# Patient Record
Sex: FEMALE | Race: WHITE | NOT HISPANIC OR LATINO | Employment: FULL TIME | URBAN - METROPOLITAN AREA
[De-identification: names, ages, dates, MRNs, and addresses within clinical notes are randomized per-mention and may not be internally consistent; named-entity substitution may affect disease eponyms.]

---

## 2017-01-19 ENCOUNTER — OFFICE VISIT (OUTPATIENT)
Dept: FAMILY MEDICINE | Facility: CLINIC | Age: 47
End: 2017-01-19
Payer: COMMERCIAL

## 2017-01-19 VITALS
SYSTOLIC BLOOD PRESSURE: 117 MMHG | WEIGHT: 147.7 LBS | HEART RATE: 74 BPM | TEMPERATURE: 96.5 F | BODY MASS INDEX: 22.39 KG/M2 | HEIGHT: 68 IN | OXYGEN SATURATION: 99 % | DIASTOLIC BLOOD PRESSURE: 76 MMHG

## 2017-01-19 DIAGNOSIS — J10.1 INFLUENZA B: Primary | ICD-10-CM

## 2017-01-19 DIAGNOSIS — Z86.79 H/O PAROXYSMAL SUPRAVENTRICULAR TACHYCARDIA: ICD-10-CM

## 2017-01-19 DIAGNOSIS — R05.9 COUGH: ICD-10-CM

## 2017-01-19 DIAGNOSIS — R93.89 ABNORMAL CXR: ICD-10-CM

## 2017-01-19 LAB
DEPRECATED S PYO AG THROAT QL EIA: NORMAL
FLUAV+FLUBV AG SPEC QL: ABNORMAL
FLUAV+FLUBV AG SPEC QL: ABNORMAL
MICRO REPORT STATUS: NORMAL
SPECIMEN SOURCE: ABNORMAL
SPECIMEN SOURCE: NORMAL

## 2017-01-19 PROCEDURE — 87880 STREP A ASSAY W/OPTIC: CPT | Performed by: NURSE PRACTITIONER

## 2017-01-19 PROCEDURE — 87081 CULTURE SCREEN ONLY: CPT | Performed by: NURSE PRACTITIONER

## 2017-01-19 PROCEDURE — 99214 OFFICE O/P EST MOD 30 MIN: CPT | Performed by: NURSE PRACTITIONER

## 2017-01-19 PROCEDURE — 87804 INFLUENZA ASSAY W/OPTIC: CPT | Performed by: NURSE PRACTITIONER

## 2017-01-19 RX ORDER — ALBUTEROL SULFATE 90 UG/1
2 AEROSOL, METERED RESPIRATORY (INHALATION) EVERY 6 HOURS PRN
Qty: 1 INHALER | Refills: 0 | Status: SHIPPED | OUTPATIENT
Start: 2017-01-19 | End: 2018-05-18

## 2017-01-19 NOTE — MR AVS SNAPSHOT
After Visit Summary   1/19/2017    Wanda Orellana    MRN: [id number]           Patient Information     Date Of Birth          1970        Visit Information        Provider Department      1/19/2017 11:45 AM Carol Bahena APRN [facility]        Today's Diagnoses     Influenza B    -  1     Cough         Abnormal CXR         H/O paroxysmal supraventricular tachycardia           Care Instructions      Return in a couple months for a physical  Come fasting 10-12 hours water and pills only  Recheck thyroid at physical  CXR in a [facility] ([facility])    Influenza (Adult)    Influenza is also called the flu. It is a viral illness that affects the air passages of your lungs. It is different from the common cold. The flu can easily be passed from one to person to another. It may be spread through the air by coughing and sneezing. Or it can be spread by touching the sick person and then touching your own eyes, nose, or mouth.  The flu starts 1 to 3 days after you are exposed to the flu virus. It may last for 1 to 2 weeks. You usually don t need to take antibiotics unless you have a complication. This might be an ear or sinus infection or pneumonia.  Symptoms of the flu may be mild or severe. They can include extreme tiredness (wanting to stay in bed all day), chills, fevers, muscle aches, soreness with eye movement, headache, and a dry, hacking cough.  Home care  Follow these guidelines when caring for yourself at home:    Avoid being around cigarette smoke, whether yours or other people s.    Acetaminophen or ibuprofen will help ease your fever, muscle aches, and headache. Don t give aspirin to anyone younger than 18 who has the flu. Aspirin can harm the liver.    Nausea and loss of appetite are common with the flu. Eat light meals. Drink 6 to 8 glasses of liquids every day. Good choices are water, sport drinks, soft drinks without caffeine, juices, tea, and  soup. Extra fluids will also help loosen secretions in your nose and lungs.    Over-the-counter cold medicines will not make the flu go away faster. But the medicines may help with coughing, sore throat, and congestion in your nose and sinuses. Don t use a decongestant if you have high blood pressure.    Stay home until your fever has been gone for at least 24 hours without using medicine to reduce fever.  Follow-up care  Follow up with your health care provider, or as advised, if you are not getting better over the next week.  If you are 65 or older, talk with your provider about getting a pneumococcal vaccine every 5 years. You should also get this vaccine if you have chronic asthma or COPD. All adults should get a flu vaccine every fall. Ask your provider about this.  When to seek medical advice  Call your health care provider right away if any of these occur:    Cough with lots of colored sputum (mucus) or blood in your sputum    Chest pain, shortness of breath, wheezing, or difficulty breathing    Severe headache, or face, neck, or ear pain    New rash with fever    Fever of 101 F (38 C) oral or higher that doesn t get better with fever medicine    Confusion, behavior change, or seizure    Severe weakness or dizziness    You get a fever or cough after getting better for a few days       7409-1057 [employer]. [address] [place] [address], [place] [address]. All rights reserved. This information is not intended as a substitute for professional medical care. Always follow your healthcare professional's instructions.        Treatment for Supraventricular Tachycardia  Supraventricular tachycardia (SVT) is a type of abnormally fast heartbeat. The heart normally beats 60 to 100 beats per minute. With SVT, the heart beats more than 100 times a minute. It may beat as fast as 250 times a minute. This is caused by a problem in the electrical system of the heart. It can lessen the amount of blood pumped through  the heart.  Types of treatment   You may not need treatment for SVT if you have only had one episode. If you do need treatment, there are several kinds. They include:    Valsalva maneuver. This is a way to increase pressure in the abdomen and chest. It can correct your heart rhythm right away. To do it, you bear down with your stomach muscles, as though you are trying to have a bowel movement.    Carotid massage. Your healthcare provider may gently rub the carotid artery in your neck. This can also help correct the heart rhythm right away.    Medicine. There are various kinds you can take. Calcium channel blockers or adenosine can help correct heart rhythm right away. If you have SVT only 1 or 2 times a year, you may take beta-blockers or calcium channel medicine as needed. If your SVT is more frequent, you may need to take medicine every day. Some people may need to take several medicines to prevent episodes of SVT.    Electrocardioversion. This is a shock to the heart to restart a normal rhythm right away. This may be done if you have a severe episode of SVT.    Catheter ablation. This can help permanently stop SVT. Your healthcare provider puts a thin, flexible tube (catheter) into a blood vessel in the groin. He or she then gently pushes it up into your heart. The area of your heart that causes your SVT is then burned. This prevents that area from starting a signal that causes SVT.   Lifestyle changes to help prevent SVT episodes  Your healthcare provider might suggest other ways to help prevent SVT, such as:    Having less alcohol and caffeine    Not smoking    Lowering your stress    Eating foods that are healthy for your heart  When to call your healthcare provider  Call your healthcare provider if you have any of the following:    Sudden shortness of breath (call 911)    Severe palpitations    Severe dizziness    Severe chest pain    Symptoms that are happening more often     5923-1339 [employer]  [employer] [address] [place] [address]. All rights reserved. This information is not intended as a substitute for professional medical care. Always follow your healthcare professional's instructions.              Follow-ups after your visit        Who to contact     If you have questions or need follow up information about today's clinic visit or your schedule please contact [facility] directly at [phone].  Normal or non-critical lab and imaging results will be communicated to you by [email or web address], letter or phone within 4 business days after the clinic has received the results. If you do not hear from us within 7 days, please contact the clinic through [employer] or phone. If you have a critical or abnormal lab result, we will notify you by phone as soon as possible.  Submit refill requests through [employer] or call your pharmacy and they will forward the refill request to us. Please allow 3 business days for your refill to be completed.          Additional Information About Your Visit        [email or web address] Information     [employer] gives you secure access to your electronic health record. If you see a primary care provider, you can also send messages to your care team and make appointments. If you have questions, please call your primary care clinic.  If you do not have a primary care provider, please call [phone] and they will assist you.        Care EveryWhere ID     This is your Care EveryWhere ID. This could be used by other organizations to access your [facility] medical records  [id number]        Your Vitals Were     Pulse Temperature Height BMI (Body Mass Index) Pulse Oximetry       74 96.5  F (35.8  C) (Oral) 5' 7.91\" (1.725 m) 22.51 kg/m2 99%        Blood Pressure from Last 3 Encounters:   01/19/17 117/76   05/23/16 118/80   02/10/16 123/87    Weight from Last 3 Encounters:   01/19/17 147 lb 11.2 oz (66.996 kg)   05/23/16 145 lb 1.6 oz (65.817 kg)   02/10/16 144 lb 4.8 oz (65.454 kg)         "      We Performed the Following     Beta strep group A culture     Influenza A/B antigen     Strep, Rapid Screen     XR Chest 2 Views          Today's Medication Changes          These changes are accurate as of: 1/19/17 12:46 PM.  If you have any questions, ask your nurse or doctor.               Start taking these medicines.        Dose/Directions    albuterol 108 (90 BASE) MCG/ACT Inhaler   Commonly known as:  PROAIR HFA/PROVENTIL HFA/VENTOLIN HFA   Used for:  Influenza B   Started by:  Carol Bahena APRN CNP        Dose:  2 puff   Inhale 2 puffs into the lungs every 6 hours as needed for shortness of breath / dyspnea or wheezing   Quantity:  1 Inhaler   Refills:  0            Where to get your medicines      These medications were sent to [id number] - [place], [place] - [address]  [address] [address]     Phone:  [phone]    - albuterol 108 (90 BASE) MCG/ACT Inhaler             Primary Care Provider Office Phone # Fax #    Desire Garrett [phone]       XXX RESIGNED  [address] [facility] [address]  [facility] [address]        Thank you!     Thank you for choosing [facility]  for your care. Our goal is always to provide you with excellent care. Hearing back from our patients is one way we can continue to improve our services. Please take a few minutes to complete the written survey that you may receive in the mail after your visit with us. Thank you!             Your Updated Medication List - Protect others around you: Learn how to safely use, store and throw away your medicines at www.disposemymeds.org.          This list is accurate as of: 1/19/17 12:46 PM.  Always use your most recent med list.                   Brand Name Dispense Instructions for use    albuterol 108 (90 BASE) MCG/ACT Inhaler    PROAIR HFA/PROVENTIL HFA/VENTOLIN HFA    1 Inhaler    Inhale 2 puffs into the lungs every 6 hours as needed for shortness of breath /  dyspnea or wheezing       multivitamin, therapeutic with minerals Tabs tablet      Take 1 tablet by mouth daily       norethindrone-ethinyl estradiol 1-35 MG-MCG per tablet    ORTHO-NOVUM 1-35 TAB,NORTREL 1-35 TAB    84 tablet    Take 1 tablet by mouth daily       vitamin D 2000 UNITS tablet      Take 1 tablet by mouth daily.       vitamin E 400 UNITS Tabs      Take 400 Units by mouth daily.       ZYRTEC ALLERGY PO      Take 1 tablet by mouth daily.

## 2017-01-19 NOTE — NURSING NOTE
"Chief Complaint   Patient presents with     Sinus Problem       Initial /76 mmHg  Pulse 74  Temp(Src) 96.5  F (35.8  C) (Oral)  Ht 5' 7.91\" (1.725 m)  Wt 147 lb 11.2 oz (66.996 kg)  BMI 22.51 kg/m2  SpO2 99% Estimated body mass index is 22.51 kg/(m^2) as calculated from the following:    Height as of this encounter: 5' 7.91\" (1.725 m).    Weight as of this encounter: 147 lb 11.2 oz (66.996 kg).  BP completed using cuff size: mg Smith MA      "

## 2017-01-19 NOTE — PATIENT INSTRUCTIONS
Return in a couple months for a physical  Come fasting 10-12 hours water and pills only  Recheck thyroid at physical  CXR in a [facility] ([facility])    Influenza (Adult)    Influenza is also called the flu. It is a viral illness that affects the air passages of your lungs. It is different from the common cold. The flu can easily be passed from one to person to another. It may be spread through the air by coughing and sneezing. Or it can be spread by touching the sick person and then touching your own eyes, nose, or mouth.  The flu starts 1 to 3 days after you are exposed to the flu virus. It may last for 1 to 2 weeks. You usually don t need to take antibiotics unless you have a complication. This might be an ear or sinus infection or pneumonia.  Symptoms of the flu may be mild or severe. They can include extreme tiredness (wanting to stay in bed all day), chills, fevers, muscle aches, soreness with eye movement, headache, and a dry, hacking cough.  Home care  Follow these guidelines when caring for yourself at home:    Avoid being around cigarette smoke, whether yours or other people s.    Acetaminophen or ibuprofen will help ease your fever, muscle aches, and headache. Don t give aspirin to anyone younger than 18 who has the flu. Aspirin can harm the liver.    Nausea and loss of appetite are common with the flu. Eat light meals. Drink 6 to 8 glasses of liquids every day. Good choices are water, sport drinks, soft drinks without caffeine, juices, tea, and soup. Extra fluids will also help loosen secretions in your nose and lungs.    Over-the-counter cold medicines will not make the flu go away faster. But the medicines may help with coughing, sore throat, and congestion in your nose and sinuses. Don t use a decongestant if you have high blood pressure.    Stay home until your fever has been gone for at least 24 hours without using medicine to reduce fever.  Follow-up care  Follow up with  your health care provider, or as advised, if you are not getting better over the next week.  If you are 65 or older, talk with your provider about getting a pneumococcal vaccine every 5 years. You should also get this vaccine if you have chronic asthma or COPD. All adults should get a flu vaccine every fall. Ask your provider about this.  When to seek medical advice  Call your health care provider right away if any of these occur:    Cough with lots of colored sputum (mucus) or blood in your sputum    Chest pain, shortness of breath, wheezing, or difficulty breathing    Severe headache, or face, neck, or ear pain    New rash with fever    Fever of 101 F (38 C) oral or higher that doesn t get better with fever medicine    Confusion, behavior change, or seizure    Severe weakness or dizziness    You get a fever or cough after getting better for a few days       3198-2482 [employer]. [address] [place] [address]. All rights reserved. This information is not intended as a substitute for professional medical care. Always follow your healthcare professional's instructions.        Treatment for Supraventricular Tachycardia  Supraventricular tachycardia (SVT) is a type of abnormally fast heartbeat. The heart normally beats 60 to 100 beats per minute. With SVT, the heart beats more than 100 times a minute. It may beat as fast as 250 times a minute. This is caused by a problem in the electrical system of the heart. It can lessen the amount of blood pumped through the heart.  Types of treatment   You may not need treatment for SVT if you have only had one episode. If you do need treatment, there are several kinds. They include:    Valsalva maneuver. This is a way to increase pressure in the abdomen and chest. It can correct your heart rhythm right away. To do it, you bear down with your stomach muscles, as though you are trying to have a bowel movement.    Carotid massage. Your healthcare provider  may gently rub the carotid artery in your neck. This can also help correct the heart rhythm right away.    Medicine. There are various kinds you can take. Calcium channel blockers or adenosine can help correct heart rhythm right away. If you have SVT only 1 or 2 times a year, you may take beta-blockers or calcium channel medicine as needed. If your SVT is more frequent, you may need to take medicine every day. Some people may need to take several medicines to prevent episodes of SVT.    Electrocardioversion. This is a shock to the heart to restart a normal rhythm right away. This may be done if you have a severe episode of SVT.    Catheter ablation. This can help permanently stop SVT. Your healthcare provider puts a thin, flexible tube (catheter) into a blood vessel in the groin. He or she then gently pushes it up into your heart. The area of your heart that causes your SVT is then burned. This prevents that area from starting a signal that causes SVT.   Lifestyle changes to help prevent SVT episodes  Your healthcare provider might suggest other ways to help prevent SVT, such as:    Having less alcohol and caffeine    Not smoking    Lowering your stress    Eating foods that are healthy for your heart  When to call your healthcare provider  Call your healthcare provider if you have any of the following:    Sudden shortness of breath (call 911)    Severe palpitations    Severe dizziness    Severe chest pain    Symptoms that are happening more often     5880-1432 [employer]. [address] [place] [address]. All rights reserved. This information is not intended as a substitute for professional medical care. Always follow your healthcare professional's instructions.

## 2017-01-19 NOTE — PROGRESS NOTES
"  SUBJECTIVE:                                                    Wanda Orellana is a 47 year old female who presents to clinic today for the following health issues:    Acute Illness  Please do ACT and AAP   Acute illness concerns: Sinus infection and cough  Onset: 1 week     Fever: YES     Chills/Sweats: YES    Headache (location?): YES    Sinus Pressure:YES    Conjunctivitis:  no    Ear Pain: YES: both    Rhinorrhea: YES    Congestion: YES    Sore Throat: no      Cough: YES    Wheeze: YES    Decreased Appetite: YES    Nausea: YES    Vomiting: no     Diarrhea:  no     Dysuria/Freq.: no     Fatigue/Achiness: YES    Sick/Strep Exposure: no      Therapies Tried and outcome: Tylenol.   Notes that she has history of PSVT and has been told not to take cold medicines - was diagnosed at the ED in 2014.  Reviewed record and patient did not have follow-up with cardiology.  Did follow-up on elevated TSH noted in ED and was normal on recheck.  Also had \"focal density\" on CXR at ED and has not had recommended repeat CXR.    Unsure about history with asthma or diagnosis.  Has had trouble breathing with exercise in the past, but not currently.  When she gets a cold it lasts much longer than for others.  Records show mild intermittent diagnosis.  She has an old albuterol that she never uses.    Questions/Concerns: 3 year old has the running nose and cold. What should she do to lessen it.    Problem list and histories reviewed & adjusted, as indicated.  Additional history: as documented    Problem list, Medication list, Allergies, and Medical/Social/Surgical histories reviewed in [facility] and updated as appropriate.    ROS:  Constitutional, HEENT, cardiovascular, pulmonary, gi and gu systems are negative, except as otherwise noted.    OBJECTIVE:                                                    /76 mmHg  Pulse 74  Temp(Src) 96.5  F (35.8  C) (Oral)  Ht 5' 7.91\" (1.725 m)  Wt 147 lb 11.2 oz (66.996 kg)  BMI 22.51 kg/m2  " SpO2 99%  Body mass index is 22.51 kg/(m^2).  GENERAL: alert and fatigued  EYES: PERRL, EOMI and conjunctiva/corneas- conjunctival injection OU  HENT: normal cephalic/atraumatic, both ears: clear effusion, nose and mouth without ulcers or lesions, nasal mucosa edematous , rhinorrhea clear, oropharynx clear and oral mucous membranes moist  NECK: bilateral anterior cervical adenopathy, no asymmetry, masses, or scars and thyroid normal to palpation  RESP: expiratory wheezes throughout  CV: regular rate and rhythm, normal S1 S2, no S3 or S4, no murmur, click or rub, no peripheral edema and peripheral pulses strong    Diagnostic Test Results:  Results for orders placed or performed in visit on 01/19/17 (from the past 24 hour(s))   Influenza A/B antigen   Result Value Ref Range    Influenza A/B Agn Specimen Nasal     Influenza A  NEG     Negative   Test results must be correlated with clinical data. If necessary, results   should be confirmed by a molecular assay or viral culture.      Influenza B (A) NEG     Positive   Test results must be correlated with clinical data. If necessary, results   should be confirmed by a molecular assay or viral culture.     Strep, Rapid Screen   Result Value Ref Range    Specimen Description Throat     Rapid Strep A Screen       NEGATIVE: No Group A streptococcal antigen detected by immunoassay, await   culture report.      Micro Report Status FINAL 01/19/2017         ASSESSMENT/PLAN:                                                    Asthma: Intermittent with mild exacerbation   Plan:  Asthma Action Plan given  Medications:  Short acting bronchodilator inhaler: Albuterol          (J10.1) Influenza B  (primary encounter diagnosis)  Comment:   Plan: albuterol (PROAIR HFA/PROVENTIL HFA/VENTOLIN         HFA) 108 (90 BASE) MCG/ACT Inhaler    Concern for potential for respiratory distress due to asthma diagnosis.  Wheezing is mild and VS are stable.  Albuterol for wheezing.  Discussed tamiflu  and opted against due to length since onset of symptoms and relative respiratory stability.  Fluids, humidified air and ibuprofen for self-care.  Follow-up in 1-2 month during physical unless worsening symptoms.  Call if SVT provoked by albuterol.      (R05) Cough  Comment:   Plan: Beta strep group A culture, Strep, Rapid         Screen, Influenza A/B antigen            (R93.8) Abnormal CXR  Comment:   Plan: XR Chest 2 Views        Follow-up CXR to 2014 - defer until one month after flu    (Z86.79) H/O paroxysmal supraventricular tachycardia  Comment:   Plan: noted in PL - discussed that she can see cardiology if desired.  It sounds as if episodes are rare and largely self-limiting.  Follow-up further at physical.  Recheck thyroid.    Patient Instructions       Return in a couple months for a physical  Come fasting 10-12 hours water and pills only  Recheck thyroid at physical  CXR in a [facility] ([facility])    Influenza (Adult)    Influenza is also called the flu. It is a viral illness that affects the air passages of your lungs. It is different from the common cold. The flu can easily be passed from one to person to another. It may be spread through the air by coughing and sneezing. Or it can be spread by touching the sick person and then touching your own eyes, nose, or mouth.  The flu starts 1 to 3 days after you are exposed to the flu virus. It may last for 1 to 2 weeks. You usually don t need to take antibiotics unless you have a complication. This might be an ear or sinus infection or pneumonia.  Symptoms of the flu may be mild or severe. They can include extreme tiredness (wanting to stay in bed all day), chills, fevers, muscle aches, soreness with eye movement, headache, and a dry, hacking cough.  Home care  Follow these guidelines when caring for yourself at home:    Avoid being around cigarette smoke, whether yours or other people s.    Acetaminophen or ibuprofen will help ease  your fever, muscle aches, and headache. Don t give aspirin to anyone younger than 18 who has the flu. Aspirin can harm the liver.    Nausea and loss of appetite are common with the flu. Eat light meals. Drink 6 to 8 glasses of liquids every day. Good choices are water, sport drinks, soft drinks without caffeine, juices, tea, and soup. Extra fluids will also help loosen secretions in your nose and lungs.    Over-the-counter cold medicines will not make the flu go away faster. But the medicines may help with coughing, sore throat, and congestion in your nose and sinuses. Don t use a decongestant if you have high blood pressure.    Stay home until your fever has been gone for at least 24 hours without using medicine to reduce fever.  Follow-up care  Follow up with your health care provider, or as advised, if you are not getting better over the next week.  If you are 65 or older, talk with your provider about getting a pneumococcal vaccine every 5 years. You should also get this vaccine if you have chronic asthma or COPD. All adults should get a flu vaccine every fall. Ask your provider about this.  When to seek medical advice  Call your health care provider right away if any of these occur:    Cough with lots of colored sputum (mucus) or blood in your sputum    Chest pain, shortness of breath, wheezing, or difficulty breathing    Severe headache, or face, neck, or ear pain    New rash with fever    Fever of 101 F (38 C) oral or higher that doesn t get better with fever medicine    Confusion, behavior change, or seizure    Severe weakness or dizziness    You get a fever or cough after getting better for a few days       5903-0466 [employer]. [address] [place] [address], [place] [address]. All rights reserved. This information is not intended as a substitute for professional medical care. Always follow your healthcare professional's instructions.        Treatment for Supraventricular  Tachycardia  Supraventricular tachycardia (SVT) is a type of abnormally fast heartbeat. The heart normally beats 60 to 100 beats per minute. With SVT, the heart beats more than 100 times a minute. It may beat as fast as 250 times a minute. This is caused by a problem in the electrical system of the heart. It can lessen the amount of blood pumped through the heart.  Types of treatment   You may not need treatment for SVT if you have only had one episode. If you do need treatment, there are several kinds. They include:    Valsalva maneuver. This is a way to increase pressure in the abdomen and chest. It can correct your heart rhythm right away. To do it, you bear down with your stomach muscles, as though you are trying to have a bowel movement.    Carotid massage. Your healthcare provider may gently rub the carotid artery in your neck. This can also help correct the heart rhythm right away.    Medicine. There are various kinds you can take. Calcium channel blockers or adenosine can help correct heart rhythm right away. If you have SVT only 1 or 2 times a year, you may take beta-blockers or calcium channel medicine as needed. If your SVT is more frequent, you may need to take medicine every day. Some people may need to take several medicines to prevent episodes of SVT.    Electrocardioversion. This is a shock to the heart to restart a normal rhythm right away. This may be done if you have a severe episode of SVT.    Catheter ablation. This can help permanently stop SVT. Your healthcare provider puts a thin, flexible tube (catheter) into a blood vessel in the groin. He or she then gently pushes it up into your heart. The area of your heart that causes your SVT is then burned. This prevents that area from starting a signal that causes SVT.   Lifestyle changes to help prevent SVT episodes  Your healthcare provider might suggest other ways to help prevent SVT, such as:    Having less alcohol and caffeine    Not  smoking    Lowering your stress    Eating foods that are healthy for your heart  When to call your healthcare provider  Call your healthcare provider if you have any of the following:    Sudden shortness of breath (call 911)    Severe palpitations    Severe dizziness    Severe chest pain    Symptoms that are happening more often     8423-6003 [employer]. [address] [place] [address]. All rights reserved. This information is not intended as a substitute for professional medical care. Always follow your healthcare professional's instructions.              MARY JO Willams [facility]

## 2017-01-20 ASSESSMENT — ASTHMA QUESTIONNAIRES: ACT_TOTALSCORE: 20

## 2017-01-21 LAB
BACTERIA SPEC CULT: NORMAL
MICRO REPORT STATUS: NORMAL
SPECIMEN SOURCE: NORMAL

## 2017-03-13 NOTE — TELEPHONE ENCOUNTER
Pending Prescriptions:                       Disp   Refills    norethindrone-ethinyl estradiol (ORTHO-NO*84 tab*0            Sig: Take 1 tablet by mouth daily      Last OV was 3/2/16. Due for AE. TC to patient. LMTC.   Adeola Corral, RN-BSN

## 2017-03-22 NOTE — PROGRESS NOTES
SUBJECTIVE:     CC: Wanda Orellana is an 47 year old woman who presents for preventive health visit.     Healthy Habits:    Do you get at least three servings of calcium containing foods daily (dairy, green leafy vegetables, etc.)? yes    Amount of exercise or daily activities, outside of work: 3 day(s) per week    Problems taking medications regularly No    Medication side effects: No    Have you had an eye exam in the past two years? yes    Do you see a dentist twice per year? yes    Do you have sleep apnea, excessive snoring or daytime drowsiness?yes    Questions/Concerns: Possible yeast infection. Had the flu over the past weekend and feeling very tired. Something going on with both eyes with different issues.     Influenza in January and flu-like illness over this past weekend    Vaginal Symptoms      Duration: 1 month    Description  vaginal discharge - white clear, itching and odor    Intensity:  mild    Accompanying signs and symptoms (fever/dysuria/abdominal or back pain): Fever this weekend but might not be related to it.    History  Sexually active: yes, single partner, contraception - none  Possibility of pregnancy: No  Recent antibiotic use: no     Precipitating or alleviating factors: None    Therapies tried and outcome: none   Outcome: None     Right Eye Problem      Duration: 2-3 days    Description:  Location: right  Pain: YES- sore and stingy  Redness: YES  Discharge: YES- liquid and clear    Accompanying signs and symptoms: Really tired and is strain    History (Trauma, foreign body exposure,): None but has been very watery and red; had laser eye surgery    Precipitating or alleviating factors (contact use): None    Therapies tried and outcome: Went to the eye doctor in January - new prescription, but these do not fix the problem and can even be worse vision.  Did have pressure check.     Left Eye Problem stigmatism       Duration: last couple months vision changing and both eyes very watery.   Wateriness has improved.    Description:  Location: left  Pain: YES - sore and stingy  Redness: YES  Discharge: YES- liquid and clear    Accompanying signs and symptoms: really tired    History (Trauma, foreign body exposure,): none, but has stigmatism in left eye    Precipitating or alleviating factors (contact use): None    Therapies tried and outcome: Went to the eye doctor.       Today's PHQ-2 Score:   PHQ-2 ( 1999 [employer]) 3/24/2017 1/19/2017   Q1: Little interest or pleasure in doing things 0 0   Q2: Feeling down, depressed or hopeless 0 0   PHQ-2 Score 0 0       Abuse: Current or Past(Physical, Sexual or Emotional)- No  Do you feel safe in your environment - Yes    Social History   Substance Use Topics     Smoking status: Never Smoker     Smokeless tobacco: Never Used     Alcohol use Yes      Comment: OCCASSIONALLY- 2 PER MONTH     The patient does not drink >3 drinks per day nor >7 drinks per week.    Recent Labs   Lab Test  02/10/16   0934   CHOL  211*   HDL  54   LDL  124*   TRIG  164*   NHDL  157*       Reviewed orders with patient.  Reviewed health maintenance and updated orders accordingly - Yes    Mammo Decision Support:  Patient under age 50, mutual decision reflected in health maintenance.      Pertinent mammograms are reviewed under the imaging tab.  History of abnormal Pap smear: NO - age 30- 65 PAP every 3 years recommended    Reviewed and updated as needed this visit by clinical staff  Tobacco  Allergies  Meds  Med Hx  Surg Hx  Fam Hx  Soc Hx        Reviewed and updated as needed this visit by Provider        Past Medical History:   Diagnosis Date     Fracture of metatarsal bone(s), closed 11/09    foreign body in foot ?needle?     Headaches      Mild intermittent asthma 2011    exercise induced     Seasonal allergies       Past Surgical History:   Procedure Laterality Date     DILATION AND CURETTAGE SUCTION  2/24/2012    Procedure:DILATION AND CURETTAGE SUCTION; Suction Dilation & Curettage    (11 weeks); Surgeon:CHESTER ALVAREZ; Location:[facility] OR     EYE SURGERY  2002    laser surg w/ complications of infections, etc     WISDOM TEETH[         ROS:  C: NEGATIVE for fever, chills, change in weight; fatigue  I: NEGATIVE for worrisome rashes, moles or lesions  E: NEGATIVE for vision changes or irritation  ENT: NEGATIVE for ear, mouth and throat problems other than noted above  R: NEGATIVE for significant cough or SOB  B: NEGATIVE for masses, tenderness or discharge  CV: NEGATIVE for chest pain, palpitations or peripheral edema  GI: NEGATIVE for nausea, abdominal pain, heartburn, or change in bowel habits  : NEGATIVE for unusual urinary or vaginal symptoms other than noted above. Periods are regular.  M: NEGATIVE for significant arthralgias or myalgia  N: NEGATIVE for weakness, dizziness or paresthesias  E: NEGATIVE for temperature intolerance, skin/hair changes  H: NEGATIVE for bleeding problems  P: NEGATIVE for changes in mood or affect    Problem list, Medication list, Allergies, and Medical/Social/Surgical histories reviewed in EPIC and updated as appropriate.  OBJECTIVE:     /73 (BP Location: Left arm, Patient Position: Chair, Cuff Size: Adult Regular)  Pulse 74  Temp 98.1  F (36.7  C) (Oral)  Wt 147 lb 4.8 oz (66.8 kg)  LMP 03/17/2017 (Approximate)  SpO2 99%  BMI 22.45 kg/m2  EXAM:  GENERAL: healthy, alert and no distress  EYES: Eyes grossly normal to inspection, PERRL and conjunctivae injected  HENT: ear canals and TM's normal, nose and mouth without ulcers or lesions  NECK: no adenopathy, no asymmetry, masses, or scars and thyroid normal to palpation  RESP: lungs clear to auscultation - no rales, rhonchi or wheezes  BREAST: normal without masses, tenderness or nipple discharge and no palpable axillary masses or adenopathy  CV: regular rate and rhythm, normal S1 S2, no S3 or S4, no murmur, click or rub, no peripheral edema and peripheral pulses strong  ABDOMEN: soft, nontender, no  "hepatosplenomegaly, no masses and bowel sounds normal  MS: no gross musculoskeletal defects noted, no edema  SKIN: no suspicious lesions or rashes  NEURO: Normal strength and tone, mentation intact and speech normal  PSYCH: mentation appears normal, affect normal/bright  LYMPH: no cervical, supraclavicular, axillary, or adenopathy    ASSESSMENT/PLAN:     (Z00.01) Encounter for routine adult medical exam with abnormal findings  (primary encounter diagnosis)  Comment:   Plan:     (J45.20) Mild intermittent asthma without complication  Comment:   Plan: Asthma Action Plan (AAP)            (N89.8) Vaginal discharge  Comment:   Plan: Wet prep            (H57.8) Eye discharge  Comment:   Plan: olopatadine (PATANOL) 0.1 % ophthalmic solution            (Z13.220) Lipid screening  Comment:   Plan: Lipid Profile (Chol, Trig, HDL, LDL calc)            (Z13.1) Screening for diabetes mellitus  Comment:   Plan: Glucose            (R53.83) Fatigue, unspecified type  Comment:   Plan: CBC with platelets differential, TSH with free         T4 reflex     Likely simply parenting young child.  Has no other symptoms of concern.  Will rule out anemia and thyroid    (Z30.41) Encounter for surveillance of contraceptive pills  Comment:   Plan: norethindrone-ethinyl estradiol (ORTHO-NOVUM         1-35 TAB,NORTREL 1-35 TAB) 1-35 MG-MCG per         tablet            (B37.3) Yeast infection of the vagina  Comment:   Plan: fluconazole (DIFLUCAN) 150 MG tablet        Discussed dual infection and treatment options as well as vaginal health and hygeine    (N76.0,  B96.89) BV (bacterial vaginosis)  Comment:   Plan: metroNIDAZOLE (METROGEL) 0.75 % vaginal gel              COUNSELING:   Reviewed preventive health counseling, as reflected in patient instructions       reports that she has never smoked. She has never used smokeless tobacco.    Estimated body mass index is 22.45 kg/(m^2) as calculated from the following:    Height as of 1/19/17: 5' 7.91\" " (1.725 m).    Weight as of this encounter: 147 lb 4.8 oz (66.8 kg).     Wt Readings from Last 5 Encounters:   03/24/17 147 lb 4.8 oz (66.8 kg)   01/19/17 147 lb 11.2 oz (67 kg)   05/23/16 145 lb 1.6 oz (65.8 kg)   02/10/16 144 lb 4.8 oz (65.5 kg)   11/14/14 138 lb 4.8 oz (62.7 kg)       Counseling Resources:  ATP IV Guidelines  Pooled Cohorts Equation Calculator  Breast Cancer Risk Calculator  FRAX Risk Assessment  ICSI Preventive Guidelines  Dietary Guidelines for Americans, 2010  USDA's MyPlate  ASA Prophylaxis  Lung CA Screening    MARY JO Willams CNP  [facility] with kwame

## 2017-03-24 ENCOUNTER — HOSPITAL ENCOUNTER (OUTPATIENT)
Dept: GENERAL RADIOLOGY | Facility: CLINIC | Age: 47
Discharge: HOME | End: 2017-03-24
Attending: NURSE PRACTITIONER | Admitting: NURSE PRACTITIONER
Payer: COMMERCIAL

## 2017-03-24 ENCOUNTER — OFFICE VISIT (OUTPATIENT)
Dept: FAMILY MEDICINE | Facility: CLINIC | Age: 47
End: 2017-03-24
Payer: COMMERCIAL

## 2017-03-24 ENCOUNTER — MYC MEDICAL ADVICE (OUTPATIENT)
Dept: FAMILY MEDICINE | Facility: CLINIC | Age: 47
End: 2017-03-24

## 2017-03-24 VITALS
BODY MASS INDEX: 22.45 KG/M2 | OXYGEN SATURATION: 99 % | WEIGHT: 147.3 LBS | TEMPERATURE: 98.1 F | SYSTOLIC BLOOD PRESSURE: 112 MMHG | DIASTOLIC BLOOD PRESSURE: 73 MMHG | HEART RATE: 74 BPM

## 2017-03-24 DIAGNOSIS — H57.89 EYE DISCHARGE: ICD-10-CM

## 2017-03-24 DIAGNOSIS — B37.31 YEAST INFECTION OF THE VAGINA: ICD-10-CM

## 2017-03-24 DIAGNOSIS — B96.89 BV (BACTERIAL VAGINOSIS): ICD-10-CM

## 2017-03-24 DIAGNOSIS — R53.83 FATIGUE, UNSPECIFIED TYPE: ICD-10-CM

## 2017-03-24 DIAGNOSIS — N76.0 BV (BACTERIAL VAGINOSIS): ICD-10-CM

## 2017-03-24 DIAGNOSIS — Z00.01 ENCOUNTER FOR ROUTINE ADULT MEDICAL EXAM WITH ABNORMAL FINDINGS: Primary | ICD-10-CM

## 2017-03-24 DIAGNOSIS — Z13.220 LIPID SCREENING: ICD-10-CM

## 2017-03-24 DIAGNOSIS — Z30.41 ENCOUNTER FOR SURVEILLANCE OF CONTRACEPTIVE PILLS: ICD-10-CM

## 2017-03-24 DIAGNOSIS — J45.20 MILD INTERMITTENT ASTHMA WITHOUT COMPLICATION: ICD-10-CM

## 2017-03-24 DIAGNOSIS — N89.8 VAGINAL DISCHARGE: ICD-10-CM

## 2017-03-24 DIAGNOSIS — Z13.1 SCREENING FOR DIABETES MELLITUS: ICD-10-CM

## 2017-03-24 LAB
BASOPHILS # BLD AUTO: 0 10E9/L (ref 0–0.2)
BASOPHILS NFR BLD AUTO: 0.3 %
CHOLEST SERPL-MCNC: 140 MG/DL
DIFFERENTIAL METHOD BLD: NORMAL
EOSINOPHIL # BLD AUTO: 0.1 10E9/L (ref 0–0.7)
EOSINOPHIL NFR BLD AUTO: 1.5 %
ERYTHROCYTE [DISTWIDTH] IN BLOOD BY AUTOMATED COUNT: 13.2 % (ref 10–15)
GLUCOSE SERPL-MCNC: 79 MG/DL (ref 70–99)
HCT VFR BLD AUTO: 38.5 % (ref 35–47)
HDLC SERPL-MCNC: 50 MG/DL
HGB BLD-MCNC: 12.6 G/DL (ref 11.7–15.7)
LDLC SERPL CALC-MCNC: 68 MG/DL
LYMPHOCYTES # BLD AUTO: 2.9 10E9/L (ref 0.8–5.3)
LYMPHOCYTES NFR BLD AUTO: 40.4 %
MCH RBC QN AUTO: 29.6 PG (ref 26.5–33)
MCHC RBC AUTO-ENTMCNC: 32.7 G/DL (ref 31.5–36.5)
MCV RBC AUTO: 90 FL (ref 78–100)
MICRO REPORT STATUS: ABNORMAL
MONOCYTES # BLD AUTO: 0.5 10E9/L (ref 0–1.3)
MONOCYTES NFR BLD AUTO: 7.3 %
NEUTROPHILS # BLD AUTO: 3.6 10E9/L (ref 1.6–8.3)
NEUTROPHILS NFR BLD AUTO: 50.5 %
NONHDLC SERPL-MCNC: 90 MG/DL
PLATELET # BLD AUTO: 310 10E9/L (ref 150–450)
RBC # BLD AUTO: 4.26 10E12/L (ref 3.8–5.2)
SPECIMEN SOURCE: ABNORMAL
TRIGL SERPL-MCNC: 112 MG/DL
TSH SERPL DL<=0.005 MIU/L-ACNC: 1.46 MU/L (ref 0.4–4)
WBC # BLD AUTO: 7.1 10E9/L (ref 4–11)
WET PREP SPEC: ABNORMAL

## 2017-03-24 PROCEDURE — 36415 COLL VENOUS BLD VENIPUNCTURE: CPT | Performed by: NURSE PRACTITIONER

## 2017-03-24 PROCEDURE — 99213 OFFICE O/P EST LOW 20 MIN: CPT | Mod: 25 | Performed by: NURSE PRACTITIONER

## 2017-03-24 PROCEDURE — 71020 XR CHEST 2 VW: CPT

## 2017-03-24 PROCEDURE — 84443 ASSAY THYROID STIM HORMONE: CPT | Performed by: NURSE PRACTITIONER

## 2017-03-24 PROCEDURE — 85025 COMPLETE CBC W/AUTO DIFF WBC: CPT | Performed by: NURSE PRACTITIONER

## 2017-03-24 PROCEDURE — 99396 PREV VISIT EST AGE 40-64: CPT | Performed by: NURSE PRACTITIONER

## 2017-03-24 PROCEDURE — 87210 SMEAR WET MOUNT SALINE/INK: CPT | Performed by: NURSE PRACTITIONER

## 2017-03-24 PROCEDURE — 82947 ASSAY GLUCOSE BLOOD QUANT: CPT | Performed by: NURSE PRACTITIONER

## 2017-03-24 PROCEDURE — 80061 LIPID PANEL: CPT | Performed by: NURSE PRACTITIONER

## 2017-03-24 RX ORDER — OLOPATADINE HYDROCHLORIDE 1 MG/ML
1 SOLUTION/ DROPS OPHTHALMIC 2 TIMES DAILY
Qty: 5 ML | Refills: 3 | Status: SHIPPED | OUTPATIENT
Start: 2017-03-24 | End: 2018-05-18

## 2017-03-24 RX ORDER — FLUCONAZOLE 150 MG/1
150 TABLET ORAL ONCE
Qty: 1 TABLET | Refills: 0 | Status: SHIPPED | OUTPATIENT
Start: 2017-03-24 | End: 2017-03-24

## 2017-03-24 RX ORDER — METRONIDAZOLE 7.5 MG/G
1 GEL VAGINAL AT BEDTIME
Qty: 70 G | Refills: 0 | Status: SHIPPED | OUTPATIENT
Start: 2017-03-24 | End: 2017-03-29

## 2017-03-24 NOTE — MR AVS SNAPSHOT
After Visit Summary   3/24/2017    Wanda Orellana    MRN: [id number]           Patient Information     Date Of Birth          1970        Visit Information        Provider Department      3/24/2017 9:00 AM Carol Bahena APRN [facility]        Today's Diagnoses     Routine general medical examination at a health care facility    -  1    Encounter for routine adult medical exam with abnormal findings        Mild intermittent asthma without complication        Vaginal discharge        Eye discharge        Lipid screening        Screening for diabetes mellitus        Fatigue, unspecified type        Encounter for surveillance of contraceptive pills          Care Instructions    [facility] - Early Childhood Screening - [email or web address]    Jorgito Zayas  [facility] [employer]  [employer]    Try allergy eye drops - patanol  Return to eye doctor, but make sure to see ophthalmology     Preventive Health Recommendations  Female Ages 40 to 49    Yearly exam:     See your health care provider every year in order to  1. Review health changes.   2. Discuss preventive care.    3. Review your medicines if your doctor prescribed any.      Get a Pap test every three years (unless you have an abnormal result and your provider advises testing more often).      If you get Pap tests with HPV test, you only need to test every 5 years, unless you have an abnormal result. You do not need a Pap test if your uterus was removed (hysterectomy) and you have not had cancer.      You should be tested each year for STDs (sexually transmitted diseases), if you're at risk.       Ask your doctor if you should have a mammogram.      Have a colonoscopy (test for colon cancer) if someone in your family has had colon cancer or polyps before age 50.       Have a cholesterol test every 5 years.       Have a diabetes test (fasting glucose) after age  45. If you are at risk for diabetes, you should have this test every 3 years.    Shots: Get a flu shot each year. Get a tetanus shot every 10 years.     Nutrition:     Eat at least 5 servings of fruits and vegetables each day.    Eat whole-grain bread, whole-wheat pasta and brown rice instead of white grains and rice.    Talk to your provider about Calcium and Vitamin D.     Lifestyle    Exercise at least 150 minutes a week (an average of 30 minutes a day, 5 days a week). This will help you control your weight and prevent disease.    Limit alcohol to one drink per day.    No smoking.     Wear sunscreen to prevent skin cancer.    See your dentist every six months for an exam and cleaning.        Follow-ups after your visit        Who to contact     If you have questions or need follow up information about today's clinic visit or your schedule please contact [facility] [place] at [phone].  Normal or non-critical lab and imaging results will be communicated to you by [employer], letter or phone within 4 business days after the clinic has received the results. If you do not hear from us within 7 days, please contact the clinic through [employer] or phone. If you have a critical or abnormal lab result, we will notify you by phone as soon as possible.  Submit refill requests through [employer] or call your pharmacy and they will forward the refill request to us. Please allow 3 business days for your refill to be completed.          Additional Information About Your Visit        [facility] Information     [employer] gives you secure access to your electronic health record. If you see a primary care provider, you can also send messages to your care team and make appointments. If you have questions, please call your primary care clinic.  If you do not have a primary care provider, please call [phone] and they will assist you.        Care EveryWhere ID     This is your Care EveryWhere ID. This could be used by  other organizations to access your [place] medical records  [id number]        Your Vitals Were     Pulse Temperature Last Period Pulse Oximetry BMI (Body Mass Index)       74 98.1  F (36.7  C) (Oral) 03/17/2017 (Approximate) 99% 22.45 kg/m2        Blood Pressure from Last 3 Encounters:   03/24/17 112/73   01/19/17 117/76   05/23/16 118/80    Weight from Last 3 Encounters:   03/24/17 147 lb 4.8 oz (66.8 kg)   01/19/17 147 lb 11.2 oz (67 kg)   05/23/16 145 lb 1.6 oz (65.8 kg)              We Performed the Following     Asthma Action Plan (AAP)     CBC with platelets differential     Glucose     JUST IN CASE     Lipid Profile (Chol, Trig, HDL, LDL calc)     TSH with free T4 reflex     Wet prep          Today's Medication Changes          These changes are accurate as of: 3/24/17  9:54 AM.  If you have any questions, ask your nurse or doctor.               Start taking these medicines.        Dose/Directions    olopatadine 0.1 % ophthalmic solution   Commonly known as:  PATANOL   Used for:  Eye discharge   Started by:  Carol Bahena APRN CNP        Dose:  1 drop   Place 1 drop into both eyes 2 times daily   Quantity:  5 mL   Refills:  3            Where to get your medicines      These medications were sent to [facility]/pharmacy #[id number] [address] [address]     Phone:  [phone]     norethindrone-ethinyl estradiol 1-35 MG-MCG per tablet    olopatadine 0.1 % ophthalmic solution                Primary Care Provider Office Phone # Fax #    Desire Garrett [phone]       XXX RESIGNED  [address]  [facility] [address]        Thank you!     Thank you for choosing [facility]  for your care. Our goal is always to provide you with excellent care. Hearing back from our patients is one way we can continue to improve our services. Please take a few minutes to complete the written survey that you may receive in the  mail after your visit with us. Thank you!             Your Updated Medication List - Protect others around you: Learn how to safely use, store and throw away your medicines at www.disposemymeds.org.          This list is accurate as of: 3/24/17  9:54 AM.  Always use your most recent med list.                   Brand Name Dispense Instructions for use    albuterol 108 (90 BASE) MCG/ACT Inhaler    PROAIR HFA/PROVENTIL HFA/VENTOLIN HFA    1 Inhaler    Inhale 2 puffs into the lungs every 6 hours as needed for shortness of breath / dyspnea or wheezing       multivitamin, therapeutic with minerals Tabs tablet      Take 1 tablet by mouth daily       norethindrone-ethinyl estradiol 1-35 MG-MCG per tablet    ORTHO-NOVUM 1-35 TAB,NORTREL 1-35 TAB    84 tablet    Take 1 tablet by mouth daily       olopatadine 0.1 % ophthalmic solution    PATANOL    5 mL    Place 1 drop into both eyes 2 times daily       vitamin D 2000 UNITS tablet      Take 1 tablet by mouth daily.       vitamin E 400 UNITS Tabs      Take 400 Units by mouth daily.       ZYRTEC ALLERGY PO      Take 1 tablet by mouth daily.

## 2017-03-24 NOTE — NURSING NOTE
"Chief Complaint   Patient presents with     Physical       Initial /73 (BP Location: Left arm, Patient Position: Chair, Cuff Size: Adult Regular)  Pulse 74  Temp 98.1  F (36.7  C) (Oral)  Wt 147 lb 4.8 oz (66.8 kg)  LMP 03/17/2017 (Approximate)  SpO2 99%  BMI 22.45 kg/m2 Estimated body mass index is 22.45 kg/(m^2) as calculated from the following:    Height as of 1/19/17: 5' 7.91\" (1.725 m).    Weight as of this encounter: 147 lb 4.8 oz (66.8 kg).  Medication Reconciliation: complete     Suman Smith MA      "

## 2017-04-17 NOTE — TELEPHONE ENCOUNTER
Lizet-    A biometric screening form has been placed on your desk. Please review and sign, if okay.     (Patient requests completed form be sent back to her directly).       Rain Kauffman RN  [facility]

## 2017-04-20 NOTE — TELEPHONE ENCOUNTER
I do not see the form in my files at the desk.  Could they have been completed already or by the MA?  KATHERIN Santos

## 2017-10-26 ENCOUNTER — ALLIED HEALTH/NURSE VISIT (OUTPATIENT)
Dept: NURSING | Facility: CLINIC | Age: 47
End: 2017-10-26
Payer: COMMERCIAL

## 2017-10-26 DIAGNOSIS — Z23 NEED FOR PROPHYLACTIC VACCINATION AND INOCULATION AGAINST INFLUENZA: Primary | ICD-10-CM

## 2017-10-26 PROCEDURE — 90471 IMMUNIZATION ADMIN: CPT

## 2017-10-26 PROCEDURE — 90686 IIV4 VACC NO PRSV 0.5 ML IM: CPT

## 2017-10-26 PROCEDURE — 99207 ZZC NO CHARGE NURSE ONLY: CPT

## 2017-10-26 NOTE — PROGRESS NOTES

## 2018-05-01 DIAGNOSIS — Z30.41 ENCOUNTER FOR SURVEILLANCE OF CONTRACEPTIVE PILLS: ICD-10-CM

## 2018-05-01 NOTE — TELEPHONE ENCOUNTER
"Requested Prescriptions   Pending Prescriptions Disp Refills     norethindrone-ethinyl estradiol (ORTHO-NOVUM 1-35 TAB,NORTREL 1-35 TAB) 1-35 MG-MCG per tablet 84 tablet 4    Last Written Prescription Date:  3/24/17  Last Fill Quantity: 84,  # refills: 4   Last office visit: 3/24/2017 with prescribing provider:  3/24/17   Future Office Visit:     Sig: Take 1 tablet by mouth daily    Contraceptives Protocol Failed    5/1/2018 12:20 PM       Failed - Recent (12 mo) or future (30 days) visit within the authorizing provider's specialty    Patient had office visit in the last 12 months or has a visit in the next 30 days with authorizing provider or within the authorizing provider's specialty.  See \"Patient Info\" tab in inbasket, or \"Choose Columns\" in Meds & Orders section of the refill encounter.           Passed - Patient is not a current smoker if age is 35 or older       Passed - No active pregnancy on record       Passed - No positive pregnancy test in past 12 months          "

## 2018-05-01 NOTE — TELEPHONE ENCOUNTER
Medication is being filled for 1 time refill only due to:  Patient needs to be seen because due for annual.     Patient notified, stated that she will call to schedule.    Ese Matthews RN  [facility]

## 2018-05-17 NOTE — PATIENT INSTRUCTIONS
For allergies - antihistamine (like zyrtec), add fluticasone (generic flonase), ketotifen or patanol antihistamine eye drops  We can add singulair if needed    Preventive Health Recommendations  Female Ages 40 to 49    Yearly exam:     See your health care provider every year in order to  1. Review health changes.   2. Discuss preventive care.    3. Review your medicines if your doctor prescribed any.      Get a Pap test every three years (unless you have an abnormal result and your provider advises testing more often).      If you get Pap tests with HPV test, you only need to test every 5 years, unless you have an abnormal result. You do not need a Pap test if your uterus was removed (hysterectomy) and you have not had cancer.      You should be tested each year for STDs (sexually transmitted diseases), if you're at risk.       Ask your doctor if you should have a mammogram.      Have a colonoscopy (test for colon cancer) if someone in your family has had colon cancer or polyps before age 50.       Have a cholesterol test every 5 years.       Have a diabetes test (fasting glucose) after age 45. If you are at risk for diabetes, you should have this test every 3 years.    Shots: Get a flu shot each year. Get a tetanus shot every 10 years.     Nutrition:     Eat at least 5 servings of fruits and vegetables each day.    Eat whole-grain bread, whole-wheat pasta and brown rice instead of white grains and rice.    Talk to your provider about Calcium and Vitamin D.     Lifestyle    Exercise at least 150 minutes a week (an average of 30 minutes a day, 5 days a week). This will help you control your weight and prevent disease.    Limit alcohol to one drink per day.    No smoking.     Wear sunscreen to prevent skin cancer.    See your dentist every six months for an exam and cleaning.

## 2018-05-17 NOTE — PROGRESS NOTES
SUBJECTIVE:   CC: Wanda Orellana is an 48 year old woman who presents for preventive health visit.     Healthy Habits:    Do you get at least three servings of calcium containing foods daily (dairy, green leafy vegetables, etc.)? yes    Amount of exercise or daily activities, outside of work: 4-5 day(s) per week    Problems taking medications regularly No    Medication side effects: No    Have you had an eye exam in the past two years? yes    Do you see a dentist twice per year? yes    Do you have sleep apnea, excessive snoring or daytime drowsiness?no    Mother has ESRD as of fall 2017 and considering transplant.  Mother is getting onto active list stage.  Patient is mother's only child and may be considered as donor and she is not sure how she is feeling about that option.  Would be open to therapy or guidance about making this decision.    Delbert will stay in  for the next year    [facility] last week  Face was numb - felt it was allergies, which have been bad, but cousin worried about stroke    Work insurance wants cholesterol, glucose, TSH and CBC      Today's PHQ-2 Score:   PHQ-2 ( 1999 Pfizer) 5/18/2018 3/24/2017   Q1: Little interest or pleasure in doing things 0 0   Q2: Feeling down, depressed or hopeless 0 0   PHQ-2 Score 0 0       Abuse: Current or Past(Physical, Sexual or Emotional)- No  Do you feel safe in your environment - Yes    Social History   Substance Use Topics     Smoking status: Never Smoker     Smokeless tobacco: Never Used     Alcohol use Yes      Comment: OCCASSIONALLY- 2 PER MONTH     If you drink alcohol do you typically have >3 drinks per day or >7 drinks per week? No                     Reviewed orders with patient.  Reviewed health maintenance and updated orders accordingly - Yes    Patient under age 50, mutual decision reflected in health maintenance.      Pertinent mammograms are reviewed under the imaging tab.  History of abnormal Pap smear: YES - age 30- 65 PAP every  "3 years recommended  Abnormal paps when younger - paps 2011, 2012, 2016 all NIL and 2016 neg HPV    Reviewed and updated as needed this visit by clinical staff  Tobacco  Allergies  Meds  Med Hx  Surg Hx  Fam Hx  Soc Hx        Reviewed and updated as needed this visit by Provider          ROS:  CONSTITUTIONAL: NEGATIVE for fever, chills, change in weight  INTEGUMENTARU/SKIN: NEGATIVE for worrisome rashes, moles or lesions  EYES: NEGATIVE for vision changes or irritation  ENT: NEGATIVE for ear, mouth and throat problems  RESP: NEGATIVE for significant cough or SOB  BREAST: NEGATIVE for masses, tenderness or discharge  CV: NEGATIVE for chest pain, palpitations or peripheral edema  GI: NEGATIVE for nausea, abdominal pain, heartburn, or change in bowel habits  : NEGATIVE for unusual urinary or vaginal symptoms. Periods are regular.  MUSCULOSKELETAL: NEGATIVE for significant arthralgias or myalgia  NEURO: NEGATIVE for weakness, dizziness or paresthesias  ENDOCRINE: NEGATIVE for temperature intolerance, skin/hair changes  HEME/ALLERGY/IMMUNE: NEGATIVE for bleeding problems  PSYCHIATRIC: NEGATIVE for changes in mood or affect    OBJECTIVE:   /78  Pulse 69  Temp 97.4  F (36.3  C) (Oral)  Ht 5' 6.73\" (1.695 m)  Wt 147 lb 11.2 oz (67 kg)  SpO2 97%  BMI 23.32 kg/m2  EXAM:  GENERAL: healthy, alert and no distress  EYES: Eyes grossly normal to inspection, PERRL and conjunctivae and sclerae normal  HENT: ear canals and TM's normal, nose and mouth without ulcers or lesions  NECK: no adenopathy, no asymmetry, masses, or scars and thyroid normal to palpation  RESP: lungs clear to auscultation - no rales, rhonchi or wheezes  BREAST: normal without masses, tenderness or nipple discharge and no palpable axillary masses or adenopathy  CV: regular rate and rhythm, normal S1 S2, no S3 or S4, no murmur, click or rub, no peripheral edema and peripheral pulses strong  ABDOMEN: soft, nontender, no hepatosplenomegaly, no " "masses and bowel sounds normal  MS: no gross musculoskeletal defects noted, no edema  SKIN: no suspicious lesions or rashes  NEURO: Normal strength and tone, mentation intact and speech normal  PSYCH: mentation appears normal, affect normal/bright  LYMPH: no cervical, supraclavicular, axillary, or inguinal adenopathy    ASSESSMENT/PLAN:   (Z00.01) Encounter for routine adult medical exam with abnormal findings  (primary encounter diagnosis)  Comment:   Plan: Lipid panel reflex to direct LDL Fasting,         Glucose, CBC with platelets, TSH with free T4         reflex            (Z30.41) Encounter for surveillance of contraceptive pills  Comment:   Plan: norethindrone-ethinyl estradiol (ORTHO-NOVUM         1-35 TAB,NORTREL 1-35 TAB) 1-35 MG-MCG per         tablet            (J45.20) Mild intermittent asthma without complication  Comment:   Plan:     (J30.2) Chronic seasonal allergic rhinitis due to other allergen  Comment:   Plan: cetirizine (ZYRTEC) 10 MG tablet, fluticasone         (FLONASE) 50 MCG/ACT spray              COUNSELING:   Reviewed preventive health counseling, as reflected in patient instructions         reports that she has never smoked. She has never used smokeless tobacco.    Estimated body mass index is 23.32 kg/(m^2) as calculated from the following:    Height as of this encounter: 5' 6.73\" (1.695 m).    Weight as of this encounter: 147 lb 11.2 oz (67 kg).       Counseling Resources:  ATP IV Guidelines  Pooled Cohorts Equation Calculator  Breast Cancer Risk Calculator  FRAX Risk Assessment  ICSI Preventive Guidelines  Dietary Guidelines for Americans, 2010  USDA's MyPlate  ASA Prophylaxis  Lung CA Screening    MARY JO Willams [facility]  "

## 2018-05-18 ENCOUNTER — OFFICE VISIT (OUTPATIENT)
Dept: FAMILY MEDICINE | Facility: CLINIC | Age: 48
End: 2018-05-18
Payer: COMMERCIAL

## 2018-05-18 VITALS
HEART RATE: 69 BPM | OXYGEN SATURATION: 97 % | BODY MASS INDEX: 23.18 KG/M2 | WEIGHT: 147.7 LBS | SYSTOLIC BLOOD PRESSURE: 116 MMHG | DIASTOLIC BLOOD PRESSURE: 78 MMHG | TEMPERATURE: 97.4 F | HEIGHT: 67 IN

## 2018-05-18 DIAGNOSIS — Z00.01 ENCOUNTER FOR ROUTINE ADULT MEDICAL EXAM WITH ABNORMAL FINDINGS: Primary | ICD-10-CM

## 2018-05-18 DIAGNOSIS — J30.2 CHRONIC SEASONAL ALLERGIC RHINITIS DUE TO OTHER ALLERGEN: ICD-10-CM

## 2018-05-18 DIAGNOSIS — J45.20 MILD INTERMITTENT ASTHMA WITHOUT COMPLICATION: ICD-10-CM

## 2018-05-18 DIAGNOSIS — Z30.41 ENCOUNTER FOR SURVEILLANCE OF CONTRACEPTIVE PILLS: ICD-10-CM

## 2018-05-18 LAB
ERYTHROCYTE [DISTWIDTH] IN BLOOD BY AUTOMATED COUNT: 12.1 % (ref 10–15)
HCT VFR BLD AUTO: 39.6 % (ref 35–47)
HGB BLD-MCNC: 13.2 G/DL (ref 11.7–15.7)
MCH RBC QN AUTO: 29.7 PG (ref 26.5–33)
MCHC RBC AUTO-ENTMCNC: 33.3 G/DL (ref 31.5–36.5)
MCV RBC AUTO: 89 FL (ref 78–100)
PLATELET # BLD AUTO: 316 10E9/L (ref 150–450)
RBC # BLD AUTO: 4.44 10E12/L (ref 3.8–5.2)
WBC # BLD AUTO: 8.7 10E9/L (ref 4–11)

## 2018-05-18 PROCEDURE — 85027 COMPLETE CBC AUTOMATED: CPT | Performed by: NURSE PRACTITIONER

## 2018-05-18 PROCEDURE — 80061 LIPID PANEL: CPT | Performed by: NURSE PRACTITIONER

## 2018-05-18 PROCEDURE — 99396 PREV VISIT EST AGE 40-64: CPT | Performed by: NURSE PRACTITIONER

## 2018-05-18 PROCEDURE — 84443 ASSAY THYROID STIM HORMONE: CPT | Performed by: NURSE PRACTITIONER

## 2018-05-18 PROCEDURE — 36415 COLL VENOUS BLD VENIPUNCTURE: CPT | Performed by: NURSE PRACTITIONER

## 2018-05-18 PROCEDURE — 82947 ASSAY GLUCOSE BLOOD QUANT: CPT | Performed by: NURSE PRACTITIONER

## 2018-05-18 RX ORDER — ALBUTEROL SULFATE 90 UG/1
2 AEROSOL, METERED RESPIRATORY (INHALATION) EVERY 6 HOURS PRN
Qty: 1 INHALER | Refills: 0 | Status: SHIPPED | OUTPATIENT
Start: 2018-05-18 | End: 2018-11-09

## 2018-05-18 RX ORDER — FLUTICASONE PROPIONATE 50 MCG
1-2 SPRAY, SUSPENSION (ML) NASAL DAILY
Qty: 3 BOTTLE | Refills: 11 | Status: SHIPPED | OUTPATIENT
Start: 2018-05-18 | End: 2020-05-01

## 2018-05-18 RX ORDER — CETIRIZINE HYDROCHLORIDE 10 MG/1
10 TABLET ORAL EVERY EVENING
Qty: 90 TABLET | Refills: 3 | Status: SHIPPED | OUTPATIENT
Start: 2018-05-18 | End: 2022-07-05

## 2018-05-18 NOTE — MR AVS SNAPSHOT
After Visit Summary   5/18/2018    Wanda Orellana    MRN: [id number]           Patient Information     Date Of Birth          1970        Visit Information        Provider Department      5/18/2018 10:45 AM Carol Bahena APRN [facility]        Today's Diagnoses     Encounter for routine adult medical exam with abnormal findings    -  1    Encounter for surveillance of contraceptive pills        Mild intermittent asthma without complication        Chronic seasonal allergic rhinitis due to other allergen          Care Instructions    For allergies - antihistamine (like zyrtec), add fluticasone (generic flonase), ketotifen or patanol antihistamine eye drops  We can add singulair if needed    Preventive Health Recommendations  Female Ages 40 to 49    Yearly exam:     See your health care provider every year in order to  1. Review health changes.   2. Discuss preventive care.    3. Review your medicines if your doctor prescribed any.      Get a Pap test every three years (unless you have an abnormal result and your provider advises testing more often).      If you get Pap tests with HPV test, you only need to test every 5 years, unless you have an abnormal result. You do not need a Pap test if your uterus was removed (hysterectomy) and you have not had cancer.      You should be tested each year for STDs (sexually transmitted diseases), if you're at risk.       Ask your doctor if you should have a mammogram.      Have a colonoscopy (test for colon cancer) if someone in your family has had colon cancer or polyps before age 50.       Have a cholesterol test every 5 years.       Have a diabetes test (fasting glucose) after age 45. If you are at risk for diabetes, you should have this test every 3 years.    Shots: Get a flu shot each year. Get a tetanus shot every 10 years.     Nutrition:     Eat at least 5 servings of fruits and vegetables each day.    Eat whole-grain bread,  "whole-wheat pasta and brown rice instead of white grains and rice.    Talk to your provider about Calcium and Vitamin D.     Lifestyle    Exercise at least 150 minutes a week (an average of 30 minutes a day, 5 days a week). This will help you control your weight and prevent disease.    Limit alcohol to one drink per day.    No smoking.     Wear sunscreen to prevent skin cancer.    See your dentist every six months for an exam and cleaning.          Follow-ups after your visit        Who to contact     If you have questions or need follow up information about today's clinic visit or your schedule please contact [facility] [place] at [phone].  Normal or non-critical lab and imaging results will be communicated to you by Z[employer], letter or phone within 4 business days after the clinic has received the results. If you do not hear from us within 7 days, please contact the clinic through [employer] or phone. If you have a critical or abnormal lab result, we will notify you by phone as soon as possible.  Submit refill requests through [employer] or call your pharmacy and they will forward the refill request to us. Please allow 3 business days for your refill to be completed.          Additional Information About Your Visit        [employer] Information     [employer] gives you secure access to your electronic health record. If you see a primary care provider, you can also send messages to your care team and make appointments. If you have questions, please call your primary care clinic.  If you do not have a primary care provider, please call [phone] and they will assist you.        Care EveryWhere ID     This is your Care EveryWhere ID. This could be used by other organizations to access your [place] medical records  [id number]        Your Vitals Were     Pulse Temperature Height Pulse Oximetry BMI (Body Mass Index)       69 97.4  F (36.3  C) (Oral) 5' 6.73\" (1.695 m) 97% 23.32 kg/m2        Blood Pressure " from Last 3 Encounters:   05/18/18 116/78   03/24/17 112/73   01/19/17 117/76    Weight from Last 3 Encounters:   05/18/18 147 lb 11.2 oz (67 kg)   03/24/17 147 lb 4.8 oz (66.8 kg)   01/19/17 147 lb 11.2 oz (67 kg)              We Performed the Following     Asthma Action Plan (AAP)     CBC with platelets     Glucose     Lipid panel reflex to direct LDL Fasting     TSH with free T4 reflex          Today's Medication Changes          These changes are accurate as of 5/18/18 11:45 AM.  If you have any questions, ask your nurse or doctor.               Start taking these medicines.        Dose/Directions    fluticasone 50 MCG/ACT spray   Commonly known as:  FLONASE   Used for:  Chronic seasonal allergic rhinitis due to other allergen   Started by:  Carol Bahena APRN CNP        Dose:  1-2 spray   Spray 1-2 sprays into both nostrils daily   Quantity:  3 Bottle   Refills:  11         These medicines have changed or have updated prescriptions.        Dose/Directions    * ZYRTEC ALLERGY PO   This may have changed:  Another medication with the same name was added. Make sure you understand how and when to take each.   Changed by:  Carol Bahena APRN CNP        Dose:  1 tablet   Take 1 tablet by mouth daily.   Refills:  0       * cetirizine 10 MG tablet   Commonly known as:  zyrTEC   This may have changed:  You were already taking a medication with the same name, and this prescription was added. Make sure you understand how and when to take each.   Used for:  Chronic seasonal allergic rhinitis due to other allergen   Changed by:  Carol Bahena APRN CNP        Dose:  10 mg   Take 1 tablet (10 mg) by mouth every evening   Quantity:  90 tablet   Refills:  3       * Notice:  This list has 2 medication(s) that are the same as other medications prescribed for you. Read the directions carefully, and ask your doctor or other care provider to review them with you.         Where to get your medicines      These medications  were sent to [facility]/pharmacy #[id number] - [place], [place] - [address]  [address] [address]     Phone:  [phone]     albuterol 108 (90 Base) MCG/ACT Inhaler    cetirizine 10 MG tablet    fluticasone 50 MCG/ACT spray    norethindrone-ethinyl estradiol 1-35 MG-MCG per tablet                Primary Care Provider Fax #    Physician No Ref-Primary [phone]       No address on file        Equal Access to Services     MAITE URBINA : Hadii aad ku hadasho Soomaali, waaxda luqadaha, qaybta kaalmada adeegyada, waxay idiin hayanupaman gayle rey. So [facility] [phone].    ATENCIÓN: Si habla español, tiene a danielle disposición servicios gratuitos de asistencia lingüística. [facility] [phone].    We comply with applicable federal civil rights laws and [place] laws. We do not discriminate on the basis of race, color, national origin, age, disability, sex, sexual orientation, or gender identity.            Thank you!     Thank you for choosing [facility]  for your care. Our goal is always to provide you with excellent care. Hearing back from our patients is one way we can continue to improve our services. Please take a few minutes to complete the written survey that you may receive in the mail after your visit with us. Thank you!             Your Updated Medication List - Protect others around you: Learn how to safely use, store and throw away your medicines at www.disposemymeds.org.          This list is accurate as of 5/18/18 11:45 AM.  Always use your most recent med list.                   Brand Name Dispense Instructions for use Diagnosis    albuterol 108 (90 Base) MCG/ACT Inhaler    PROAIR HFA/PROVENTIL HFA/VENTOLIN HFA    1 Inhaler    Inhale 2 puffs into the lungs every 6 hours as needed for shortness of breath / dyspnea or wheezing        fluticasone 50 MCG/ACT spray    FLONASE    3 Bottle    Spray 1-2 sprays into both nostrils daily    Chronic seasonal allergic  rhinitis due to other allergen       multivitamin, therapeutic with minerals Tabs tablet      Take 1 tablet by mouth daily        norethindrone-ethinyl estradiol 1-35 MG-MCG per tablet    ORTHO-NOVUM 1-35 TAB,NORTREL 1-35 TAB    84 tablet    Take 1 tablet by mouth daily    Encounter for surveillance of contraceptive pills       vitamin D 2000 units tablet      Take 1 tablet by mouth daily.        vitamin E 400 units Tabs      Take 400 Units by mouth daily.        * ZYRTEC ALLERGY PO      Take 1 tablet by mouth daily.        * cetirizine 10 MG tablet    zyrTEC    90 tablet    Take 1 tablet (10 mg) by mouth every evening    Chronic seasonal allergic rhinitis due to other allergen       * Notice:  This list has 2 medication(s) that are the same as other medications prescribed for you. Read the directions carefully, and ask your doctor or other care provider to review them with you.

## 2018-05-18 NOTE — LETTER
My Asthma Action Plan  Name: Wanda Orellana   YOB: 1970  Date: 5/18/2018   My doctor: MARY JO Willams CNP   [place] [facility]        My Rescue Medicine: Albuterol nebulizer solution always   My Asthma Severity: mild persistent  Avoid your asthma triggers: always                GREEN ZONE   Good Control    I feel good    No cough or wheeze    Can work, sleep and play without asthma symptoms       Take your asthma control medicine every day.     1. If exercise triggers your asthma, take your rescue medication    15 minutes before exercise or sports, and    During exercise if you have asthma symptoms  2. Spacer to use with inhaler: If you have a spacer, make sure to use it with your inhaler             YELLOW ZONE Getting Worse  I have ANY of these:    I do not feel good    Cough or wheeze    Chest feels tight    Wake up at night   1. Keep taking your Green Zone medications  2. Start taking your rescue medicine:    every 20 minutes for up to 1 hour. Then every 4 hours for 24-48 hours.  3. If you stay in the Yellow Zone for more than 12-24 hours, contact your doctor.  4. If you do not return to the Green Zone in 12-24 hours or you get worse, start taking your oral steroid medicine if prescribed by your provider.           RED ZONE Medical Alert - Get Help  I have ANY of these:    I feel awful    Medicine is not helping    Breathing getting harder    Trouble walking or talking    Nose opens wide to breathe       1. Take your rescue medicine NOW  2. If your provider has prescribed an oral steroid medicine, start taking it NOW  3. Call your doctor NOW  4. If you are still in the Red Zone after 20 minutes and you have not reached your doctor:    Take your rescue medicine again and    Call 911 or go to the emergency room right away    See your regular doctor within 2 weeks of an Emergency Room or Urgent Care visit for follow-up treatment.          Annual Reminders:  Meet with Asthma  Educator,  Flu Shot in the Fall, consider Pneumonia Vaccination for patients with asthma (aged 19 and older).    Pharmacy: [facility]PHARMACY #[id number] - [address]                      Asthma Triggers  How To Control Things That Make Your Asthma Worse    Triggers are things that make your asthma worse.  Look at the list below to help you find your triggers and what you can do about them.  You can help prevent asthma flare-ups by staying away from your triggers.      Trigger                                                          What you can do   Cigarette Smoke  Tobacco smoke can make asthma worse. Do not allow smoking in your home, car or around you.  Be sure no one smokes at a child s day care or school.  If you smoke, ask your health care provider for ways to help you quit.  Ask family members to quit too.  Ask your health care provider for a referral to Quit Plan to help you quit smoking, or call 7-447-542-PLAN.     Colds, Flu, Bronchitis  These are common triggers of asthma. Wash your hands often.  Don t touch your eyes, nose or mouth.  Get a flu shot every year.     Dust Mites  These are tiny bugs that live in cloth or carpet. They are too small to see. Wash sheets and blankets in hot water every week.   Encase pillows and mattress in dust mite proof covers.  Avoid having carpet if you can. If you have carpet, vacuum weekly.   Use a dust mask and HEPA vacuum.   Pollen and Outdoor Mold  Some people are allergic to trees, grass, or weed pollen, or molds. Try to keep your windows closed.  Limit time out doors when pollen count is high.   Ask you health care provider about taking medicine during allergy season.     Animal Dander  Some people are allergic to skin flakes, urine or saliva from pets with fur or feathers. Keep pets with fur or feathers out of your home.    If you can t keep the pet outdoors, then keep the pet out of your bedroom.  Keep the bedroom door closed.  Keep pets off cloth  furniture and away from stuffed toys.     Mice, Rats, and Cockroaches  Some people are allergic to the waste from these pests.   Cover food and garbage.  Clean up spills and food crumbs.  Store grease in the refrigerator.   Keep food out of the bedroom.   Indoor Mold  This can be a trigger if your home has high moisture. Fix leaking faucets, pipes, or other sources of water.   Clean moldy surfaces.  Dehumidify basement if it is damp and smelly.   Smoke, Strong Odors, and Sprays  These can reduce air quality. Stay away from strong odors and sprays, such as perfume, powder, hair spray, paints, smoke incense, paint, cleaning products, candles and new carpet.   Exercise or Sports  Some people with asthma have this trigger. Be active!  Ask your doctor about taking medicine before sports or exercise to prevent symptoms.    Warm up for 5-10 minutes before and after sports or exercise.     Other Triggers of Asthma  Cold air:  Cover your nose and mouth with a scarf.  Sometimes laughing or crying can be a trigger.  Some medicines and food can trigger asthma.

## 2018-05-19 LAB
CHOLEST SERPL-MCNC: 177 MG/DL
GLUCOSE SERPL-MCNC: 76 MG/DL (ref 70–99)
HDLC SERPL-MCNC: 57 MG/DL
LDLC SERPL CALC-MCNC: 92 MG/DL
NONHDLC SERPL-MCNC: 120 MG/DL
TRIGL SERPL-MCNC: 138 MG/DL
TSH SERPL DL<=0.005 MIU/L-ACNC: 2.31 MU/L (ref 0.4–4)

## 2018-05-19 ASSESSMENT — ASTHMA QUESTIONNAIRES: ACT_TOTALSCORE: 23

## 2018-05-23 NOTE — PROGRESS NOTES
Wanda,    Your labs were all normal.  I need to call over to the transplant clinic to get their suggestions.  No one here had any specific ideas other than calling the transplant clinic.  I will let you know what I find out.  If you have any questions, please feel free to contact the clinic.    KATHERIN Santos

## 2018-06-20 ENCOUNTER — TELEPHONE (OUTPATIENT)
Dept: FAMILY MEDICINE | Facility: CLINIC | Age: 48
End: 2018-06-20

## 2018-06-21 NOTE — TELEPHONE ENCOUNTER
Yes, please call the patient with the info.  I think she is looking more for emotional counseling than information and has had some interaction with the clinic, so possibly talking to Mercedez isn't what she is looking for.  If that is the case, then let me know and I would recommend a regular therapist.  KATHERIN Santos

## 2018-06-21 NOTE — TELEPHONE ENCOUNTER
MARY JO Friedman    Writer spoke with Mercedez, the living donor coordinator at the [facility]--she stated she would be the right person for the patient to speak to and she would be able to answer all of the patient's questions.     Her direct number is [phone]    Do you want me to call patient with the information?    Thanks! Cristina Alexis RN

## 2018-06-21 NOTE — TELEPHONE ENCOUNTER
Could you please contact the solid organ transplant clinic.  I am trying to find out what they recommend for counseling for patients considering being an organ donor.  I am aware that once they are an organ donor that the clinic provides counseling, but this patient has not decided that yet and her mother is in need of a kidney donation.  Wondering if there are any transplant specific counselors they recommend.  Thanks.  KATHERIN Santos

## 2018-06-21 NOTE — TELEPHONE ENCOUNTER
Attempted to call patient. Unable to leave VM, mailbox full. Will retry.    Ese Matthews RN  [facility]

## 2018-06-29 NOTE — TELEPHONE ENCOUNTER
Called patient. Given information/number for living donor coordinator [phone].    Ese Matthews RN  [facility]

## 2018-09-07 ENCOUNTER — ALLIED HEALTH/NURSE VISIT (OUTPATIENT)
Dept: NURSING | Facility: CLINIC | Age: 48
End: 2018-09-07
Payer: COMMERCIAL

## 2018-09-07 DIAGNOSIS — Z23 NEED FOR PROPHYLACTIC VACCINATION AND INOCULATION AGAINST INFLUENZA: Primary | ICD-10-CM

## 2018-09-07 PROCEDURE — 90471 IMMUNIZATION ADMIN: CPT

## 2018-09-07 PROCEDURE — 99207 ZZC NO CHARGE NURSE ONLY: CPT

## 2018-09-07 PROCEDURE — 90686 IIV4 VACC NO PRSV 0.5 ML IM: CPT

## 2018-09-07 NOTE — MR AVS SNAPSHOT
After Visit Summary   9/7/2018    Wanda Orellana    MRN: [id number]           Patient Information     Date Of Birth          1970        Visit Information        Provider Department      9/7/2018 9:00 AM AUSTIN DUMAS MA/LPN [facility]        Today's Diagnoses     Need for prophylactic vaccination and inoculation against influenza    -  1       Follow-ups after your visit        Who to contact     If you have questions or need follow up information about today's clinic visit or your schedule please contact [facility] directly at [phone].  Normal or non-critical lab and imaging results will be communicated to you by [employer], letter or phone within 4 business days after the clinic has received the results. If you do not hear from us within 7 days, please contact the clinic through [employer] or phone. If you have a critical or abnormal lab result, we will notify you by phone as soon as possible.  Submit refill requests through [employer] or call your pharmacy and they will forward the refill request to us. Please allow 3 business days for your refill to be completed.          Additional Information About Your Visit        MyChart Information     [employer] gives you secure access to your electronic health record. If you see a primary care provider, you can also send messages to your care team and make appointments. If you have questions, please call your primary care clinic.  If you do not have a primary care provider, please call [phone] and they will assist you.        Care EveryWhere ID     This is your Care EveryWhere ID. This could be used by other organizations to access your [place] medical records  [id number]         Blood Pressure from Last 3 Encounters:   05/18/18 116/78   03/24/17 112/73   01/19/17 117/76    Weight from Last 3 Encounters:   05/18/18 147 lb 11.2 oz (67 kg)   03/24/17 147 lb 4.8 oz (66.8 kg)   01/19/17 147 lb 11.2 oz (67 kg)              We  Performed the Following     FLU VACCINE, SPLIT VIRUS, IM (QUADRIVALENT) [70799]- >3 YRS     Vaccine Administration, Initial [15709]        Primary Care Provider Fax #    Physician No Ref-Primary [phone]       No address on file        Equal Access to Services     MAITE URBINA : Hadii aad ku hadyousif Wilkinson, waprem luqadaha, camta kaaljaki curtis, seferino ettain hayaabrittny castillonik salazar jorden rey. So [facility] [phone].    ATENCIÓN: Si habla español, tiene a danielle disposición servicios gratuitos de asistencia lingüística. Llame al [phone].    We comply with applicable federal civil rights laws and [place] laws. We do not discriminate on the basis of race, color, national origin, age, disability, sex, sexual orientation, or gender identity.            Thank you!     Thank you for choosing [facility]  for your care. Our goal is always to provide you with excellent care. Hearing back from our patients is one way we can continue to improve our services. Please take a few minutes to complete the written survey that you may receive in the mail after your visit with us. Thank you!             Your Updated Medication List - Protect others around you: Learn how to safely use, store and throw away your medicines at www.disposemymeds.org.          This list is accurate as of 9/7/18  9:10 AM.  Always use your most recent med list.                   Brand Name Dispense Instructions for use Diagnosis    albuterol 108 (90 Base) MCG/ACT inhaler    PROAIR HFA/PROVENTIL HFA/VENTOLIN HFA    1 Inhaler    Inhale 2 puffs into the lungs every 6 hours as needed for shortness of breath / dyspnea or wheezing        fluticasone 50 MCG/ACT spray    FLONASE    3 Bottle    Spray 1-2 sprays into both nostrils daily    Chronic seasonal allergic rhinitis due to other allergen       multivitamin, therapeutic with minerals Tabs tablet      Take 1 tablet by mouth daily        norethindrone-ethinyl estradiol 1-35 MG-MCG per tablet     ORTHO-NOVUM 1-35 TAB,NORTREL 1-35 TAB    84 tablet    Take 1 tablet by mouth daily    Encounter for surveillance of contraceptive pills       vitamin D 2000 units tablet      Take 1 tablet by mouth daily.        vitamin E 400 units Tabs      Take 400 Units by mouth daily.        * ZYRTEC ALLERGY PO      Take 1 tablet by mouth daily.        * cetirizine 10 MG tablet    zyrTEC    90 tablet    Take 1 tablet (10 mg) by mouth every evening    Chronic seasonal allergic rhinitis due to other allergen       * Notice:  This list has 2 medication(s) that are the same as other medications prescribed for you. Read the directions carefully, and ask your doctor or other care provider to review them with you.

## 2018-09-07 NOTE — PROGRESS NOTES

## 2018-11-09 DIAGNOSIS — J45.20 MILD INTERMITTENT ASTHMA: Primary | ICD-10-CM

## 2018-11-09 RX ORDER — ALBUTEROL SULFATE 90 UG/1
2 AEROSOL, METERED RESPIRATORY (INHALATION) EVERY 6 HOURS PRN
Qty: 1 INHALER | Refills: 0 | Status: SHIPPED | OUTPATIENT
Start: 2018-11-09 | End: 2020-02-24

## 2018-11-09 NOTE — TELEPHONE ENCOUNTER
"Requested Prescriptions   Pending Prescriptions Disp Refills     albuterol (PROAIR HFA/PROVENTIL HFA/VENTOLIN HFA) 108 (90 Base) MCG/ACT inhaler 1 Inhaler 0    Last Written Prescription Date:  5/18/18  Last Fill Quantity: 1,  # refills: 0   Last office visit: 5/18/2018 with prescribing provider:  5/18/18   Future Office Visit:     Sig: Inhale 2 puffs into the lungs every 6 hours as needed for shortness of breath / dyspnea or wheezing    Asthma Maintenance Inhalers - Anticholinergics Passed    11/9/2018 10:49 AM       Passed - Patient is age 12 years or older       Passed - Asthma control assessment score within normal limits in last 6 months    Please review ACT score.          Passed - Recent (6 mo) or future (30 days) visit within the authorizing provider's specialty    Patient had office visit in the last 6 months or has a visit in the next 30 days with authorizing provider or within the authorizing provider's specialty.  See \"Patient Info\" tab in inbasket, or \"Choose Columns\" in Meds & Orders section of the refill encounter.              "

## 2018-11-09 NOTE — TELEPHONE ENCOUNTER
Prescription approved per [facility] Refill Protocol.    Ese Matthews RN  [facility]

## 2018-11-09 NOTE — TELEPHONE ENCOUNTER
The patient stated she has been having trouble breathing the last couple of days and thinks she may be using an old inhaler

## 2018-11-20 ENCOUNTER — TELEPHONE (OUTPATIENT)
Dept: FAMILY MEDICINE | Facility: CLINIC | Age: 48
End: 2018-11-20

## 2018-11-20 NOTE — TELEPHONE ENCOUNTER
Started about 1 week ago with SOB, not every day  Tingling in feet started on Sunday  Feels a bit nauseous today but is due for her period soon and had a large mocha today    hasn't used the inhaler today, used a few days ago, helped some  feels slight pressure on chest that comes and goes     Denies cough, no sinus pressure  She has had increased stress in her life    Reviewed inhaler technique, she had not been using good technique  Instructed on relaxation methods, breathing, she felt better after doing    She will seek ED/UC if symptoms get worse before appointment tomorrow  Offered to find appointment today, she declined d/t work  Schedule    Antionette Slater RN   [facility]

## 2018-11-21 ENCOUNTER — OFFICE VISIT (OUTPATIENT)
Dept: FAMILY MEDICINE | Facility: CLINIC | Age: 48
End: 2018-11-21
Payer: COMMERCIAL

## 2018-11-21 VITALS
TEMPERATURE: 98.7 F | BODY MASS INDEX: 23.21 KG/M2 | OXYGEN SATURATION: 99 % | WEIGHT: 147 LBS | SYSTOLIC BLOOD PRESSURE: 118 MMHG | HEART RATE: 92 BPM | DIASTOLIC BLOOD PRESSURE: 74 MMHG | RESPIRATION RATE: 14 BRPM

## 2018-11-21 DIAGNOSIS — R07.89 CHEST TIGHTNESS OR PRESSURE: Primary | ICD-10-CM

## 2018-11-21 DIAGNOSIS — M79.10 TRIGGER POINT: ICD-10-CM

## 2018-11-21 DIAGNOSIS — F43.22 ADJUSTMENT DISORDER WITH ANXIOUS MOOD: ICD-10-CM

## 2018-11-21 PROCEDURE — 99214 OFFICE O/P EST MOD 30 MIN: CPT | Performed by: NURSE PRACTITIONER

## 2018-11-21 NOTE — PROGRESS NOTES
SUBJECTIVE:   Wanda Orellana is a 48 year old female who presents to clinic today for the following health issues:    RESPIRATORY SYMPTOMS      Duration: A few weeks ago    Description  Hard time breathing, pressure in chest    Severity: moderate and constant    Accompanying signs and symptoms: Nauseated yesterday. Had mocha yesterday (usually never gets those) and period today so wondering if that was why.     History (predisposing factors):  Asthma- exercise induced (hasnt been exercising)     PSTD- bearing down seems to work     Precipitating or alleviating factors: None    Therapies tried and outcome:  Inhaler but she never really needs it, has only used a few times    Tingling toes and feet on Sunday   New finding not there now  briefly   Pressure point her back none in her arms, needs massage  Tried inhaler    History of SVT   Anxiety not diagnosed but does say peoplehave thought she might be  Dealing with sick mom with needs, kids, lots of things constantly going on, no time to stop and think if she's coping ok  Busy at work, manager      Problem list and histories reviewed & adjusted, as indicated.  Additional history: as documented    Patient Active Problem List   Diagnosis     GERD (gastroesophageal reflux disease)     AMA (advanced maternal age) primigravida 35+     Mucous polyp of cervix     CARDIOVASCULAR SCREENING; LDL GOAL LESS THAN 160     Mild intermittent asthma     Congenital uterine anomaly     H/O paroxysmal supraventricular tachycardia     Chronic seasonal allergic rhinitis due to other allergen     Past Surgical History:   Procedure Laterality Date     DILATION AND CURETTAGE SUCTION  2/24/2012    Procedure:DILATION AND CURETTAGE SUCTION; Suction Dilation & Curettage   (11 weeks); Surgeon:CHESTER ALVAREZ; Location:[facility]     EYE SURGERY  2002    laser surg w/ complications of infections, etc     WISDOM TEETH[         Social History   Substance Use Topics     Smoking status: Never Smoker      Smokeless tobacco: Never Used     Alcohol use Yes      Comment: OCCASSIONALLY- 2 PER MONTH     Family History   Problem Relation Age of Onset     Diabetes Mother      Arthritis Mother      Circulatory Mother      Lipids Mother      Obesity Mother      Eye Disorder Mother      retinal myopathy     Hypertension Mother      KIDNEY DISEASE Mother      Asthma Mother      Respiratory Mother      emphysema-smoker     Diabetes Maternal Grandmother      Arthritis Maternal Grandmother      Unknown/Adopted Father      Unknown/Adopted Paternal Grandmother      Unknown/Adopted Paternal Grandfather      Alcohol/Drug Maternal Aunt          Current Outpatient Prescriptions   Medication Sig Dispense Refill     albuterol (PROAIR HFA/PROVENTIL HFA/VENTOLIN HFA) 108 (90 Base) MCG/ACT inhaler Inhale 2 puffs into the lungs every 6 hours as needed for shortness of breath / dyspnea or wheezing 1 Inhaler 0     cetirizine (ZYRTEC) 10 MG tablet Take 1 tablet (10 mg) by mouth every evening 90 tablet 3     Cetirizine HCl (ZYRTEC ALLERGY PO) Take 1 tablet by mouth daily.       Cholecalciferol (VITAMIN D) 2000 UNIT tablet Take 1 tablet by mouth daily.       fluticasone (FLONASE) 50 MCG/ACT spray Spray 1-2 sprays into both nostrils daily 3 Bottle 11     multivitamin, therapeutic with minerals (THERA-VIT-M) TABS Take 1 tablet by mouth daily       norethindrone-ethinyl estradiol (ORTHO-NOVUM 1-35 TAB,NORTREL 1-35 TAB) 1-35 MG-MCG per tablet Take 1 tablet by mouth daily 84 tablet 4     vitamin E 400 UNIT TABS Take 400 Units by mouth daily.       Allergies   Allergen Reactions     Asa [Aspirin]      Codeine      BP Readings from Last 3 Encounters:   11/21/18 118/74   05/18/18 116/78   03/24/17 112/73    Wt Readings from Last 3 Encounters:   11/21/18 147 lb (66.7 kg)   05/18/18 147 lb 11.2 oz (67 kg)   03/24/17 147 lb 4.8 oz (66.8 kg)                  Labs reviewed in EPIC    Reviewed and updated as needed this visit by clinical staff  Tobacco   Allergies  Meds       Reviewed and updated as needed this visit by Provider         ROS:  Constitutional, HEENT, cardiovascular, pulmonary, GI, , musculoskeletal, neuro, skin, endocrine and psych systems are negative, except as otherwise noted.    OBJECTIVE:     /74  Pulse 92  Temp 98.7  F (37.1  C) (Temporal)  Resp 14  Wt 147 lb (66.7 kg)  SpO2 99%  BMI 23.21 kg/m2  Body mass index is 23.21 kg/(m^2).  GENERAL: healthy, alert and no distress  EYES: Eyes grossly normal to inspection, PERRL and conjunctivae and sclerae normal  HENT: ear canals and TM's normal, nose and mouth without ulcers or lesions  NECK: no adenopathy, no asymmetry, masses, or scars and thyroid normal to palpation  RESP: lungs clear to auscultation - no rales, rhonchi or wheezes  CV: regular rates and rhythm, normal S1 S2, no S3 or S4, no murmur, click or rub, peripheral pulses strong, no peripheral edema and no bruits heard   ABDOMEN: soft, nontender, no hepatosplenomegaly, no masses and bowel sounds normal  MS: no gross musculoskeletal defects noted, no edema  SKIN: no suspicious lesions or rashes  NEURO: Normal strength and tone, sensory exam grossly normal, mentation intact, cranial nerves 2-12 intact, DTR's normal and symmetric , gait normal including heel/toe/tandem walking and rapid alternating movements normal  PSYCH: mentation appears normal, affect normal/bright, somewhat just slightly anxious, judgement and insight seems intact and appearance well groomed    Diagnostic Test Results:  none     ASSESSMENT/PLAN:       ICD-10-CM    1. Chest tightness or pressure R07.89    2. Trigger point M79.10    3. Adjustment disorder with anxious mood F43.22    normal exam today, after discussing numerous differentials do not feel this is heart related, lungs clear and pt doesn't feel is like her asthma exacerbations, discussed possibly starting to get viral upper respiratory infection as she did report ill contacts perhaps. Trigger  points along scapulae bilaterally so did recommend trial of massage, heat and stretching, NSAID if tolerated. Could be contributing.   With longer discussion pt did admit to feeling more anxious and lot of situational stress, will work on meditation and mikal breathing exercises, if numbness in toes returns let us know or if any red flag symptoms then ER. Pt verbalizes understanding.   Return to Clinic for mental health visit if counseling alone not helping enough for this, briefly discussed meds and she is not interested     Patient Instructions       Uncertain Causes of Chest Pain    Chest pain can happen for a number of reasons. Sometimes the cause can't be determined. If your condition does not seem serious, and your pain does not appear to be coming from your heart, your healthcare provider may recommend watching it closely. Sometimes the signs of a serious problem take more time to appear. Many problems not related to your heart can cause chest pain. These include:    Musculoskeletal. Costochondritis is an inflammation of the tissues around the ribs that can occur from trauma or overuse injuries, or a strain of the muscles of the chest wall    Respiratory. Pneumonia, collapsed lung (pneumothorax), or inflammation of the lining of the chest and lungs (pleurisy)    Gastrointestinal. Esophageal reflux, heartburn, ulcers, or gallbladder disease    Anxiety and panic disorders    Nerve compression and inflammation    Rare miscellaneous problems such as aortic aneurysm (a swelling of the large artery coming out of the heart) or pulmonary embolism (a blood clot in the lungs)  Home care  After your visit, follow these recommendations:    Rest today and avoid strenuous activity.    Take any prescribed medicine as directed.    Be aware of any recurrent chest pain and notice any changes  Follow-up care  Follow up with your healthcare provider if you do not start to feel better within 24 hours, or as advised.  Call  911  Call 911 if any of these occur:    A change in the type of pain: if it feels different, becomes more severe, lasts longer, or begins to spread into your shoulder, arm, neck, jaw or back    Shortness of breath or increased pain with breathing    Weakness, dizziness, or fainting    Rapid heart beat    Crushing sensation in your chest   When to seek medical advice  Call your healthcare provider right away if any of the following occur:    Cough with dark colored sputum (phlegm) or blood    Fever of 100.4 F (38 C) or higher, or as directed by your healthcare provider    Swelling, pain or redness in one leg  Date Last Reviewed: 5/1/2018 2000-2018 [employer]. [address] [place] [address], [place] [address]. All rights reserved. This information is not intended as a substitute for professional medical care. Always follow your healthcare professional's instructions.        Treating Anxiety Disorders with Therapy    If you have an anxiety disorder, you don t have to suffer anymore. Treatment is available. Therapy (also called counseling) is often a helpful treatment for anxiety disorders. With therapy, a specially trained professional (therapist) helps you face and learn to manage your anxiety. Therapy can be short-term or long-term depending on your needs. In some cases, medicine may also be prescribed with therapy. It may take time before you notice how much therapy is helping, but stick with it. With therapy, you can feel better.  Cognitive behavioral therapy (CBT)  Cognitive behavioral therapy (CBT) teaches you to manage anxiety. It does this by helping you understand how you think and act when you re anxious. Research has shown CBT to be a very effective treatment for anxiety disorders. How CBT is run is almost like a class. It involves homework and activities to build skills that teach you to cope with anxiety step by step. It can be done in a group or one-on-one, and often takes place for a set number  of sessions. CBT has two main parts:    Cognitive therapy helps you identify the negative, irrational thoughts that occur with your anxiety. You ll learn to replace these with more positive, realistic thoughts.    Behavioral therapy helps you change how you react to anxiety. You ll learn coping skills and methods for relaxing to help you better deal with anxiety.  Other forms of therapy  Other therapy methods may work better for you than CBT. Or, you may move from CBT to another form of therapy as your treatment needs change. This may mean meeting with a therapist by yourself or in a group. Therapy can also help you work through problems in your life, such as drug or alcohol dependence, that may be making your anxiety worse.  Getting better takes time  Therapy will help you feel better and teach you skills to help manage anxiety long term. But change doesn t happen right away. It takes a commitment from you. And treatment only works if you learn to face the causes of your anxiety. So, you might feel worse before you feel better. This can sometimes make it hard to stick with it. But remember: Therapy is a very effective treatment. The results will be well worth it.  Helping yourself  If anxiety is wearing you down, here are some things you can do to cope:    Check with your doctor and rule out any physical problems that may be causing the anxiety symptoms.    If an anxiety disorder is diagnosed, seek mental healthcare. This is an illness and it can respond to treatment. Most types of anxiety disorders will respond to talk therapy and medicine.    Educate yourself about anxiety disorders. Keep track of helpful online resources and books you can use during stressful periods.    Try stress management techniques such as meditation.    Consider online or in-person support groups.    Don t fight your feelings. Anxiety feeds itself. The more you worry about it, the worse it gets. Instead, try to identify what might have  triggered your anxiety. Then try to put this threat in perspective.    Keep in mind that you can t control everything about a situation. Change what you can and let the rest take its course.    Exercise -- it s a great way to relieve tension and help your body feel relaxed.    Examine your life for stress, and try to find ways to reduce it.    Avoid caffeine and nicotine, which can make anxiety symptoms worse.    Fight the temptation to turn to alcohol or unprescribed drugs for relief. They only make things worse in the long run.   Date Last Reviewed: 1/1/2017 2000-2018 [employer]. [address] [place] [address], [place] [address]. All rights reserved. This information is not intended as a substitute for professional medical care. Always follow your healthcare professional's instructions.        Understanding Adjustment Disorders    Most people have stress in their lives, and sometimes you may have more than you can handle. You may find it hard to cope with a stressful event. As a result, you may become anxious and depressed. You might even get sick. These can be symptoms of an adjustment disorder. But you don t have to suffer. Ask your healthcare provider or mental health professional for help.  Common symptoms of an adjustment disorder    Hopelessness    Frequent crying    Depressed mood    Trembling or twitching    Fast, pounding, or fluttering heartbeat (palpitations)    Health problems    Withdrawal    Anxiety or tension   What is an adjustment disorder?  Adjustment disorders sometimes occur when life gets to be too much. They often appear within 3 months of a stressful time. The symptoms vary widely. You might pretend the stressful event never happened. Or you might think about it so much you can t eat or sleep. In most cases, your feelings may seem beyond your control.  What causes it?  The events that trigger an adjustment disorder vary from person to person. Adults may be troubled by work, money,  or marriage problems. Teens are more likely bothered by school or conflict with parents. They also may find it hard to cope with a divorce or sex. The death of a loved one can be especially hard to face. So can major life changes such as a move. Poverty or a lack of social skills may make matters worse.  What can be done?  Adjustment disorders can almost always be helped by therapy. You may feel relieved just to talk to someone. In some cases, only you and your therapist will meet. In others, your whole family may be involved. You might also join a group for people with this disorder. The support and concern of others can help you recover more quickly.  Date Last Reviewed: 1/1/2017 2000-2018 [employer]. [address] [place] [address], [place] [address]. All rights reserved. This information is not intended as a substitute for professional medical care. Always follow your healthcare professional's instructions.            MARY JO Murguia [facility]

## 2018-11-21 NOTE — PATIENT INSTRUCTIONS
Uncertain Causes of Chest Pain    Chest pain can happen for a number of reasons. Sometimes the cause can't be determined. If your condition does not seem serious, and your pain does not appear to be coming from your heart, your healthcare provider may recommend watching it closely. Sometimes the signs of a serious problem take more time to appear. Many problems not related to your heart can cause chest pain. These include:    Musculoskeletal. Costochondritis is an inflammation of the tissues around the ribs that can occur from trauma or overuse injuries, or a strain of the muscles of the chest wall    Respiratory. Pneumonia, collapsed lung (pneumothorax), or inflammation of the lining of the chest and lungs (pleurisy)    Gastrointestinal. Esophageal reflux, heartburn, ulcers, or gallbladder disease    Anxiety and panic disorders    Nerve compression and inflammation    Rare miscellaneous problems such as aortic aneurysm (a swelling of the large artery coming out of the heart) or pulmonary embolism (a blood clot in the lungs)  Home care  After your visit, follow these recommendations:    Rest today and avoid strenuous activity.    Take any prescribed medicine as directed.    Be aware of any recurrent chest pain and notice any changes  Follow-up care  Follow up with your healthcare provider if you do not start to feel better within 24 hours, or as advised.  Call 911  Call 911 if any of these occur:    A change in the type of pain: if it feels different, becomes more severe, lasts longer, or begins to spread into your shoulder, arm, neck, jaw or back    Shortness of breath or increased pain with breathing    Weakness, dizziness, or fainting    Rapid heart beat    Crushing sensation in your chest   When to seek medical advice  Call your healthcare provider right away if any of the following occur:    Cough with dark colored sputum (phlegm) or blood    Fever of 100.4 F (38 C) or higher, or as directed by your  healthcare provider    Swelling, pain or redness in one leg  Date Last Reviewed: 5/1/2018 2000-2018 [employer]. [address] [place] [address], [place] [address]. All rights reserved. This information is not intended as a substitute for professional medical care. Always follow your healthcare professional's instructions.        Treating Anxiety Disorders with Therapy    If you have an anxiety disorder, you don t have to suffer anymore. Treatment is available. Therapy (also called counseling) is often a helpful treatment for anxiety disorders. With therapy, a specially trained professional (therapist) helps you face and learn to manage your anxiety. Therapy can be short-term or long-term depending on your needs. In some cases, medicine may also be prescribed with therapy. It may take time before you notice how much therapy is helping, but stick with it. With therapy, you can feel better.  Cognitive behavioral therapy (CBT)  Cognitive behavioral therapy (CBT) teaches you to manage anxiety. It does this by helping you understand how you think and act when you re anxious. Research has shown CBT to be a very effective treatment for anxiety disorders. How CBT is run is almost like a class. It involves homework and activities to build skills that teach you to cope with anxiety step by step. It can be done in a group or one-on-one, and often takes place for a set number of sessions. CBT has two main parts:    Cognitive therapy helps you identify the negative, irrational thoughts that occur with your anxiety. You ll learn to replace these with more positive, realistic thoughts.    Behavioral therapy helps you change how you react to anxiety. You ll learn coping skills and methods for relaxing to help you better deal with anxiety.  Other forms of therapy  Other therapy methods may work better for you than CBT. Or, you may move from CBT to another form of therapy as your treatment needs change. This may mean meeting  with a therapist by yourself or in a group. Therapy can also help you work through problems in your life, such as drug or alcohol dependence, that may be making your anxiety worse.  Getting better takes time  Therapy will help you feel better and teach you skills to help manage anxiety long term. But change doesn t happen right away. It takes a commitment from you. And treatment only works if you learn to face the causes of your anxiety. So, you might feel worse before you feel better. This can sometimes make it hard to stick with it. But remember: Therapy is a very effective treatment. The results will be well worth it.  Helping yourself  If anxiety is wearing you down, here are some things you can do to cope:    Check with your doctor and rule out any physical problems that may be causing the anxiety symptoms.    If an anxiety disorder is diagnosed, seek mental healthcare. This is an illness and it can respond to treatment. Most types of anxiety disorders will respond to talk therapy and medicine.    Educate yourself about anxiety disorders. Keep track of helpful online resources and books you can use during stressful periods.    Try stress management techniques such as meditation.    Consider online or in-person support groups.    Don t fight your feelings. Anxiety feeds itself. The more you worry about it, the worse it gets. Instead, try to identify what might have triggered your anxiety. Then try to put this threat in perspective.    Keep in mind that you can t control everything about a situation. Change what you can and let the rest take its course.    Exercise -- it s a great way to relieve tension and help your body feel relaxed.    Examine your life for stress, and try to find ways to reduce it.    Avoid caffeine and nicotine, which can make anxiety symptoms worse.    Fight the temptation to turn to alcohol or unprescribed drugs for relief. They only make things worse in the long run.   Date Last Reviewed:  1/1/2017 2000-2018 [employer]. [address] [place] [address], [place] [address]. All rights reserved. This information is not intended as a substitute for professional medical care. Always follow your healthcare professional's instructions.        Understanding Adjustment Disorders    Most people have stress in their lives, and sometimes you may have more than you can handle. You may find it hard to cope with a stressful event. As a result, you may become anxious and depressed. You might even get sick. These can be symptoms of an adjustment disorder. But you don t have to suffer. Ask your healthcare provider or mental health professional for help.  Common symptoms of an adjustment disorder    Hopelessness    Frequent crying    Depressed mood    Trembling or twitching    Fast, pounding, or fluttering heartbeat (palpitations)    Health problems    Withdrawal    Anxiety or tension   What is an adjustment disorder?  Adjustment disorders sometimes occur when life gets to be too much. They often appear within 3 months of a stressful time. The symptoms vary widely. You might pretend the stressful event never happened. Or you might think about it so much you can t eat or sleep. In most cases, your feelings may seem beyond your control.  What causes it?  The events that trigger an adjustment disorder vary from person to person. Adults may be troubled by work, money, or marriage problems. Teens are more likely bothered by school or conflict with parents. They also may find it hard to cope with a divorce or sex. The death of a loved one can be especially hard to face. So can major life changes such as a move. Poverty or a lack of social skills may make matters worse.  What can be done?  Adjustment disorders can almost always be helped by therapy. You may feel relieved just to talk to someone. In some cases, only you and your therapist will meet. In others, your whole family may be involved. You might also join a  group for people with this disorder. The support and concern of others can help you recover more quickly.  Date Last Reviewed: 1/1/2017 2000-2018 [employer]. [address] [place] [address], [place] [address]. All rights reserved. This information is not intended as a substitute for professional medical care. Always follow your healthcare professional's instructions.

## 2018-11-21 NOTE — MR AVS SNAPSHOT
After Visit Summary   11/21/2018    Wanda Orellana    MRN: [id number]           Patient Information     Date Of Birth          1970        Visit Information        Provider Department      11/21/2018 2:00 PM Amee Reeder APRN [facility]        Today's Diagnoses     Chest tightness or pressure    -  1    Trigger point        Adjustment disorder with anxious mood          Care Instructions      Uncertain Causes of Chest Pain    Chest pain can happen for a number of reasons. Sometimes the cause can't be determined. If your condition does not seem serious, and your pain does not appear to be coming from your heart, your healthcare provider may recommend watching it closely. Sometimes the signs of a serious problem take more time to appear. Many problems not related to your heart can cause chest pain. These include:    Musculoskeletal. Costochondritis is an inflammation of the tissues around the ribs that can occur from trauma or overuse injuries, or a strain of the muscles of the chest wall    Respiratory. Pneumonia, collapsed lung (pneumothorax), or inflammation of the lining of the chest and lungs (pleurisy)    Gastrointestinal. Esophageal reflux, heartburn, ulcers, or gallbladder disease    Anxiety and panic disorders    Nerve compression and inflammation    Rare miscellaneous problems such as aortic aneurysm (a swelling of the large artery coming out of the heart) or pulmonary embolism (a blood clot in the lungs)  Home care  After your visit, follow these recommendations:    Rest today and avoid strenuous activity.    Take any prescribed medicine as directed.    Be aware of any recurrent chest pain and notice any changes  Follow-up care  Follow up with your healthcare provider if you do not start to feel better within 24 hours, or as advised.  Call 911  Call 911 if any of these occur:    A change in the type of pain: if it feels different, becomes more severe, lasts  longer, or begins to spread into your shoulder, arm, neck, jaw or back    Shortness of breath or increased pain with breathing    Weakness, dizziness, or fainting    Rapid heart beat    Crushing sensation in your chest   When to seek medical advice  Call your healthcare provider right away if any of the following occur:    Cough with dark colored sputum (phlegm) or blood    Fever of 100.4 F (38 C) or higher, or as directed by your healthcare provider    Swelling, pain or redness in one leg  Date Last Reviewed: 5/1/2018 2000-2018 [employer]. [address] [place] [address]. All rights reserved. This information is not intended as a substitute for professional medical care. Always follow your healthcare professional's instructions.        Treating Anxiety Disorders with Therapy    If you have an anxiety disorder, you don t have to suffer anymore. Treatment is available. Therapy (also called counseling) is often a helpful treatment for anxiety disorders. With therapy, a specially trained professional (therapist) helps you face and learn to manage your anxiety. Therapy can be short-term or long-term depending on your needs. In some cases, medicine may also be prescribed with therapy. It may take time before you notice how much therapy is helping, but stick with it. With therapy, you can feel better.  Cognitive behavioral therapy (CBT)  Cognitive behavioral therapy (CBT) teaches you to manage anxiety. It does this by helping you understand how you think and act when you re anxious. Research has shown CBT to be a very effective treatment for anxiety disorders. How CBT is run is almost like a class. It involves homework and activities to build skills that teach you to cope with anxiety step by step. It can be done in a group or one-on-one, and often takes place for a set number of sessions. CBT has two main parts:    Cognitive therapy helps you identify the negative, irrational thoughts that  occur with your anxiety. You ll learn to replace these with more positive, realistic thoughts.    Behavioral therapy helps you change how you react to anxiety. You ll learn coping skills and methods for relaxing to help you better deal with anxiety.  Other forms of therapy  Other therapy methods may work better for you than CBT. Or, you may move from CBT to another form of therapy as your treatment needs change. This may mean meeting with a therapist by yourself or in a group. Therapy can also help you work through problems in your life, such as drug or alcohol dependence, that may be making your anxiety worse.  Getting better takes time  Therapy will help you feel better and teach you skills to help manage anxiety long term. But change doesn t happen right away. It takes a commitment from you. And treatment only works if you learn to face the causes of your anxiety. So, you might feel worse before you feel better. This can sometimes make it hard to stick with it. But remember: Therapy is a very effective treatment. The results will be well worth it.  Helping yourself  If anxiety is wearing you down, here are some things you can do to cope:    Check with your doctor and rule out any physical problems that may be causing the anxiety symptoms.    If an anxiety disorder is diagnosed, seek mental healthcare. This is an illness and it can respond to treatment. Most types of anxiety disorders will respond to talk therapy and medicine.    Educate yourself about anxiety disorders. Keep track of helpful online resources and books you can use during stressful periods.    Try stress management techniques such as meditation.    Consider online or in-person support groups.    Don t fight your feelings. Anxiety feeds itself. The more you worry about it, the worse it gets. Instead, try to identify what might have triggered your anxiety. Then try to put this threat in perspective.    Keep in mind that you can t control everything  about a situation. Change what you can and let the rest take its course.    Exercise -- it s a great way to relieve tension and help your body feel relaxed.    Examine your life for stress, and try to find ways to reduce it.    Avoid caffeine and nicotine, which can make anxiety symptoms worse.    Fight the temptation to turn to alcohol or unprescribed drugs for relief. They only make things worse in the long run.   Date Last Reviewed: 1/1/2017 2000-2018 [employer]. [address] [place] [address], [place] [address]. All rights reserved. This information is not intended as a substitute for professional medical care. Always follow your healthcare professional's instructions.        Understanding Adjustment Disorders    Most people have stress in their lives, and sometimes you may have more than you can handle. You may find it hard to cope with a stressful event. As a result, you may become anxious and depressed. You might even get sick. These can be symptoms of an adjustment disorder. But you don t have to suffer. Ask your healthcare provider or mental health professional for help.  Common symptoms of an adjustment disorder    Hopelessness    Frequent crying    Depressed mood    Trembling or twitching    Fast, pounding, or fluttering heartbeat (palpitations)    Health problems    Withdrawal    Anxiety or tension   What is an adjustment disorder?  Adjustment disorders sometimes occur when life gets to be too much. They often appear within 3 months of a stressful time. The symptoms vary widely. You might pretend the stressful event never happened. Or you might think about it so much you can t eat or sleep. In most cases, your feelings may seem beyond your control.  What causes it?  The events that trigger an adjustment disorder vary from person to person. Adults may be troubled by work, money, or marriage problems. Teens are more likely bothered by school or conflict with parents. They also may find it hard to  cope with a divorce or sex. The death of a loved one can be especially hard to face. So can major life changes such as a move. Poverty or a lack of social skills may make matters worse.  What can be done?  Adjustment disorders can almost always be helped by therapy. You may feel relieved just to talk to someone. In some cases, only you and your therapist will meet. In others, your whole family may be involved. You might also join a group for people with this disorder. The support and concern of others can help you recover more quickly.  Date Last Reviewed: 1/1/2017 2000-2018 [employer]. [address] [place] [address]. All rights reserved. This information is not intended as a substitute for professional medical care. Always follow your healthcare professional's instructions.                Follow-ups after your visit        Who to contact     If you have questions or need follow up information about today's clinic visit or your schedule please contact [facility] directly at [phone].  Normal or non-critical lab and imaging results will be communicated to you by [employer], letter or phone within 4 business days after the clinic has received the results. If you do not hear from us within 7 days, please contact the clinic through [employer] or phone. If you have a critical or abnormal lab result, we will notify you by phone as soon as possible.  Submit refill requests through [employer] or call your pharmacy and they will forward the refill request to us. Please allow 3 business days for your refill to be completed.          Additional Information About Your Visit        [employer] Information     [employer] gives you secure access to your electronic health record. If you see a primary care provider, you can also send messages to your care team and make appointments. If you have questions, please call your primary care clinic.  If you do not have a primary care provider, please call  [phone] and they will assist you.        Care EveryWhere ID     This is your Care EveryWhere ID. This could be used by other organizations to access your [place] medical records  [id number]        Your Vitals Were     Pulse Temperature Respirations Pulse Oximetry BMI (Body Mass Index)       92 98.7  F (37.1  C) (Temporal) 14 99% 23.21 kg/m2        Blood Pressure from Last 3 Encounters:   11/21/18 118/74   05/18/18 116/78   03/24/17 112/73    Weight from Last 3 Encounters:   11/21/18 147 lb (66.7 kg)   05/18/18 147 lb 11.2 oz (67 kg)   03/24/17 147 lb 4.8 oz (66.8 kg)              Today, you had the following     No orders found for display       Primary Care Provider Fax #    Physician No Ref-Primary [phone]       No address on file        Equal Access to Services     [facility] : Hadii chevy Wilkinson, wacarrieda jae, qaybta kaalmada ever, seferino leonardo . So [facility] [phone].    ATENCIÓN: Si habla español, tiene a danielle disposición servicios gratuitos de asistencia lingüística. Llame al [phone].    We comply with applicable federal civil rights laws and [place] laws. We do not discriminate on the basis of race, color, national origin, age, disability, sex, sexual orientation, or gender identity.            Thank you!     Thank you for choosing [facility]  for your care. Our goal is always to provide you with excellent care. Hearing back from our patients is one way we can continue to improve our services. Please take a few minutes to complete the written survey that you may receive in the mail after your visit with us. Thank you!             Your Updated Medication List - Protect others around you: Learn how to safely use, store and throw away your medicines at www.disposemymeds.org.          This list is accurate as of 11/21/18  5:12 PM.  Always use your most recent med list.                   Brand Name Dispense Instructions for use  Diagnosis    albuterol 108 (90 Base) MCG/ACT inhaler    PROAIR HFA/PROVENTIL HFA/VENTOLIN HFA    1 Inhaler    Inhale 2 puffs into the lungs every 6 hours as needed for shortness of breath / dyspnea or wheezing    Mild intermittent asthma       fluticasone 50 MCG/ACT spray    FLONASE    3 Bottle    Spray 1-2 sprays into both nostrils daily    Chronic seasonal allergic rhinitis due to other allergen       multivitamin, therapeutic with minerals Tabs tablet      Take 1 tablet by mouth daily        norethindrone-ethinyl estradiol 1-35 MG-MCG per tablet    ORTHO-NOVUM 1-35 TAB,NORTREL 1-35 TAB    84 tablet    Take 1 tablet by mouth daily    Encounter for surveillance of contraceptive pills       vitamin D3 2000 units tablet    CHOLECALCIFEROL     Take 1 tablet by mouth daily.        vitamin E 400 units Tabs      Take 400 Units by mouth daily.        * ZYRTEC ALLERGY PO      Take 1 tablet by mouth daily.        * cetirizine 10 MG tablet    zyrTEC    90 tablet    Take 1 tablet (10 mg) by mouth every evening    Chronic seasonal allergic rhinitis due to other allergen       * Notice:  This list has 2 medication(s) that are the same as other medications prescribed for you. Read the directions carefully, and ask your doctor or other care provider to review them with you.

## 2019-02-08 ENCOUNTER — MYC REFILL (OUTPATIENT)
Dept: FAMILY MEDICINE | Facility: CLINIC | Age: 49
End: 2019-02-08

## 2019-02-08 DIAGNOSIS — Z30.41 ENCOUNTER FOR SURVEILLANCE OF CONTRACEPTIVE PILLS: ICD-10-CM

## 2019-02-08 NOTE — TELEPHONE ENCOUNTER
Writer notes prescription sent 05/18/2018 #84/4    Called pharmacy, pharmacy staff states they have already filled the medication for patient. Pharmacy staff states to disregard the request    Medication removed    Brit Branham RN  Triage Nurse

## 2019-03-19 ENCOUNTER — TELEPHONE (OUTPATIENT)
Dept: FAMILY MEDICINE | Facility: CLINIC | Age: 49
End: 2019-03-19

## 2019-03-19 NOTE — TELEPHONE ENCOUNTER
Lizet,    Pt is coming in to see you on Wednesday at 5:30 pm for UTI symptoms, and a bump under her breast. She asked if she could do her physical as well at that time. I told her that I would need to check with you because I put her in your acute opening. Please advise.     Pt requesting call back with response.      Pt states that she has a bumb that didn't have any color that she could feel under her skin. It is under her breast above her abdomen. Now it has expanded to a makayla size. It is right where her bra rubs and it is starting to hurt.    Pt is also concerned for bladder or bacteria infection. She is feeling pressure, has smell, and burning sensation.     Pt wondering if she should see dermatology for bump or come into PCP. Recommended to pt that she either see Lizet for both issues, or that she see derm for bump, and do E-visit with Lizet for UTI symptoms. Pt decided to come into clinic to see Lizet for both.    Ese Matthews RN  [facility]

## 2019-03-19 NOTE — TELEPHONE ENCOUNTER
Reason for call:  Other   Patient called regarding (reason for call): call back  Additional comments: patient has a cyst on her abdomen and a possible bladder infection and she is wondering if she needs to make an appointment to be seen.     Phone number to reach patient:  Home number on file [phone] (home)    Best Time:  any    Can we leave a detailed message on this number?  YES

## 2019-03-20 ENCOUNTER — OFFICE VISIT (OUTPATIENT)
Dept: FAMILY MEDICINE | Facility: CLINIC | Age: 49
End: 2019-03-20
Payer: COMMERCIAL

## 2019-03-20 VITALS
HEIGHT: 67 IN | DIASTOLIC BLOOD PRESSURE: 78 MMHG | WEIGHT: 146 LBS | BODY MASS INDEX: 22.91 KG/M2 | HEART RATE: 50 BPM | SYSTOLIC BLOOD PRESSURE: 126 MMHG | TEMPERATURE: 99 F | OXYGEN SATURATION: 99 %

## 2019-03-20 DIAGNOSIS — Z00.01 ENCOUNTER FOR ROUTINE ADULT MEDICAL EXAM WITH ABNORMAL FINDINGS: Primary | ICD-10-CM

## 2019-03-20 DIAGNOSIS — Z30.41 ENCOUNTER FOR SURVEILLANCE OF CONTRACEPTIVE PILLS: ICD-10-CM

## 2019-03-20 DIAGNOSIS — Z13.1 SCREENING FOR DIABETES MELLITUS: ICD-10-CM

## 2019-03-20 DIAGNOSIS — R10.2 PELVIC PRESSURE IN FEMALE: ICD-10-CM

## 2019-03-20 DIAGNOSIS — Z12.4 SCREENING FOR CERVICAL CANCER: ICD-10-CM

## 2019-03-20 DIAGNOSIS — N89.8 VAGINAL ODOR: ICD-10-CM

## 2019-03-20 DIAGNOSIS — Z13.220 LIPID SCREENING: ICD-10-CM

## 2019-03-20 DIAGNOSIS — L72.3 INFLAMED SEBACEOUS CYST: ICD-10-CM

## 2019-03-20 LAB
ALBUMIN UR-MCNC: NEGATIVE MG/DL
APPEARANCE UR: CLEAR
BACTERIA #/AREA URNS HPF: ABNORMAL /HPF
BILIRUB UR QL STRIP: NEGATIVE
COLOR UR AUTO: YELLOW
GLUCOSE UR STRIP-MCNC: NEGATIVE MG/DL
HGB UR QL STRIP: ABNORMAL
KETONES UR STRIP-MCNC: NEGATIVE MG/DL
LEUKOCYTE ESTERASE UR QL STRIP: NEGATIVE
NITRATE UR QL: POSITIVE
NON-SQ EPI CELLS #/AREA URNS LPF: ABNORMAL /LPF
PH UR STRIP: 6.5 PH (ref 5–7)
RBC #/AREA URNS AUTO: ABNORMAL /HPF
SOURCE: ABNORMAL
SP GR UR STRIP: 1.02 (ref 1–1.03)
SPECIMEN SOURCE: ABNORMAL
UROBILINOGEN UR STRIP-ACNC: 0.2 EU/DL (ref 0.2–1)
WBC #/AREA URNS AUTO: ABNORMAL /HPF
WET PREP SPEC: ABNORMAL

## 2019-03-20 PROCEDURE — 81001 URINALYSIS AUTO W/SCOPE: CPT | Performed by: NURSE PRACTITIONER

## 2019-03-20 PROCEDURE — 87624 HPV HI-RISK TYP POOLED RSLT: CPT | Performed by: NURSE PRACTITIONER

## 2019-03-20 PROCEDURE — G0145 SCR C/V CYTO,THINLAYER,RESCR: HCPCS | Performed by: NURSE PRACTITIONER

## 2019-03-20 PROCEDURE — 99396 PREV VISIT EST AGE 40-64: CPT | Performed by: NURSE PRACTITIONER

## 2019-03-20 PROCEDURE — 87210 SMEAR WET MOUNT SALINE/INK: CPT | Performed by: NURSE PRACTITIONER

## 2019-03-20 PROCEDURE — 99213 OFFICE O/P EST LOW 20 MIN: CPT | Mod: 25 | Performed by: NURSE PRACTITIONER

## 2019-03-20 ASSESSMENT — MIFFLIN-ST. JEOR: SCORE: 1312.49

## 2019-03-20 NOTE — TELEPHONE ENCOUNTER
I can do the physical and see her for both those problems today at the 5:30 pm time.  It looks like she has a 30 minute appt so no problems. KATHERIN Santos

## 2019-03-20 NOTE — LETTER
[facility]  [address]  [place] [address]  [phone]          April 10, 2019    Wanda KELLY Willing                                                                                                                     [address]  [place] [address]            Hi Wanda,    Your PAP smear is normal.    The future development of cervical cancer is closely linked to some specific types of HPV (Human Papillomavirus). Your result showed evidence of no HPV. We recommend that you have your next PAP smear and HPV test in 3 years, unless otherwise directed by your provider. You will still need to have annual wellness exams.     The presence of endometrial cells was seen on your current pap smear.  This is most likely due to your menstrual cycle.  No follow up is recommended unless you have irregular periods or spotting/bleeding in between your cycles.  If this occurs, please contact the clinic for a follow up appointment.         Carol Bahena PA-C./  Emilee Gross RN-Pap Tracking

## 2019-03-20 NOTE — TELEPHONE ENCOUNTER
Left detailed message for pt with providers response    No furhter action needed on this TE    Antionette Slater RN   [facility]

## 2019-03-20 NOTE — PATIENT INSTRUCTIONS
AZO tabs - over-the-counter- will make your pee orange  Ibuprofen (is not aspirin) 400-600 mg  Plain, cold greek yogurt    Hot pack the cyst at least four times a day  If it is getting worse and you have a fever or malaise then be seen right away  If it getting bigger and fluctuant then you can come in for incision and drainage    Lab only for cholesterol and glucose - fasting water only 10-12 hours    [employer] - [employer] [facility]    Preventive Health Recommendations  Female Ages 40 to 49    Yearly exam:     See your health care provider every year in order to  1. Review health changes.   2. Discuss preventive care.    3. Review your medicines if your doctor prescribed any.      Get a Pap test every three years (unless you have an abnormal result and your provider advises testing more often).      If you get Pap tests with HPV test, you only need to test every 5 years, unless you have an abnormal result. You do not need a Pap test if your uterus was removed (hysterectomy) and you have not had cancer.      You should be tested each year for STDs (sexually transmitted diseases), if you're at risk.     Ask your doctor if you should have a mammogram.      Have a colonoscopy (test for colon cancer) if someone in your family has had colon cancer or polyps before age 50.       Have a cholesterol test every 5 years.       Have a diabetes test (fasting glucose) after age 45. If you are at risk for diabetes, you should have this test every 3 years.    Shots: Get a flu shot each year. Get a tetanus shot every 10 years.     Nutrition:     Eat at least 5 servings of fruits and vegetables each day.    Eat whole-grain bread, whole-wheat pasta and brown rice instead of white grains and rice.    Get adequate Calcium and Vitamin D.      Lifestyle    Exercise at least 150 minutes a week (an average of 30 minutes a day, 5 days a week). This will help you control your weight and prevent  disease.    Limit alcohol to one drink per day.    No smoking.     Wear sunscreen to prevent skin cancer.    See your dentist every six months for an exam and cleaning.

## 2019-03-21 ENCOUNTER — TELEPHONE (OUTPATIENT)
Dept: FAMILY MEDICINE | Facility: CLINIC | Age: 49
End: 2019-03-21

## 2019-03-21 DIAGNOSIS — N76.0 BV (BACTERIAL VAGINOSIS): Primary | ICD-10-CM

## 2019-03-21 DIAGNOSIS — B96.89 BV (BACTERIAL VAGINOSIS): Primary | ICD-10-CM

## 2019-03-21 RX ORDER — METRONIDAZOLE 7.5 MG/G
1 GEL VAGINAL DAILY
Qty: 70 G | Refills: 0 | Status: SHIPPED | OUTPATIENT
Start: 2019-03-21 | End: 2019-03-26

## 2019-03-21 ASSESSMENT — ASTHMA QUESTIONNAIRES: ACT_TOTALSCORE: 24

## 2019-03-21 NOTE — TELEPHONE ENCOUNTER
Called patient. Notified of provider message. Pt verbalized understanding.    Ese Matthews RN  [facility]

## 2019-03-21 NOTE — TELEPHONE ENCOUNTER
Lizet,   Patient states she would like to treat BV.    Pharmacy cued     Please advise    Thank You!  Brit Branham, [place]  Triage Nurse

## 2019-03-21 NOTE — TELEPHONE ENCOUNTER
Lizet,  Please see phone message below. Patient requesting lab results from 03/20/2019 office visit.     Thank You!  Brit Branham, [place]  Triage Nurse

## 2019-03-21 NOTE — RESULT ENCOUNTER NOTE
Wanda,    Your urine was normal (the blood is from menses).  The wet prep shows bacterial vaginosis.  Do you want to treat that?  It isn't necessary to treat as it is not an infection, but an imbalance.  If you did want to treat it I would recommend the topical vaginal antibiotic.  Let me know how you want to go forward.  If you have any questions, please feel free to contact the clinic.    KATHERIN Santos

## 2019-03-26 LAB
COPATH REPORT: NORMAL
PAP: NORMAL

## 2019-03-27 ENCOUNTER — MYC MEDICAL ADVICE (OUTPATIENT)
Dept: FAMILY MEDICINE | Facility: CLINIC | Age: 49
End: 2019-03-27

## 2019-03-27 DIAGNOSIS — R10.2 PELVIC PAIN IN FEMALE: Primary | ICD-10-CM

## 2019-03-27 LAB
FINAL DIAGNOSIS: NORMAL
HPV HR 12 DNA CVX QL NAA+PROBE: NEGATIVE
HPV16 DNA SPEC QL NAA+PROBE: NEGATIVE
HPV18 DNA SPEC QL NAA+PROBE: NEGATIVE
SPECIMEN DESCRIPTION: NORMAL
SPECIMEN SOURCE CVX/VAG CYTO: NORMAL

## 2019-03-27 NOTE — TELEPHONE ENCOUNTER
Lizet    See pt mychart message     Antionette Slater RN   [facility]

## 2019-03-27 NOTE — TELEPHONE ENCOUNTER
I'd like her to have a pelvic US.  Wrote for pelvic US and she should hear from Ina to schedule that and an OB-GYN follow-up to the US.  KATHERIN Santos

## 2019-03-28 ENCOUNTER — MYC MEDICAL ADVICE (OUTPATIENT)
Dept: FAMILY MEDICINE | Facility: CLINIC | Age: 49
End: 2019-03-28

## 2019-04-17 ENCOUNTER — TELEPHONE (OUTPATIENT)
Dept: FAMILY MEDICINE | Facility: CLINIC | Age: 49
End: 2019-04-17

## 2019-04-17 NOTE — TELEPHONE ENCOUNTER
"Could you call patient with [employer] message, please?  Maybe check that she does use (and prefer) [employer].  Thanks!  KATHERIN Santos       \"Wanda - don't forget to have the fasting labs.  If possible maybe you could plan them for the same day as your ultrasound.     Also the form asks for a was circumference - could you measure that and send me a message with it?     Thanks!   Lizet \"    "

## 2019-04-17 NOTE — TELEPHONE ENCOUNTER
Called patient. Notified her of provider message. Pt verbalized understanding. She states that she will send response with waist circumference measurement. Pt also stated that she hasn't had any more pelvic pain. She was experiencing pain for several days after not being on her birth control for 3 days. After going back on her birth control, she has not had any further pain. If she notes any change or return of pelvic pain, she will call to schedule vaginal US. Pt stated that she is also aware that she needs to come in for the labs, she will schedule this.    Ese Matthews RN  [facility]

## 2019-05-09 ENCOUNTER — MYC MEDICAL ADVICE (OUTPATIENT)
Dept: FAMILY MEDICINE | Facility: CLINIC | Age: 49
End: 2019-05-09

## 2019-05-10 DIAGNOSIS — Z13.220 LIPID SCREENING: ICD-10-CM

## 2019-05-10 DIAGNOSIS — Z13.1 SCREENING FOR DIABETES MELLITUS: ICD-10-CM

## 2019-05-10 LAB
CHOLEST SERPL-MCNC: 213 MG/DL
GLUCOSE SERPL-MCNC: 86 MG/DL (ref 70–99)
HDLC SERPL-MCNC: 66 MG/DL
LDLC SERPL CALC-MCNC: 116 MG/DL
NONHDLC SERPL-MCNC: 147 MG/DL
TRIGL SERPL-MCNC: 154 MG/DL

## 2019-05-10 PROCEDURE — 82947 ASSAY GLUCOSE BLOOD QUANT: CPT | Performed by: NURSE PRACTITIONER

## 2019-05-10 PROCEDURE — 36415 COLL VENOUS BLD VENIPUNCTURE: CPT | Performed by: NURSE PRACTITIONER

## 2019-05-10 PROCEDURE — 80061 LIPID PANEL: CPT | Performed by: NURSE PRACTITIONER

## 2019-05-10 NOTE — RESULT ENCOUNTER NOTE
Wanda,    Your labs were all normal.  Technically your total cholesterol is high and triglycerides mildly elevated, but part of the total comes from your amazing HDL (protective number).  Your cardiovascular risk is very low (see below).  I finished the form and we will fax it unless you want to pick it up.  If you have any questions, please feel free to contact the clinic.    KATHERIN Santos      The 10-year ASCVD risk score (Lornejerad CACERES Jr., et al., 2013) is: 0.9%    Values used to calculate the score:      Age: 49 years      Sex: Female      Is Non- : No      Diabetic: No      Tobacco smoker: No      Systolic Blood Pressure: 126 mmHg      Is BP treated: No      HDL Cholesterol: 66 mg/dL      Total Cholesterol: 213 mg/dL

## 2019-11-02 ENCOUNTER — HEALTH MAINTENANCE LETTER (OUTPATIENT)
Age: 49
End: 2019-11-02

## 2019-11-06 ENCOUNTER — ALLIED HEALTH/NURSE VISIT (OUTPATIENT)
Dept: NURSING | Facility: CLINIC | Age: 49
End: 2019-11-06
Payer: COMMERCIAL

## 2019-11-06 DIAGNOSIS — Z23 NEED FOR PROPHYLACTIC VACCINATION AND INOCULATION AGAINST INFLUENZA: Primary | ICD-10-CM

## 2019-11-06 PROCEDURE — 90686 IIV4 VACC NO PRSV 0.5 ML IM: CPT

## 2019-11-06 PROCEDURE — 90471 IMMUNIZATION ADMIN: CPT

## 2019-11-06 PROCEDURE — 99207 ZZC NO CHARGE NURSE ONLY: CPT

## 2019-12-26 ENCOUNTER — HOSPITAL ENCOUNTER (OUTPATIENT)
Dept: MAMMOGRAPHY | Facility: CLINIC | Age: 49
Discharge: HOME | End: 2019-12-26
Attending: NURSE PRACTITIONER | Admitting: NURSE PRACTITIONER
Payer: COMMERCIAL

## 2019-12-26 DIAGNOSIS — Z12.31 VISIT FOR SCREENING MAMMOGRAM: ICD-10-CM

## 2019-12-26 PROCEDURE — 77063 BREAST TOMOSYNTHESIS BI: CPT

## 2020-01-13 ENCOUNTER — MYC MEDICAL ADVICE (OUTPATIENT)
Dept: FAMILY MEDICINE | Facility: CLINIC | Age: 50
End: 2020-01-13

## 2020-01-13 NOTE — TELEPHONE ENCOUNTER
Wrote patient back on [employer]. Gave travel clinic information. Halley Honeycutt RN on 1/13/2020 at 11:34 AM

## 2020-02-21 ENCOUNTER — OFFICE VISIT (OUTPATIENT)
Dept: FAMILY MEDICINE | Facility: CLINIC | Age: 50
End: 2020-02-21
Payer: COMMERCIAL

## 2020-02-21 VITALS
DIASTOLIC BLOOD PRESSURE: 82 MMHG | WEIGHT: 147.3 LBS | SYSTOLIC BLOOD PRESSURE: 134 MMHG | HEIGHT: 66 IN | HEART RATE: 87 BPM | OXYGEN SATURATION: 98 % | TEMPERATURE: 97.9 F | BODY MASS INDEX: 23.67 KG/M2

## 2020-02-21 DIAGNOSIS — Z12.11 SPECIAL SCREENING FOR MALIGNANT NEOPLASMS, COLON: ICD-10-CM

## 2020-02-21 DIAGNOSIS — J18.9 WALKING PNEUMONIA: Primary | ICD-10-CM

## 2020-02-21 PROCEDURE — 99214 OFFICE O/P EST MOD 30 MIN: CPT | Performed by: NURSE PRACTITIONER

## 2020-02-21 RX ORDER — AZITHROMYCIN 250 MG/1
TABLET, FILM COATED ORAL
Qty: 6 TABLET | Refills: 0 | Status: SHIPPED | OUTPATIENT
Start: 2020-02-21 | End: 2020-05-04

## 2020-02-21 ASSESSMENT — MIFFLIN-ST. JEOR: SCORE: 1304.9

## 2020-02-21 NOTE — PROGRESS NOTES
"Subjective     Wanda Orellana is a 50 year old female who presents to clinic today for the following health issues:    HPI   Acute Illness   Acute illness concerns: cough    Productive cough worsened the past two days with green and yellow phlegm. She feels that she has phlegm deep in her chest. Denies fever. Her mother and son are sick. She received the flu shot this year.     She also notes pain in lower left back, unsure if pain was caused by coughing. Ice pack provided slight relief.     Onset: 2 weeks     Fever: no    Chills/Sweats: No    Headache (location?): no    Sinus Pressure:YES    Conjunctivitis:  no    Ear Pain: no    Rhinorrhea: YES    Congestion: YES- a little     Sore Throat: YES- wake up with a little one      Cough: YES - deep chest, a little green and yellow, seems something is getting stuck in chest      Wheeze: YES- a little     Decreased Appetite: no    Nausea: no    Vomiting: no    Diarrhea:  no    Dysuria/Freq.: no    Fatigue/Achiness: YES- also a little low back pain on the left side     Sick/Strep Exposure: YES, the whole family is sick. Son Delbert is getting ear infections and cough      Therapies Tried and outcome: tea, and cough drops. Has not tried anything due to PSVT and heart racing       Reviewed and updated as needed this visit by Provider         Review of Systems     ROS:5 point ROS including CONST, HEENT, Respiratory, CV, and GI other than that noted in the HPI,  is negative     This document serves as a record of the services and decisions personally performed and made by Carol Bahena CNP. It was created on her behalf by Tammy Perez, a trained medical scribe. The creation of this document is based on the provider's statements to the medical scribe.  Tammy Perez 3:07 PM 2/21/2020        Objective    /82   Pulse 87   Temp 97.9  F (36.6  C) (Oral)   Ht 1.676 m (5' 6\")   Wt 66.8 kg (147 lb 4.8 oz)   SpO2 98%   BMI 23.77 kg/m    Body mass index is 23.77 " kg/m .  Physical Exam   GENERAL: healthy, alert and no distress  EYES: Eyes grossly normal to inspection, PERRL and conjunctivae and sclerae normal  HENT: ear canals and TM's normal, nose and mouth without ulcers or lesions, postnasal drip and cobble stoning,   NECK: no anterior or cervical lymphadenopathy, no asymmetry, masses, or scars and thyroid normal to palpation  RESP: Rales in left lower lobes, otherwise lungs clear to auscultation, no wheezing  CV: regular rate and rhythm, normal S1 S2, no S3 or S4, no murmur, click or rub, no peripheral edema  MS: no gross musculoskeletal defects noted, no edema  NEURO:  mentation intact and speech normal  PSYCH: mentation appears normal, affect normal/bright    Diagnostic Test Results:  Labs reviewed in Epic  none        Assessment & Plan     (J18.9) Walking pneumonia  (primary encounter diagnosis)  Comment:   Plan: azithromycin 250 MG PO tablet        Start azithromycin 250 mg as written in orders.     (Z12.11) Special screening for malignant neoplasms, colon  Comment:   Plan: GASTROENTEROLOGY ADULT REF PROCEDURE ONLY         [facility] [phone]                   Patient Instructions   Start antibiotic  1.  Push fluids - drink 80 oz a day  2.  Saline nasal rinse - saline spray   3.  Humidifier - could put eucalyptus or peppermint or rosemary essential oil in the humidifier water  4.  Steamy bathroom  - breathe in the steamy air  5.  [employer] Tea Source or at [employer] in bulk  6.  Hot Toddy (non-alcoholic) - lemon, alberto, honey, hot water, cayenne ([employer] coffee bar makes a good one, also [employer] [facility])      Patient Education     What Is Pneumonia?    Pneumonia is a serious lung infection. Many cases of pneumonia are caused by bacteria or viruses. Fungi may also cause pneumonia, but this is less common.  You may also get pneumonia after another illness, such as a cold, flu, or bronchitis. Those most at risk include children, older  adults, smokers, and people with long-term (chronic) health problems or weak immune systems.  Healthy lungs    Air travels in and out of the lungs through tubes called airways.    The tubes branch into smaller passages called bronchioles. These end in tiny sacs called alveoli.    Blood vessels surrounding the alveoli take oxygen into the bloodstream. At the same time, the alveoli remove carbon dioxide (a waste gas) from the blood. The carbon dioxide is then exhaled.    When you have pneumonia    Pneumonia causes the bronchioles and the alveoli to fill with excess mucus and become inflamed.    Your body s response may be to cough. This can help clear out the fluid.    The fluid (mucus) you cough up may appear green or dark yellow.    The excess mucus may make you feel short of breath.    The inflammation and infection may give you a fever.    What are the symptoms?  Symptoms of pneumonia can come without warning. At first, you may think you have a cold or flu. But symptoms may get worse quickly, turning into pneumonia. Symptoms can be different for bacterial and viral pneumonia. Common symptoms may include:    Severe cough with green or yellow mucus that doesn't get better, or gets worse    Fever and chills    Nausea, vomiting or diarrhea    Shortness of breath with normal daily activities    Increased heart rate    Chest pain or discomfort when breathing in or coughing    Headache    A lot of sweating and clammy skin  Date Last Reviewed: 4/1/2019 2000-2019 The [employer]. [address] [place] [address]. All rights reserved. This information is not intended as a substitute for professional medical care. Always follow your healthcare professional's instructions.               Return in about 1 month (around 3/21/2020) for Physical Exam; asthma.    The information in this document, created by the medical scribe, Tammy Perez, for me, accurately reflects the services I personally performed  and the decisions made by me. I have reviewed and approved this document for accuracy prior to leaving the patient care area.    MARY JO Willams [facility]

## 2020-02-21 NOTE — PATIENT INSTRUCTIONS
Start antibiotic  1.  Push fluids - drink 80 oz a day  2.  Saline nasal rinse - saline spray   3.  Humidifier - could put eucalyptus or peppermint or rosemary essential oil in the humidifier water  4.  Steamy bathroom  - breathe in the steamy air  5.  [employer] Tea [employer] or at [facility] in bulk  6.  Hot Toddy (non-alcoholic) - lemon, alberto, honey, hot water, cayenne ([facility]- coffee bar makes a good one, also [employer] in [facility])      Patient Education     What Is Pneumonia?    Pneumonia is a serious lung infection. Many cases of pneumonia are caused by bacteria or viruses. Fungi may also cause pneumonia, but this is less common.  You may also get pneumonia after another illness, such as a cold, flu, or bronchitis. Those most at risk include children, older adults, smokers, and people with long-term (chronic) health problems or weak immune systems.  Healthy lungs    Air travels in and out of the lungs through tubes called airways.    The tubes branch into smaller passages called bronchioles. These end in tiny sacs called alveoli.    Blood vessels surrounding the alveoli take oxygen into the bloodstream. At the same time, the alveoli remove carbon dioxide (a waste gas) from the blood. The carbon dioxide is then exhaled.    When you have pneumonia    Pneumonia causes the bronchioles and the alveoli to fill with excess mucus and become inflamed.    Your body s response may be to cough. This can help clear out the fluid.    The fluid (mucus) you cough up may appear green or dark yellow.    The excess mucus may make you feel short of breath.    The inflammation and infection may give you a fever.    What are the symptoms?  Symptoms of pneumonia can come without warning. At first, you may think you have a cold or flu. But symptoms may get worse quickly, turning into pneumonia. Symptoms can be different for bacterial and viral pneumonia. Common symptoms may include:    Severe cough with green or yellow  mucus that doesn't get better, or gets worse    Fever and chills    Nausea, vomiting or diarrhea    Shortness of breath with normal daily activities    Increased heart rate    Chest pain or discomfort when breathing in or coughing    Headache    A lot of sweating and clammy skin  Date Last Reviewed: 4/1/2019 2000-2019 [employer]. [address] [place] [address]. All rights reserved. This information is not intended as a substitute for professional medical care. Always follow your healthcare professional's instructions.

## 2020-02-24 ENCOUNTER — TELEPHONE (OUTPATIENT)
Dept: FAMILY MEDICINE | Facility: CLINIC | Age: 50
End: 2020-02-24

## 2020-02-24 DIAGNOSIS — J45.20 MILD INTERMITTENT ASTHMA: ICD-10-CM

## 2020-02-24 RX ORDER — ALBUTEROL SULFATE 90 UG/1
2 AEROSOL, METERED RESPIRATORY (INHALATION) EVERY 6 HOURS PRN
Qty: 1 INHALER | Refills: 0 | Status: SHIPPED | OUTPATIENT
Start: 2020-02-24 | End: 2020-03-17

## 2020-02-24 NOTE — TELEPHONE ENCOUNTER
Cough continues, has some SOB, productive  Chest feels slight tightness,     Her inhaler script is old and is requesting refill of her albuterol     Advised fluids, humidify, rest    Antionette Slater RN   [facility]

## 2020-02-24 NOTE — TELEPHONE ENCOUNTER
Reason for call:  Other   Patient called regarding (reason for call): call back  Additional comments:Patient called and stated that she is not feeling any better since taking the medicine Azithromycine 250 mg. Patient would like a call back.    Phone number to reach patient:  Cell number on file:    Telephone Information:   Mobile [phone]       Best Time:  na    Can we leave a detailed message on this number?  YES    Travel screening: Not Applicable

## 2020-03-04 ENCOUNTER — HOSPITAL ENCOUNTER (OUTPATIENT)
Dept: GENERAL RADIOLOGY | Facility: CLINIC | Age: 50
Discharge: HOME | End: 2020-03-04
Attending: NURSE PRACTITIONER | Admitting: NURSE PRACTITIONER
Payer: COMMERCIAL

## 2020-03-04 DIAGNOSIS — Z87.01 HISTORY OF PNEUMONIA: ICD-10-CM

## 2020-03-04 DIAGNOSIS — Z87.01 HISTORY OF PNEUMONIA: Primary | ICD-10-CM

## 2020-03-04 PROCEDURE — 71046 X-RAY EXAM CHEST 2 VIEWS: CPT

## 2020-03-05 ENCOUNTER — MYC MEDICAL ADVICE (OUTPATIENT)
Dept: FAMILY MEDICINE | Facility: CLINIC | Age: 50
End: 2020-03-05

## 2020-03-05 NOTE — RESULT ENCOUNTER NOTE
Wanda,    The chest xray didn't show pneumonia.  I suspect the pain is muscular. If you have any questions, please feel free to contact the clinic.    KATHERIN Santos

## 2020-03-05 NOTE — PROGRESS NOTES
Patient here at son's appt and reporting continued pain left side lower back.  Had improved initially with antibiotics for pneumonia and then worsened.  Azithromycin started 2/21/2020.  CXR ordered.  .ksm

## 2020-03-12 ENCOUNTER — MYC REFILL (OUTPATIENT)
Dept: FAMILY MEDICINE | Facility: CLINIC | Age: 50
End: 2020-03-12

## 2020-03-12 DIAGNOSIS — Z30.41 ENCOUNTER FOR SURVEILLANCE OF CONTRACEPTIVE PILLS: ICD-10-CM

## 2020-03-17 DIAGNOSIS — J45.20 MILD INTERMITTENT ASTHMA WITHOUT COMPLICATION: Primary | ICD-10-CM

## 2020-03-17 RX ORDER — ALBUTEROL SULFATE 90 UG/1
2 AEROSOL, METERED RESPIRATORY (INHALATION) EVERY 6 HOURS PRN
Qty: 1 INHALER | Refills: 0 | Status: SHIPPED | OUTPATIENT
Start: 2020-03-17 | End: 2022-07-05

## 2020-03-25 ENCOUNTER — MYC MEDICAL ADVICE (OUTPATIENT)
Dept: FAMILY MEDICINE | Facility: CLINIC | Age: 50
End: 2020-03-25

## 2020-03-31 NOTE — TELEPHONE ENCOUNTER
Lizet    See pt MyChart message    Antionette Slater, RN   [facility]

## 2020-05-01 ENCOUNTER — MYC MEDICAL ADVICE (OUTPATIENT)
Dept: FAMILY MEDICINE | Facility: CLINIC | Age: 50
End: 2020-05-01

## 2020-05-01 DIAGNOSIS — J30.9 ALLERGIC RHINITIS, UNSPECIFIED SEASONALITY, UNSPECIFIED TRIGGER: Primary | ICD-10-CM

## 2020-05-01 RX ORDER — FLUTICASONE PROPIONATE 50 MCG
1-2 SPRAY, SUSPENSION (ML) NASAL DAILY
Qty: 16 G | Refills: 1 | Status: SHIPPED | OUTPATIENT
Start: 2020-05-01 | End: 2020-05-20

## 2020-05-01 NOTE — TELEPHONE ENCOUNTER
Spoke with Pt. Reviewed mychart symptoms with Pt. She would like to wait for a visit with Lizet on Monday. Scheduled for 4pm on 5/4.     Reviewed home care advice with Pt.   Do not suppress cough. If cough is persistent and irritating can drink warm fluids to calm airway.   Drink plenty of fluids to thin secretions.   Use daily oral antihistamine as she has. Can do hot showers, nasal washes then daily flonase.   Avoid allergens that may exacerbate symptoms like fire, smoke, irritants in the air.   If any new or worsening symptoms she can call back.     Patient verbalized understanding and agree's with plan.     Jatin Mistry RN   [facility]

## 2020-05-04 ENCOUNTER — VIRTUAL VISIT (OUTPATIENT)
Dept: FAMILY MEDICINE | Facility: CLINIC | Age: 50
End: 2020-05-04
Payer: COMMERCIAL

## 2020-05-04 DIAGNOSIS — R05.8 RECURRENT PRODUCTIVE COUGH: ICD-10-CM

## 2020-05-04 DIAGNOSIS — J30.89 SEASONAL ALLERGIC RHINITIS DUE TO OTHER ALLERGIC TRIGGER: Primary | ICD-10-CM

## 2020-05-04 DIAGNOSIS — J45.20 MILD INTERMITTENT ASTHMA WITHOUT COMPLICATION: ICD-10-CM

## 2020-05-04 PROCEDURE — 99214 OFFICE O/P EST MOD 30 MIN: CPT | Mod: 95 | Performed by: NURSE PRACTITIONER

## 2020-05-04 RX ORDER — BUDESONIDE AND FORMOTEROL FUMARATE DIHYDRATE 80; 4.5 UG/1; UG/1
2 AEROSOL RESPIRATORY (INHALATION) 2 TIMES DAILY
Qty: 10.2 G | Refills: 3 | Status: SHIPPED | OUTPATIENT
Start: 2020-05-04 | End: 2020-05-20

## 2020-05-04 RX ORDER — AZELASTINE 1 MG/ML
1 SPRAY, METERED NASAL 2 TIMES DAILY
Qty: 30 ML | Refills: 1 | Status: SHIPPED | OUTPATIENT
Start: 2020-05-04 | End: 2020-05-20

## 2020-05-04 NOTE — PROGRESS NOTES
"Wanda Orellana is a 50 year old female who is being evaluated via a billable video visit.      The patient has been notified of following:     \"This video visit will be conducted via a call between you and your physician/provider. We have found that certain health care needs can be provided without the need for an in-person physical exam.  This service lets us provide the care you need with a video conversation.  If a prescription is necessary we can send it directly to your pharmacy.  If lab work is needed we can place an order for that and you can then stop by our lab to have the test done at a later time.    Video visits are billed at different rates depending on your insurance coverage.  Please reach out to your insurance provider with any questions.    If during the course of the call the physician/provider feels a video visit is not appropriate, you will not be charged for this service.\"    Patient has given verbal consent for Video visit? Yes    How would you like to obtain your AVS? Orly    Patient would like the video invitation sent by: Text to cell phone: [phone]    Will anyone else be joining your video visit? No       Milli Dawkins MA        Subjective     Wanda Orellana is a 50 year old female who presents to clinic today for the following health issues:    HPI  Acute Illness   Acute illness concerns: return of pneumonia  Onset: end of last     Fever: no    Chills/Sweats: no    Headache (location?): no    Sinus Pressure:no    Conjunctivitis: itchy eyes    Ear Pain: right side mild    Rhinorrhea: YES- and sneezing    Congestion: no    Sore Throat: YES- last week, mostly in the morning    Lymph feels swollen     Cough: YES-productive of green sputum    Wheeze: no    Decreased Appetite: no    Nausea: no    Vomiting: no    Diarrhea:  no    Dysuria/Freq.: no    Fatigue/Achiness: YES    Sick/Strep Exposure: YES- unsure - Delbert is still coughing and wheezing (he is taking the allergy medication, " "but not daily)     Therapies Tried and outcome: generic zyrtec, has order for flonase, but not picked up    Felt like she was starting to get sick again  Green stuff coming up with coughing - not \"crazy coughing\"  Thinks it is due to allergies, but concerned about return of pneumonia  Used inhaler a couple times in the last week for SOB.  Pushed water and respiratory tea, vitamin C packet    Did have period of complete resolution for at least a month    Mom has surgery this week    Has had increased PSVT episodes over the past month    Video Start Time: 4:14 PM    I have reviewed and updated the patient's Past Medical History, Social History, Family History and Medication List    Reviewed and updated as needed this visit by Provider         Review of Systems   ROS COMP:   ROS:5 point ROS including CONST, HEENT, Respiratory, CV, and GI other than that noted in the HPI,  is negative         Objective    There were no vitals taken for this visit.  Estimated body mass index is 23.77 kg/m  as calculated from the following:    Height as of 2/21/20: 1.676 m (5' 6\").    Weight as of 2/21/20: 66.8 kg (147 lb 4.8 oz).  Physical Exam     GENERAL: healthy, alert and no distress  EYES: Eyes grossly normal to inspection, conjunctivae and sclerae normal  RESP: no audible wheeze, cough, or visible cyanosis.  No visible retractions or increased work of breathing.  Able to speak fully in complete sentences.  NEURO: Cranial nerves grossly intact, mentation intact and speech normal  PSYCH: mentation appears normal, affect normal/bright, judgement and insight intact, normal speech and appearance well-groomed      Diagnostic Test Results:  Labs reviewed in Epic  none         Assessment & Plan     (J30.89) Seasonal allergic rhinitis due to other allergic trigger  (primary encounter diagnosis)  Comment:   Plan: Add flonase and astelin spray as well as afrin for three days    (R05) Recurrent productive cough  Comment:   Plan: Does not sound " "like pneumonia at this time.       (J45.20) Mild intermittent asthma without complication  Comment:   Plan: budesonide-formoterol (SYMBICORT) 80-4.5         MCG/ACT Inhaler        Per LORRI guidelines        Patient Instructions   FOR TERI - give zyrtec daily and for the next two weeks give pulmicort daily and then stop - make an appt in a month    FOR YOU - Take Afrin for the next three days  Also add the nasal steroid fluticasone.  Two sprays each nostril per day. Take for a month past when you have complete resolution.  I usually find that adding a steroid nasal spray helps a lot with all symptoms.  One trick with using these is to only insert the nozzle slightly and then tip to point toward your eye rather than straight back.  Do not take a deep breath with administration.  These measures keep it from going back into your throat where it doesn't work, can irritate your throat and tastes awful.     Consider switching antihistamine.  You can take any anti-histamine as long a it doesn't have \"D\" after it.  Look for loratidine, cetirizine or fenofexadine - doesn't matter which.      If you get fever, chills, body aches, headache.      Return in about 4 weeks (around 6/1/2020) for cough, Asthma Recheck and ACT.    MARY JO Willams CNP  [facility]      Video-Visit Details    Type of service:  Video Visit    Video End Time:4:46 PM    Originating Location (pt. Location): Home    [place]  [facility]     Platform used for Video Visit: Thai    Return in about 4 weeks (around 6/1/2020) for cough, Asthma Recheck and ACT.       MARY JO Willams CNP        "

## 2020-05-04 NOTE — PATIENT INSTRUCTIONS
"FOR TERI - give zyrtec daily and for the next two weeks give pulmicort daily and then stop - make an appt in a month    FOR YOU - Take Afrin for the next three days  Also add the nasal steroid fluticasone.  Two sprays each nostril per day. Take for a month past when you have complete resolution.  I usually find that adding a steroid nasal spray helps a lot with all symptoms.  One trick with using these is to only insert the nozzle slightly and then tip to point toward your eye rather than straight back.  Do not take a deep breath with administration.  These measures keep it from going back into your throat where it doesn't work, can irritate your throat and tastes awful.     Consider switching antihistamine.  You can take any anti-histamine as long a it doesn't have \"D\" after it.  Look for loratidine, cetirizine or fenofexadine - doesn't matter which.      If you get fever, chills, body aches, headache.  "

## 2020-05-20 DIAGNOSIS — J30.9 ALLERGIC RHINITIS, UNSPECIFIED SEASONALITY, UNSPECIFIED TRIGGER: ICD-10-CM

## 2020-05-20 DIAGNOSIS — J45.20 MILD INTERMITTENT ASTHMA WITHOUT COMPLICATION: ICD-10-CM

## 2020-05-20 DIAGNOSIS — J30.89 SEASONAL ALLERGIC RHINITIS DUE TO OTHER ALLERGIC TRIGGER: ICD-10-CM

## 2020-05-20 RX ORDER — AZELASTINE 1 MG/ML
1 SPRAY, METERED NASAL 2 TIMES DAILY
Qty: 3 BOTTLE | Refills: 1 | Status: SHIPPED | OUTPATIENT
Start: 2020-05-20 | End: 2022-07-05

## 2020-05-20 RX ORDER — BUDESONIDE AND FORMOTEROL FUMARATE DIHYDRATE 80; 4.5 UG/1; UG/1
2 AEROSOL RESPIRATORY (INHALATION) 2 TIMES DAILY
Qty: 3 INHALER | Refills: 3 | Status: SHIPPED | OUTPATIENT
Start: 2020-05-20 | End: 2022-07-05

## 2020-05-20 RX ORDER — FLUTICASONE PROPIONATE 50 MCG
1-2 SPRAY, SUSPENSION (ML) NASAL DAILY
Qty: 48 G | Refills: 1 | Status: SHIPPED | OUTPATIENT
Start: 2020-05-20 | End: 2023-01-24

## 2020-05-20 NOTE — TELEPHONE ENCOUNTER
Request for 90 day supply sent to pharmacy.    Savanna Metzger RN   [facility]

## 2020-09-28 ENCOUNTER — OFFICE VISIT (OUTPATIENT)
Dept: FAMILY MEDICINE | Facility: CLINIC | Age: 50
End: 2020-09-28
Payer: COMMERCIAL

## 2020-09-28 VITALS
BODY MASS INDEX: 22.92 KG/M2 | WEIGHT: 142 LBS | OXYGEN SATURATION: 95 % | SYSTOLIC BLOOD PRESSURE: 120 MMHG | DIASTOLIC BLOOD PRESSURE: 80 MMHG | HEART RATE: 90 BPM | TEMPERATURE: 98.4 F

## 2020-09-28 DIAGNOSIS — R51.9 NONINTRACTABLE EPISODIC HEADACHE, UNSPECIFIED HEADACHE TYPE: ICD-10-CM

## 2020-09-28 DIAGNOSIS — F43.29 STRESS AND ADJUSTMENT REACTION: ICD-10-CM

## 2020-09-28 DIAGNOSIS — Z23 NEED FOR PROPHYLACTIC VACCINATION AND INOCULATION AGAINST INFLUENZA: ICD-10-CM

## 2020-09-28 DIAGNOSIS — R20.2 PARESTHESIAS: Primary | ICD-10-CM

## 2020-09-28 DIAGNOSIS — R53.83 FATIGUE, UNSPECIFIED TYPE: ICD-10-CM

## 2020-09-28 PROCEDURE — 90682 RIV4 VACC RECOMBINANT DNA IM: CPT | Performed by: NURSE PRACTITIONER

## 2020-09-28 PROCEDURE — 90471 IMMUNIZATION ADMIN: CPT | Performed by: NURSE PRACTITIONER

## 2020-09-28 PROCEDURE — 99214 OFFICE O/P EST MOD 30 MIN: CPT | Mod: 25 | Performed by: NURSE PRACTITIONER

## 2020-09-28 ASSESSMENT — PAIN SCALES - GENERAL: PAINLEVEL: NO PAIN (0)

## 2020-09-28 NOTE — PROGRESS NOTES
Subjective     Wanda Orellana is a 50 year old female who presents to clinic today for the following health issues:    HPI       Concern - Facial Tingling   Onset: 3 days   Description:  right side of face tingly 3x days  Intensity: moderate  Progression of Symptoms:  Worse   Accompanying Signs & Symptoms: Radiating down over eye down to right side of  head into tongue  Into  chin and ear   Previous history of similar problem: No   Precipitating factors:        Worsened by: None Identified   Alleviating factors:        Improved by: None Identified   Therapies tried and outcome: Tylenol, Claritin Generic outcome not effective    Started Thurs when woke up, came back the other night   Was feeling panicky when it came back and then worsened to lips and things  Stressful dealing with mom's things   Had eye exam was normal    Normal neuro exam   Crying  no stroke symptoms     Ongoing fatigue  Lab person gone        Review of Systems   Constitutional, HEENT, cardiovascular, pulmonary, GI, , musculoskeletal, neuro, skin, endocrine and psych systems are negative, except as otherwise noted.      Objective    /80   Pulse 90   Temp 98.4  F (36.9  C) (Oral)   Wt 64.4 kg (142 lb)   SpO2 95%   BMI 22.92 kg/m    Body mass index is 22.92 kg/m .  Physical Exam   GENERAL: healthy, alert and no distress  EYES: Eyes grossly normal to inspection, PERRL and conjunctivae and sclerae normal  HENT: ear canals and TM's normal, nose and mouth without ulcers or lesions  NECK: no adenopathy, no asymmetry, masses, or scars and thyroid normal to palpation  RESP: lungs clear to auscultation - no rales, rhonchi or wheezes  CV: regular rate and rhythm, normal S1 S2, no S3 or S4, no murmur, click or rub, no peripheral edema and peripheral pulses strong  ABDOMEN: soft, nontender, no hepatosplenomegaly, no masses and bowel sounds normal  MS: no gross musculoskeletal defects noted, no edema  SKIN: no suspicious lesions or rashes  NEURO:  Normal strength and tone, sensory exam grossly normal, mentation intact, speech normal, cranial nerves 2-12 intact, gait normal including heel/toe/tandem walking and rapid alternating movements normal  PSYCH: mentation appears normal, tearful, anxious, judgement and insight intact and appearance well groomed    No results found for this or any previous visit (from the past 24 hour(s)).        Assessment & Plan       ICD-10-CM    1. Need for prophylactic vaccination and inoculation against influenza  Z23 INFLUENZA QUAD, RECOMBINANT, P-FREE (RIV4) (FLUBLOCK) [45285]     Vaccine Administration, Initial [68745]   2. Paresthesias  R20.2 Ferritin     CBC with platelets differential     TSH with free T4 reflex     Comprehensive metabolic panel     CRP inflammation     Vitamin D Deficiency     Lipid panel reflex to direct LDL Fasting   3. Nonintractable episodic headache, unspecified headache type  R51 CRP inflammation   4. Fatigue, unspecified type  R53.83    5. Stress and adjustment reaction  F43.29     differentials include bells palsy, paresthesias due to anemia or other nutritional deficit, stress or anxiety, hyperventilation, will rule out GCA as well as thyroid see above and patient instructions   Keep HA journal of symptoms  Discussed when to go to ER  Work on stress and sleep, good self care, relaxation     Monitor for now, reviewed stroke symptoms red flags when to go to ER if has any      [place] [facility] [place]     See Patient Instructions    No follow-ups on file.    MARY JO Murguia [facility]

## 2020-09-28 NOTE — PATIENT INSTRUCTIONS
"  Patient Education     Self-Care for Headaches    Most headaches aren't serious and can be relieved with self-care. But some headaches may be a sign of another health problem like eye trouble or high blood pressure. To find the best treatment, learn what kind of headaches you get. For tension headaches, self-care will usually help. To treat migraines, ask your healthcare provider for advice. It is also possible to get both tension and migraine headaches. Self-care involves relieving the pain and avoiding headache  triggers  if you can.  Ways to reduce pain and tension  Try these steps:    Apply a cold compress or ice pack to the pain site.    Drink fluids. If nausea makes it hard to drink, try sucking on ice.    Rest. Protect yourself from bright light and loud noises.    Calm your emotions by imagining a peaceful scene.    Massage tight neck, shoulder, and head muscles.    To relax muscles, soak in a hot bath or use a hot shower.  Use medicines  Aspirin or other over-the-counter pain medicines, such as ibuprofen and acetaminophen, can relieve headache. Remember: Never give aspirin to anyone 18 years old or younger because of the risk of developing Reye syndrome. Use pain medicines only when needed. Certain prescription medicines, if taken too often, can lead to rebound headaches. Check with your healthcare provider or pharmacist about your medicines.  Track your headaches  Keeping a headache diary can help you and your healthcare provider identify what's causing your headaches:    Note when each headache happens.    Identify your activities and the foods you've eaten 6 to 8 hours before the headache began.    Look for any trends or \"triggers.\"  Signs of tension headache  Any of the following can be signs:    Dull pain or feeling of pressure in a tight band around your head    Pain in your neck or shoulders    Headache without a definite beginning or end    Headache after an activity such as driving or working on a " "computer  Signs of migraine  Any of the following can be signs:    Throbbing pain on one or both sides of your head    Nausea or vomiting    Extreme sensitivity to light, sound, and smells    Bright spots, flashes, or other visual changes    Pain or nausea so severe that you can't continue your daily activities  Call your healthcare provider   If you have any of the following symptoms, contact your healthcare provider:    A headache that lingers after a recent injury or bump to the head.    A fever with a stiff neck or pain when you bend your head toward your chest.    A headache along with slurred speech, changes in your vision, or numbness or weakness in your arms or legs.    A headache for longer than 3 days.    Frequent headaches, especially in the morning.    Headaches with seizures     Seek immediate medical attention if you have a headache that you would call \"the worst headache you have ever had.\"  Date Last Reviewed: 3/1/2018    9700-7275 [employer]. [address] [place] [address]. All rights reserved. This information is not intended as a substitute for professional medical care. Always follow your healthcare professional's instructions.           Patient Education     Paraesthesias  Paraesthesia is a burning or prickling sensation that is sometimes felt in the hands, arms, legs or feet. It can also occur in other parts of the body. It can also feel like tingling or numbness, skin crawling, or itching. The feeling is not comfortable, but it is not painful. (The \"pins and needles\" feeling that happens when a foot or hand \"falls asleep\" is a temporary paraesthesia.)  Paraesthesias that last or come and go may be caused by medical issues that need to be treated. These include stroke, a bulging disk pressing on a nerve, a trapped nerve, vitamin deficiencies, uncontrolled diabetes, alcohol abuse, or even certain medicines.  Tests are often done. These tests may include blood tests, " X-ray, CT (computerized tomography) scan, nerve conduction studies (NCS), or a muscle test (electromyography). Depending on the cause, treatment may include physical therapy.  Home care    Tell your healthcare provider about all medicines you take. This includes prescription and over-the-counter medicines, vitamins, and herbs. Ask if any of the medicines may be causing your problems. Don't make any changes to prescription medicines without talking to your healthcare provider first.    You may be prescribed medicines to help relieve the tingling feeling or for pain. Take all medicines as directed.    A numb hand or foot may be more prone to injury. To help protect it:  ? Always use oven mitts.  ? Test water with an unaffected hand or foot.  ? Use caution when trimming nails. File sharp areas.  ? Wear shoes that fit well to avoid pressure points, blisters, and ulcers.  ? Inspect your hands and feet carefully (including the soles of your feet and between your toes) daily. If you see red areas, sores, or other problems, tell your healthcare provider.  Follow-up care  Follow up with your doctor, or as advised. You may need further testing or evaluation.     When to seek medical advice  Call your healthcare provider right away if any of the following occur:    Numbness or weakness of the face, one arm, or one leg    Slurred speech, confusion, trouble speaking, walking, or seeing    Severe headache, fainting spell, dizziness, or seizure    Chest, arm, neck, or upper back pain    Loss of bladder or bowel control    Open wound with redness, swelling, or pus     Date Last Reviewed: 4/1/2018 2000-2019 [employer]. [address] [place] [id number]. All rights reserved. This information is not intended as a substitute for professional medical care. Always follow your healthcare professional's instructions.           Patient Education     Farnsworth s Palsy    Bell's Palsy is a problem involving the nerve that  controls the muscles on one side of the face.  In most cases, the cause is unknown, but may be related to inflammation of the nerve, diabetes, pregnancy, Lyme disease, and viral infections such as herpes or varicella. Symptoms usually appear only on one side. They may include:    Inability to close the eyelid    Tearing of the eye    Facial drooping    Drooling    Numbness or pain    Changes in taste    Sound sensitivity  Damage to the eye can be a serious problem. The inability to blink can cause the eye to dry out. An ulcer (sore) can then form on the cornea. Also, not blinking means that the eye has no protection from dirt and dust particles.  Treatment involves protecting and moistening the eye. Medicines, such as steroids, may also help.  Most people recover fully within 3 to 6 months. However, the condition sometimes returns months or years later.  Home care    Get plenty of rest and eat a healthy diet to help yourself recover.    Use artificial tears often during the day and at bedtime to prevent drying. These drops are available without prescription at your drug store.    Wear protective glasses especially when outside to protect from flying debris. Use sunglasses when outdoors.    Tape the eyelid closed at bedtime with a paper tape (available at your pharmacy). It has a very mild adhesive so won't injure the lid. This will protect your eye from injury while you sleep.    Sometimes medicines are prescribed to reduce inflammation or treat specific viral infections of the nerve. If medicines are prescribed, take them exactly as directed. Usually the sooner the medicines are started, the more effective they are. Taking this medicine as prescribed will help with a full recovery.    Use low heat, for example from a heating pad, on the affected area. This can help reduce pain and swelling.    If you have severe pain, contact your healthcare provider.  Follow-up care  Follow up with your healthcare provider as  advised. If you referred to a specialist, make that appointment promptly.  When to seek medical advice  Call your healthcare provider if any of the following occur:    Severe eye redness    Eye pain    Thick drainage from the eye    Change in vision (such as double vision or losing vision)    Fever over 100.4 F (38 C) or as directed by your healthcare provider    Headache, neck pain, weakness, trouble speaking or walking, or other unexplained symptoms  Date Last Reviewed: 3/1/2018    8440-4632 [employer]. [address] [place] [address]. All rights reserved. This information is not intended as a substitute for professional medical care. Always follow your healthcare professional's instructions.

## 2020-09-30 DIAGNOSIS — R20.2 PARESTHESIAS: ICD-10-CM

## 2020-09-30 DIAGNOSIS — R51.9 NONINTRACTABLE EPISODIC HEADACHE, UNSPECIFIED HEADACHE TYPE: ICD-10-CM

## 2020-09-30 LAB
ALBUMIN SERPL-MCNC: 3.7 G/DL (ref 3.4–5)
ALP SERPL-CCNC: 39 U/L (ref 40–150)
ALT SERPL W P-5'-P-CCNC: 29 U/L (ref 0–50)
ANION GAP SERPL CALCULATED.3IONS-SCNC: 6 MMOL/L (ref 3–14)
AST SERPL W P-5'-P-CCNC: 23 U/L (ref 0–45)
BASOPHILS # BLD AUTO: 0.1 10E9/L (ref 0–0.2)
BASOPHILS NFR BLD AUTO: 1.1 %
BILIRUB SERPL-MCNC: 0.5 MG/DL (ref 0.2–1.3)
BUN SERPL-MCNC: 9 MG/DL (ref 7–30)
CALCIUM SERPL-MCNC: 9.2 MG/DL (ref 8.5–10.1)
CHLORIDE SERPL-SCNC: 108 MMOL/L (ref 94–109)
CHOLEST SERPL-MCNC: 199 MG/DL
CO2 SERPL-SCNC: 25 MMOL/L (ref 20–32)
CREAT SERPL-MCNC: 0.78 MG/DL (ref 0.52–1.04)
CRP SERPL-MCNC: 5.5 MG/L (ref 0–8)
DEPRECATED CALCIDIOL+CALCIFEROL SERPL-MC: 83 UG/L (ref 20–75)
DIFFERENTIAL METHOD BLD: NORMAL
EOSINOPHIL # BLD AUTO: 0.4 10E9/L (ref 0–0.7)
EOSINOPHIL NFR BLD AUTO: 7.9 %
ERYTHROCYTE [DISTWIDTH] IN BLOOD BY AUTOMATED COUNT: 11.9 % (ref 10–15)
FERRITIN SERPL-MCNC: 132 NG/ML (ref 8–252)
GFR SERPL CREATININE-BSD FRML MDRD: 88 ML/MIN/{1.73_M2}
GLUCOSE SERPL-MCNC: 83 MG/DL (ref 70–99)
HCT VFR BLD AUTO: 42.8 % (ref 35–47)
HDLC SERPL-MCNC: 63 MG/DL
HGB BLD-MCNC: 14 G/DL (ref 11.7–15.7)
IMM GRANULOCYTES # BLD: 0 10E9/L (ref 0–0.4)
IMM GRANULOCYTES NFR BLD: 0.2 %
LDLC SERPL CALC-MCNC: 112 MG/DL
LYMPHOCYTES # BLD AUTO: 2 10E9/L (ref 0.8–5.3)
LYMPHOCYTES NFR BLD AUTO: 38.7 %
MCH RBC QN AUTO: 30.5 PG (ref 26.5–33)
MCHC RBC AUTO-ENTMCNC: 32.7 G/DL (ref 31.5–36.5)
MCV RBC AUTO: 93 FL (ref 78–100)
MONOCYTES # BLD AUTO: 0.4 10E9/L (ref 0–1.3)
MONOCYTES NFR BLD AUTO: 7.3 %
NEUTROPHILS # BLD AUTO: 2.3 10E9/L (ref 1.6–8.3)
NEUTROPHILS NFR BLD AUTO: 44.8 %
NONHDLC SERPL-MCNC: 136 MG/DL
NRBC # BLD AUTO: 0 10*3/UL
NRBC BLD AUTO-RTO: 0 /100
PLATELET # BLD AUTO: 256 10E9/L (ref 150–450)
POTASSIUM SERPL-SCNC: 4.4 MMOL/L (ref 3.4–5.3)
PROT SERPL-MCNC: 7.9 G/DL (ref 6.8–8.8)
RBC # BLD AUTO: 4.59 10E12/L (ref 3.8–5.2)
SODIUM SERPL-SCNC: 139 MMOL/L (ref 133–144)
TRIGL SERPL-MCNC: 121 MG/DL
TSH SERPL DL<=0.005 MIU/L-ACNC: 2.46 MU/L (ref 0.4–4)
WBC # BLD AUTO: 5.2 10E9/L (ref 4–11)

## 2020-10-01 ENCOUNTER — MYC MEDICAL ADVICE (OUTPATIENT)
Dept: FAMILY MEDICINE | Facility: CLINIC | Age: 50
End: 2020-10-01

## 2020-10-01 NOTE — TELEPHONE ENCOUNTER
Pt sent [employer] asking about labs from yesterday.       Jatin Mistry RN   [facility]

## 2020-10-06 NOTE — TELEPHONE ENCOUNTER
Amee,    Please see patients my chart reply.    Thanks  Savanna Metzger RN   [facility]

## 2020-11-16 ENCOUNTER — HEALTH MAINTENANCE LETTER (OUTPATIENT)
Age: 50
End: 2020-11-16

## 2020-12-19 DIAGNOSIS — Z30.41 ENCOUNTER FOR SURVEILLANCE OF CONTRACEPTIVE PILLS: ICD-10-CM

## 2020-12-20 RX ORDER — NORETHINDRONE AND ETHINYL ESTRADIOL 1 MG-35MCG
KIT ORAL
Qty: 84 TABLET | Refills: 2 | Status: SHIPPED | OUTPATIENT
Start: 2020-12-20 | End: 2021-08-30

## 2020-12-20 NOTE — TELEPHONE ENCOUNTER
"Requested Prescriptions   Pending Prescriptions Disp Refills     ALAYCEN 1/35 1-35 MG-MCG tablet [Pharmacy Med Name: ALYACEN 1-35 28 TABLET] 84 tablet 1     Sig: TAKE 1 TABLET BY MOUTH EVERY DAY       Contraceptives Protocol Passed - 12/19/2020 10:26 AM        Passed - Patient is not a current smoker if age is 35 or older        Passed - Recent (12 mo) or future (30 days) visit within the authorizing provider's specialty     Patient has had an office visit with the authorizing provider or a provider within the authorizing providers department within the previous 12 mos or has a future within next 30 days. See \"Patient Info\" tab in inbasket, or \"Choose Columns\" in Meds & Orders section of the refill encounter.              Passed - Medication is active on med list        Passed - No active pregnancy on record        Passed - No positive pregnancy test in past 12 months       Hormone Replacement Therapy Passed - 12/19/2020 10:26 AM        Passed - Blood pressure under 140/90 in past 12 months     BP Readings from Last 3 Encounters:   09/28/20 120/80   02/21/20 134/82   03/20/19 126/78                 Passed - Recent (12 mo) or future (30 days) visit within the authorizing provider's specialty     Patient has had an office visit with the authorizing provider or a provider within the authorizing providers department within the previous 12 mos or has a future within next 30 days. See \"Patient Info\" tab in inbasket, or \"Choose Columns\" in Meds & Orders section of the refill encounter.              Passed - Patient has mammogram in past 2 years on file if age 50-75        Passed - Medication is active on med list        Passed - Patient is 18 years of age or older        Passed - No active pregnancy on record        Passed - No positive pregnancy test on record in past 12 months           Signed Prescriptions:                        Disp   Refills    ALAYCEN 1/35 1-35 MG-MCG tablet            84 tab*2        Sig: TAKE 1 " TABLET BY MOUTH EVERY DAY  Authorizing Provider: DELISA ONEIL  Ordering User: SHAWNA SIMON

## 2021-04-03 ENCOUNTER — HEALTH MAINTENANCE LETTER (OUTPATIENT)
Age: 51
End: 2021-04-03

## 2021-08-13 ENCOUNTER — MYC MEDICAL ADVICE (OUTPATIENT)
Dept: FAMILY MEDICINE | Facility: CLINIC | Age: 51
End: 2021-08-13

## 2021-08-24 ENCOUNTER — VIRTUAL VISIT (OUTPATIENT)
Dept: FAMILY MEDICINE | Facility: CLINIC | Age: 51
End: 2021-08-24
Payer: COMMERCIAL

## 2021-08-24 DIAGNOSIS — Z86.79 H/O PAROXYSMAL SUPRAVENTRICULAR TACHYCARDIA: ICD-10-CM

## 2021-08-24 DIAGNOSIS — G56.03 BILATERAL CARPAL TUNNEL SYNDROME: ICD-10-CM

## 2021-08-24 DIAGNOSIS — R05.9 COUGH: Primary | ICD-10-CM

## 2021-08-24 PROCEDURE — 99215 OFFICE O/P EST HI 40 MIN: CPT | Mod: 95 | Performed by: NURSE PRACTITIONER

## 2021-08-24 RX ORDER — DEXTROMETHORPHAN POLISTIREX 30 MG/5ML
60 SUSPENSION ORAL 2 TIMES DAILY
Qty: 148 ML | Refills: 1 | Status: SHIPPED | OUTPATIENT
Start: 2021-08-24 | End: 2022-07-05

## 2021-08-24 NOTE — PROGRESS NOTES
Wanda is a 51 year old who is being evaluated via a billable telephone visit.      What phone number would you like to be contacted at? [phone]  How would you like to obtain your AVS? MyChart    Assessment & Plan     Cough  Persistent cough post Covid-19 infection. Encouraged patient to continue with Robitussin to help with reducing her cough.   - dextromethorphan (DELSYM) 30 MG/5ML liquid; Take 10 mLs (60 mg) by mouth 2 times daily    Bilateral carpal tunnel syndrome  Worsening, Discussed the cause for her carpal tunnel. Home remedies discussed and also we went through some options to do at home before considering a visit with orthopedics for her issues.   - diclofenac (VOLTAREN) 1 % topical gel; Apply 2 g topically 4 times daily    H/O paroxysmal supraventricular tachycardia  Patient is reporting an increase in her symptoms. We discussed documenting the frequency to determine if this is physiological or physical.   Discussed testing including: EKG, blood work and also checking in with cardiology.   Therapy was also discussed if this was something that is caused by her stress levels causing panic/anxiety attacks.       62 minutes spent on the date of the encounter doing chart review, history and exam, documentation and further activities per the note       See Patient Instructions    No follow-ups on file.    MARY JO Cavazos CNP  [facility]    Subjective   Wanda is a 51 year old who presents for the following health issues:    HPI     Acute Illness: Cough  Acute illness concerns: Cough with green sputum x 1.  Cough since 8/9/2021.   Had Covid and hospitalized for Covid pneumonia. Only one instance on green phlegm- still has the cough.   Onset/Duration: started when she had covid   Symptoms:  Fever: no  Chills/Sweats: no  Headache (location?): no  Sinus Pressure: no  Conjunctivitis:  no  Ear Pain: no  Rhinorrhea: no  Congestion: YES  Sore Throat: no  Cough: YES-productive of  green sputum  Wheeze: no  Decreased Appetite: no  Nausea: no  Vomiting: no  Diarrhea: no  Dysuria/Freq.: no  Dysuria or Hematuria: no  Fatigue/Achiness: YES  Sick/Strep Exposure: no  Therapies tried and outcome: None;     Hx of PSVT: happening more frequently- a few times per month. Has been using valsava maneuvers and this has worked in the past, but feels like they are happening more frequently and this is scary for her. Has a lot of stressors at home and has been struggling. Patient's mother is currently hospitalized and this has been causing some overall emotional stress.     Carpal tunnel: Works on the computer all the time. Wrists and fore arms hurt all the time. Gets numbness on both hands. Pain to wrist; Numbness and tingling to bilateral hands.   Massages have been helpful. No other home remedies. More on right hand than the left due to her dominance.       Review of Systems   Constitutional, HEENT, cardiovascular, pulmonary, gi and gu systems are negative, except as otherwise noted.      Objective         Vitals:  No vitals were obtained today due to virtual visit.    Physical Exam   healthy, alert and no distress  PSYCH: Alert and oriented times 3; coherent speech, normal   rate and volume, able to articulate logical thoughts, able   to abstract reason, no tangential thoughts, no hallucinations   or delusions  Her affect is normal and pleasant  RESP: No cough, no audible wheezing, able to talk in full sentences  Remainder of exam unable to be completed due to telephone visits    Labs Reviewed.         Phone call duration: 32 minutes

## 2021-08-24 NOTE — PATIENT INSTRUCTIONS
Patient Education     Supraventricular Tachycardia  What is supraventricular tachycardia?  Supraventricular tachycardias (SVT) are a group of abnormally fast heart rhythms (heartbeats). It's a problem in the electrical system of the heart. The word supraventricular means above the ventricles. With SVT, the abnormal rhythm starts in the upper heart chambers (atria). Also, known as paroxysmal supraventricular tachycardia as these fast heart rhythms may start and stop abruptly and can occur with intervals of normal heart rhythm.   Normally, a special group of cells begin the electrical signal to start your heartbeat. These cells are in the sinoatrial (SA) node. In an adult, the sinus node sends out a regular electrical pulse 60 to 100 times per minute at rest. This node is in the right atrium, the upper right chamber of your heart. The signal quickly travels down your heart s conducting system to the ventricles, the two lower chambers of your heart. Along the way, the signal moves through the atrioventricular (AV) node, a special group of cells between your atria and your ventricles. From there, the signal travels to your left and right ventricle. As it travels, the signal triggers nearby parts of your heart to contract. This helps your heart pump in a coordinated way.   In SVT, the signal to start your heartbeat doesn t come from the SA node. Instead, it comes from another part of the left or right atrium, or from the AV node. An area outside the SA node begins to fire quickly, causing a rapid heartbeat of over 100 beats per minute. This shortens the time your ventricles have to fill. If your heartbeat is fast enough, your heart may not be able to pump enough blood forward to the rest of your body. The abnormal heart rhythm may last for a few seconds to a few hours before your heart returns to its normal rhythm.   There are several types of SVTs:     The most common type in adults is atrioventricular melissa reentrant  tachycardia (AVNRT). This occurs when you have two channels through the AV node, instead of just one. The electricity can get into a looping circuit with signals going down one channel and up the other. It can occur at any age, but it most often starts in young adulthood. It's slightly more common in women.    Another common type of SVT is atrioventricular reciprocating tachycardia (AVRT). In this condition, you are born with an extra electrical connection between the atrium and the ventricle (known as an accessory pathway) that can conduct electricity. This condition allows your heart to get caught up in a looping electrical circuit. The electricity either goes down the AV node and returns back to the atrium through the accessory pathway. Or the reverse occurs with the signal traveling down the accessory pathway and returning through the AV node. This circuit continues until it's interrupted and the tachycardia stops. This type of SVT is slightly more common in younger women and children.    Atrial tachycardia is another common type of SVT. In this case, a small group of cells in the atria begin to fire abnormally, triggering the fast heartbeat. Multifocal atrial tachycardia is a related type. In this case, multiple groups of cells in your atria fire abnormally. These types of SVT happen more often in middle-aged people. Multifocal atrial tachycardia is more common in people with heart failure or other heart or lung diseases.  In general, SVTs are somewhat uncommon. But they are not rare. Atrial fibrillation and atrial flutter are also technically types of SVT but these are usually  into their own category because they are associated with other risks, can last for days or even years, and have a different mechanism   What causes supraventricular tachycardia?  SVT is usually a result of faulty electrical signaling in your heart. It's commonly brought on by premature beats. Some types of SVT run in families,  so genes may play a role. Other types may be caused by lung problems. It can also be linked to a number of lifestyle habits or medical problems. Some of these include:     Excess caffeine or alcohol    Heavy smoking    Certain medicines    Heart attack    Mitral valve disease  What are the symptoms of supraventricular tachycardia?  You may not have any symptoms if you have SVT. Symptoms may vary based on how long the tachycardia lasts and how fast the heart rate is. Common symptoms include:     Chest discomfort    Shortness of breath    Fatigue    Lightheadedness or dizziness    Pulsations in the neck    Unpleasant awareness of the heartbeat (palpitations)  Fainting, more severe chest pain, and nausea are less common symptoms. Very rarely can SVT cause sudden death.   How is supraventricular tachycardia diagnosed?  Diagnosis starts with a medical history and physical exam. Your healthcare provider will also use tests to help diagnose SVT. These tests will help your provider identify the type of SVT you have. They also help your provider check for possible underlying causes and complications. Tests might include:     Electrocardiogram (ECG), the most important initial test to analyze the abnormal rhythm    Continuous electrocardiogram, to watch your heart rhythm over a longer period    Blood work, to test for various causes    Chest X-ray, to check for lung problems and examine the size of your heart    Exercise stress test, to see how your heart works during exercise    Echocardiography, to check your heart structure and function    Electrophysiologic study (EPS), to evaluate the electrical activity and pathways in your heart  Your primary healthcare provider might first diagnose your SVT. But they will likely send you to a heart doctor (cardiologist).   How is supraventricular tachycardia treated?  SVT needs short-term and long-term treatment. Options for short-term treatment include:     Maneuvers to stop  SVT    Medicines to stop SVT, like calcium channel blockers, beta blockers or adenosine    Electrocardioversion. This sends a shock to the heart to get it back to a normal rhythm.    Catheter ablation  Maneuvers are usually the first treatment unless you have severe symptoms. These attempt to activate a nerve called the vagus nerve. Activating this nerve can cause a brief slowing of your heartbeat in attempt to break the abnormal circuit. Your healthcare provider might have you do a Valsalva maneuver (you bear down with your stomach muscles, as though you were trying to have a bowel movement). Your provider might also try massaging the carotid artery in your neck, having you blow in a straw, or cough hard. Each of these techniques can sometimes bring you out of SVT. If they don t, your provider might give you medicines. If your symptoms are severe or your condition is unstable, you will usually have electrocardioversion as the first treatment. .   Long-term treatment depends on the type of SVT and the intensity of symptoms. You may not need any treatment for SVT if you have only had one episode or the episodes are very rare, especially if SVT went away with maneuvers alone. In some cases, your healthcare provider may prescribe medicines to stop SVT that you will need to take only as needed. Beta-blockers or calcium channel blockers are common choices. This may be an option for you if you have fewer than 3 episodes of SVT per year. But the medicines may often take 15 to 30 minutes to take effect. If your SVT is more frequent, you may need to take medicine every day. Some people may need to take several medicines to prevent episodes of SVT.   Catheter ablation is now often a suggested treatment for recurring SVT. In some cases, it may be the initial recommended treatment. Ablation can often cure SVT. The procedure involves placing a small catheter through a blood vessel in the groin and threaded into your heart. Your  healthcare provider then performs a small burn or small freeze on the abnormal area of your heart that is causing the fast heart rhythm. Ask your healthcare provider about what treatment strategy is right for you.   How is supraventricular tachycardia managed?  Your healthcare provider might make other recommendations to manage your SVT. These might include:     Cutting back on alcohol and caffeine    Not smoking    Reducing stress    Eating a heart-healthy diet  When should I call my healthcare provider?  Call your healthcare provider if you have severe symptoms like palpitations, lightheadedness, chest pain, or sudden shortness of breath. If your symptoms are increasing in severity or frequency, plan to see your healthcare provider as soon as possible.   Key points about supraventricular tachycardia    SVT is a type of abnormal heart rhythm. Something signals an area outside of the SA node to fire much faster than it should or something triggers the signal to follow a looping circuit. This results in a fast heartbeat that can last anywhere from a few seconds to several hours.    There are several subtypes of SVT. Your treatment options may vary based on what subtype you have.    Very rarely, SVT can cause sudden death.    You might need a shock to the heart if you are having severe symptoms from SVT.    Some people with SVT need to take medicines only when an episode of SVT happens. Others need to take medicine all the time. Ablation is often a good option for many people.    It is important to follow your healthcare provider s instructions about medicine and lifestyle management.    Next steps  Tips to help you get the most from a visit to your healthcare provider:     Know the reason for your visit and what you want to happen.    Before your visit, write down questions you want answered.    Bring someone with you to help you ask questions and remember what your provider tells you.    At the visit, write down the  name of a new diagnosis, and any new medicines, treatments, or tests. Also write down any new instructions your provider gives you.    Know why a new medicine or treatment is prescribed, and how it will help you. Also know what the side effects are.    Ask if your condition can be treated in other ways.    Know why a test or procedure is recommended and what the results could mean.    Know what to expect if you do not take the medicine or have the test or procedure.    If you have a follow-up appointment, write down the date, time, and purpose for that visit.    Know how you can contact your provider if you have questions.  [employer] last reviewed this educational content on 5/1/2019 2000-2021 [employer]. All rights reserved. This information is not intended as a substitute for professional medical care. Always follow your healthcare professional's instructions.           Patient Education     Carpal Tunnel Syndrome    Carpal tunnel syndrome is a painful condition of the wrist and arm. It is caused by pressure on the median nerve. The median nerve is one of the nerves that give feeling and movement to the hand. It passes through a tunnel in the wrist called the carpal tunnel. This tunnel is made up of bones and ligaments. Narrowing of this tunnel or swelling of the tissues inside the tunnel puts pressure on the median nerve. This causes numbness, pins and needles, or electric shooting pains in your hand and forearm. Often the pain is worse at night and may wake you when you are asleep.  Carpal tunnel syndrome may occur during pregnancy and with use of birth control pills. It is more common in workers who must often bend their wrists. It is also common in people who work with power tools that cause strong vibrations.  Home care    Rest the painful wrist. Avoid repeated bending of the wrist back and forth. This puts pressure on the median nerve. Avoid using power tools with strong vibrations.    If  you were given a splint, wear it at night while you sleep. You may also wear it during the day for comfort.    Move your fingers and wrists often to prevent stiffness.    Elevate your arms on pillows when you lie down.    Try using the unaffected hand more.    Try not to hold your wrists in a bent, downward position.    Sometimes changes in the work place may ease symptoms. If you type most of the day, it may help to change the position of your keyboard or add a wrist support. Your wrist should be in a neutral position and not bent back when typing.    You may use over-the-counter pain medicine to treat pain and inflammation, unless another medicine was prescribed. Anti-inflammatory pain medicines, such as ibuprofen or naproxen may be more effective than acetaminophen, which treats pain, but not inflammation. If you have chronic liver or kidney disease or ever had a stomach ulcer or gastrointestinal bleeding, talk with your healthcare provider before using these medicines.    Opioid pain medicine will only give temporary relief and does not treat the problem. If pain continues, you may need a shot of a steroid drug into your wrist.    If the above methods fail, you may need surgery. This will open the carpal tunnel and release the pressure on the trapped nerve.  Follow-up care  Follow up with your healthcare provider, or as advised. If X-rays were taken, you will be notified of any new findings that may affect your care.  When to seek medical advice  Call your healthcare provider right away if any of these occur:    Pain not improving with the above treatment    Fingers or hand become cold, blue, numb, or tingly    Your whole arm becomes swollen or weak  [employer] last reviewed this educational content on 5/1/2018 2000-2021 [employer]. All rights reserved. This information is not intended as a substitute for professional medical care. Always follow your healthcare professional's instructions.      Carpal Tunnel Wrist Brace by [employer] - Metal Wrist Splint for Hand and Wrist Support and Tendonitis Arthritis Pain Relief (S/M, Right Hand)- [employer].

## 2021-08-28 DIAGNOSIS — Z30.41 ENCOUNTER FOR SURVEILLANCE OF CONTRACEPTIVE PILLS: ICD-10-CM

## 2021-08-30 RX ORDER — NORETHINDRONE AND ETHINYL ESTRADIOL 1 MG-35MCG
KIT ORAL
Qty: 84 TABLET | Refills: 2 | Status: SHIPPED | OUTPATIENT
Start: 2021-08-30 | End: 2022-05-09

## 2021-08-30 NOTE — TELEPHONE ENCOUNTER
"Requested Prescriptions   Signed Prescriptions Disp Refills    NICOLE 1/35 1-35 MG-MCG tablet 84 tablet 2     Sig: TAKE 1 TABLET BY MOUTH EVERY DAY       Contraceptives Protocol Passed - 8/28/2021  7:20 AM        Passed - Patient is not a current smoker if age is 35 or older        Passed - Recent (12 mo) or future (30 days) visit within the authorizing provider's specialty     Patient has had an office visit with the authorizing provider or a provider within the authorizing providers department within the previous 12 mos or has a future within next 30 days. See \"Patient Info\" tab in inbasket, or \"Choose Columns\" in Meds & Orders section of the refill encounter.              Passed - Medication is active on med list        Passed - No active pregnancy on record        Passed - No positive pregnancy test in past 12 months       Hormone Replacement Therapy Passed - 8/28/2021  7:20 AM        Passed - Blood pressure under 140/90 in past 12 months     BP Readings from Last 3 Encounters:   09/28/20 120/80   02/21/20 134/82   03/20/19 126/78                 Passed - Recent (12 mo) or future (30 days) visit within the authorizing provider's specialty     Patient has had an office visit with the authorizing provider or a provider within the authorizing providers department within the previous 12 mos or has a future within next 30 days. See \"Patient Info\" tab in inbasket, or \"Choose Columns\" in Meds & Orders section of the refill encounter.              Passed - Patient has mammogram in past 2 years on file if age 50-75        Passed - Medication is active on med list        Passed - Patient is 18 years of age or older        Passed - No active pregnancy on record        Passed - No positive pregnancy test on record in past 12 months           Charlene Tee RN  [facility]     "

## 2021-10-26 ENCOUNTER — ALLIED HEALTH/NURSE VISIT (OUTPATIENT)
Dept: FAMILY MEDICINE | Facility: CLINIC | Age: 51
End: 2021-10-26
Payer: COMMERCIAL

## 2021-10-26 DIAGNOSIS — Z23 NEEDS FLU SHOT: Primary | ICD-10-CM

## 2021-10-26 PROCEDURE — 99207 PR NO CHARGE NURSE ONLY: CPT

## 2021-10-26 PROCEDURE — 90471 IMMUNIZATION ADMIN: CPT

## 2021-10-26 PROCEDURE — 90682 RIV4 VACC RECOMBINANT DNA IM: CPT

## 2022-01-04 ENCOUNTER — MYC MEDICAL ADVICE (OUTPATIENT)
Dept: FAMILY MEDICINE | Facility: CLINIC | Age: 52
End: 2022-01-04
Payer: COMMERCIAL

## 2022-01-05 ENCOUNTER — OFFICE VISIT (OUTPATIENT)
Dept: FAMILY MEDICINE | Facility: CLINIC | Age: 52
End: 2022-01-05
Payer: COMMERCIAL

## 2022-01-05 ENCOUNTER — NURSE TRIAGE (OUTPATIENT)
Dept: FAMILY MEDICINE | Facility: CLINIC | Age: 52
End: 2022-01-05

## 2022-01-05 VITALS
HEIGHT: 66 IN | TEMPERATURE: 99.1 F | WEIGHT: 140 LBS | OXYGEN SATURATION: 100 % | DIASTOLIC BLOOD PRESSURE: 78 MMHG | SYSTOLIC BLOOD PRESSURE: 130 MMHG | BODY MASS INDEX: 22.5 KG/M2 | HEART RATE: 100 BPM

## 2022-01-05 DIAGNOSIS — Z20.3 NEED FOR POST EXPOSURE PROPHYLAXIS FOR RABIES: ICD-10-CM

## 2022-01-05 DIAGNOSIS — S61.259A ANIMAL BITE OF FINGER, INITIAL ENCOUNTER: Primary | ICD-10-CM

## 2022-01-05 PROCEDURE — 90715 TDAP VACCINE 7 YRS/> IM: CPT | Performed by: FAMILY MEDICINE

## 2022-01-05 PROCEDURE — 90472 IMMUNIZATION ADMIN EACH ADD: CPT | Performed by: FAMILY MEDICINE

## 2022-01-05 PROCEDURE — 90471 IMMUNIZATION ADMIN: CPT | Performed by: FAMILY MEDICINE

## 2022-01-05 PROCEDURE — 99214 OFFICE O/P EST MOD 30 MIN: CPT | Mod: 25 | Performed by: FAMILY MEDICINE

## 2022-01-05 PROCEDURE — 90675 RABIES VACCINE IM: CPT | Performed by: FAMILY MEDICINE

## 2022-01-05 ASSESSMENT — MIFFLIN-ST. JEOR: SCORE: 1269.04

## 2022-01-05 NOTE — Clinical Note
Follow-up per nursing instructions for subsequent rabies shots:    RV #2 Day 3 Sat go to any open urgent care with the vaccine from the pharmacy  RV #3 Day 7 next Wed- schedule nurse only appointment here at [facility]  RV #4 Day 14 Wed Jan 19- schedule nurse only appointment here at [facility]

## 2022-01-05 NOTE — TELEPHONE ENCOUNTER
Pt sent mychart prior. 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   Often there are wild opossums sleeping with the cats so she is uncertain what the bit was from.   The puncture wound was bleeding. Wound was washed out with water then rinsed with rubbing alcohol then peroxide and applied antibiotic ointment.     Huddle with Dr. Pickens, Pt can come to clinic prior to his 1 pm Pt to complete post-exposure prophylaxis.

## 2022-01-05 NOTE — PROGRESS NOTES
"  Assessment & Plan     Animal bite of finger, initial encounter  Feral animal source, without obvious current infection    Need for post exposure prophylaxis for rabies  - rabies immune globulin 300 units/mL (HYPERAB) injection 1,260 Units  - RABIES VACCINE, IM (IMOVAX)  - TDAP VACCINE (Adacel, Boostrix)  [[id number]]  - RABIES VACCINE, IM (IMOVAX); Future    Review of external notes as documented elsewhere in note  Prescription drug management  25 minutes spent on the date of the encounter doing chart review, history and exam, documentation and further activities per the note     Patient Instructions   Follow-up per nursing instructions for subsequent rabies shots:    RV #2 Day 3 Sat go to any open urgent care with the vaccine from the pharmacy  RV #3 Day 7 next Wed- schedule nurse only appointment here at [facility]  RV #4 Day 14 Wed Jan 19- schedule nurse only appointment here at [facility]            Return if symptoms worsen or fail to improve.    Sven Pickens MD  [facility]    Sydnie Muñoz is a 51 year old who presents for the following health issues    HPI     Concern - Animal bite-feral cat or Opossum (see previous triage notes)   Onset: 1/4/22 Sunday evening  Description: two small punctures on distal portion of right 4th digit (ring finger)   Intensity: mild  Progression of Symptoms: improving  Accompanying Signs & Symptoms: bleeding with initial injury   Previous history of similar problem: No   Precipitating factors:        Worsened by: n/a  Alleviating factors:        Improved by: irrigating wound   Therapies tried and outcome: washed wound with soap/water, alcohol, peroxide and applied antibiotic ointment.     Review of Systems   Constitutional, HEENT, cardiovascular, pulmonary, gi and gu systems are negative, except as otherwise noted.      Objective    /78   Pulse 100   Temp 99.1  F (37.3  C)   Ht 1.68 m (5' 6.14\")   Wt 63.5 kg (140 lb)   SpO2 " 100%   BMI 22.50 kg/m    Body mass index is 22.5 kg/m .  Physical Exam   GENERAL: healthy, alert and mild distress  RESP: lungs clear to auscultation - no rales, rhonchi or wheezes  CV: regular rate and rhythm, normal S1 S2, no S3 or S4, no murmur, click or rub, no peripheral edema and peripheral pulses strong  MS: RUE exam shows 2 small puncture wounds 3 to 4 mm apart in the finger pad and the right fourth ring finger  SKIN: no suspicious lesions or rashes  NEURO: Normal strength and tone, mentation intact and speech normal  PSYCH: mentation appears normal and anxious

## 2022-01-05 NOTE — TELEPHONE ENCOUNTER
Reason for call:  Patient reporting a symptom    Symptom or request: Animal Bite    Duration (how long have symptoms been present): About four days ago     Have you been treated for this before? No    Additional comments Bite on right ring finger.  Pt is very concern. Per my chart message, pt submitted picture.    Phone Number patient can be reached at:  Home number on file [phone] (home)    Best Time:  Anytime     Can we leave a detailed message on this number:  YES    Call taken on 1/5/2022 at 8:30 AM by Jackeline Singh MA

## 2022-01-06 NOTE — TELEPHONE ENCOUNTER
Spoke with Pt, [facility] to get shot done since they are in clinic on sat.     Future Appointments   Date Time Provider Department Center   1/8/2022 10:00 AM [facility]   1/12/2022  3:45 PM [facility] FLOAT NURSE [facility]   1/19/2022  3:45 PM [facility] FLOAT NURSE [facility]

## 2022-01-06 NOTE — PATIENT INSTRUCTIONS
Follow-up per nursing instructions for subsequent rabies shots:    RV #2 Day 3 Sat go to any open urgent care with the vaccine from the pharmacy  RV #3 Day 7 next Wed- schedule nurse only appointment here at [facility]  RV #4 Day 14 Wed Jan 19- schedule nurse only appointment here at [facility]

## 2022-01-08 ENCOUNTER — ALLIED HEALTH/NURSE VISIT (OUTPATIENT)
Dept: NURSING | Facility: CLINIC | Age: 52
End: 2022-01-08
Payer: COMMERCIAL

## 2022-01-08 DIAGNOSIS — Z20.3 NEED FOR POST EXPOSURE PROPHYLAXIS FOR RABIES: ICD-10-CM

## 2022-01-08 PROCEDURE — 99207 PR NO CHARGE NURSE ONLY: CPT

## 2022-01-08 PROCEDURE — 90471 IMMUNIZATION ADMIN: CPT

## 2022-01-08 PROCEDURE — 90675 RABIES VACCINE IM: CPT

## 2022-01-12 ENCOUNTER — ALLIED HEALTH/NURSE VISIT (OUTPATIENT)
Dept: FAMILY MEDICINE | Facility: CLINIC | Age: 52
End: 2022-01-12
Payer: COMMERCIAL

## 2022-01-12 DIAGNOSIS — Z23 NEED FOR VACCINATION: Primary | ICD-10-CM

## 2022-01-12 PROCEDURE — 90675 RABIES VACCINE IM: CPT

## 2022-01-12 PROCEDURE — 90471 IMMUNIZATION ADMIN: CPT

## 2022-01-12 PROCEDURE — 99207 PR NO CHARGE NURSE ONLY: CPT

## 2022-01-12 NOTE — PROGRESS NOTES
3rd dose given of the rabies vaccine, pt tolerated well    Antionette Slater RN   [facility]         stable

## 2022-01-19 ENCOUNTER — ALLIED HEALTH/NURSE VISIT (OUTPATIENT)
Dept: FAMILY MEDICINE | Facility: CLINIC | Age: 52
End: 2022-01-19
Payer: COMMERCIAL

## 2022-01-19 DIAGNOSIS — Z23 NEED FOR VACCINATION: Primary | ICD-10-CM

## 2022-01-19 PROCEDURE — 90675 RABIES VACCINE IM: CPT

## 2022-01-19 PROCEDURE — 90471 IMMUNIZATION ADMIN: CPT

## 2022-01-19 PROCEDURE — 99207 PR NO CHARGE NURSE ONLY: CPT

## 2022-01-19 NOTE — TELEPHONE ENCOUNTER
Dr[facility] Hinds,     [facility]     Here is the graph I found on exposures. 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     https://www.cdc.gov/rabies/specific_groups/travelers/pre-exposure_vaccinations.html

## 2022-01-21 NOTE — TELEPHONE ENCOUNTER
These are the notes from her visit with Dr. Pickens. Does this cover her question?  KATHERIN Santos     Notes:  Follow-up per nursing instructions for subsequent rabies shots:     RV #2 Day 3 Sat go to any open urgent care with the vaccine from the pharmacy  RV #3 Day 7 next Wed- schedule nurse only appointment here at [facility]  RV #4 Day 14 Wed Jan 19- schedule nurse only appointment here at [facility]

## 2022-01-21 NOTE — TELEPHONE ENCOUNTER
[employer] message sent to patient.     Thanks,  PEYTON Chang  [facility]

## 2022-01-22 NOTE — TELEPHONE ENCOUNTER
You are correct!  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  Furthermore, it suggests checking antibody titers every 2 years to make sure that it does not fall below a 1:5 ratio.  Please notify, and thanks for alerting me to this important recommendation!  -Sven

## 2022-02-16 ENCOUNTER — ALLIED HEALTH/NURSE VISIT (OUTPATIENT)
Dept: FAMILY MEDICINE | Facility: CLINIC | Age: 52
End: 2022-02-16
Payer: COMMERCIAL

## 2022-02-16 DIAGNOSIS — Z23 NEED FOR VACCINATION: Primary | ICD-10-CM

## 2022-02-16 PROCEDURE — 90675 RABIES VACCINE IM: CPT

## 2022-02-16 PROCEDURE — 90471 IMMUNIZATION ADMIN: CPT

## 2022-02-16 PROCEDURE — 99207 PR NO CHARGE NURSE ONLY: CPT

## 2022-04-25 ENCOUNTER — OFFICE VISIT (OUTPATIENT)
Dept: FAMILY MEDICINE | Facility: CLINIC | Age: 52
End: 2022-04-25

## 2022-04-25 ENCOUNTER — LAB (OUTPATIENT)
Dept: LAB | Facility: CLINIC | Age: 52
End: 2022-04-25
Payer: COMMERCIAL

## 2022-04-25 VITALS
HEART RATE: 90 BPM | BODY MASS INDEX: 23.14 KG/M2 | SYSTOLIC BLOOD PRESSURE: 120 MMHG | OXYGEN SATURATION: 100 % | TEMPERATURE: 97 F | WEIGHT: 144 LBS | DIASTOLIC BLOOD PRESSURE: 78 MMHG

## 2022-04-25 DIAGNOSIS — R00.2 PALPITATIONS: ICD-10-CM

## 2022-04-25 DIAGNOSIS — L72.3 INFECTED SEBACEOUS CYST OF SKIN: Primary | ICD-10-CM

## 2022-04-25 DIAGNOSIS — K29.00 ACUTE SUPERFICIAL GASTRITIS WITHOUT HEMORRHAGE: ICD-10-CM

## 2022-04-25 DIAGNOSIS — L08.9 INFECTED SEBACEOUS CYST OF SKIN: Primary | ICD-10-CM

## 2022-04-25 DIAGNOSIS — Z12.11 SPECIAL SCREENING FOR MALIGNANT NEOPLASMS, COLON: ICD-10-CM

## 2022-04-25 LAB
ALBUMIN SERPL-MCNC: 3.9 G/DL (ref 3.4–5)
ALP SERPL-CCNC: 48 U/L (ref 40–150)
ALT SERPL W P-5'-P-CCNC: 23 U/L (ref 0–50)
ANION GAP SERPL CALCULATED.3IONS-SCNC: 7 MMOL/L (ref 3–14)
AST SERPL W P-5'-P-CCNC: 18 U/L (ref 0–45)
BILIRUB SERPL-MCNC: 0.5 MG/DL (ref 0.2–1.3)
BUN SERPL-MCNC: 9 MG/DL (ref 7–30)
CALCIUM SERPL-MCNC: 9.3 MG/DL (ref 8.5–10.1)
CHLORIDE BLD-SCNC: 106 MMOL/L (ref 94–109)
CO2 SERPL-SCNC: 24 MMOL/L (ref 20–32)
CREAT SERPL-MCNC: 0.75 MG/DL (ref 0.52–1.04)
GFR SERPL CREATININE-BSD FRML MDRD: >90 ML/MIN/1.73M2
GLUCOSE BLD-MCNC: 83 MG/DL (ref 70–99)
POTASSIUM BLD-SCNC: 4 MMOL/L (ref 3.4–5.3)
PROT SERPL-MCNC: 8.2 G/DL (ref 6.8–8.8)
SODIUM SERPL-SCNC: 137 MMOL/L (ref 133–144)
TSH SERPL DL<=0.005 MIU/L-ACNC: 2.6 MU/L (ref 0.4–4)

## 2022-04-25 PROCEDURE — 80053 COMPREHEN METABOLIC PANEL: CPT

## 2022-04-25 PROCEDURE — 93000 ELECTROCARDIOGRAM COMPLETE: CPT | Performed by: FAMILY MEDICINE

## 2022-04-25 PROCEDURE — 10060 I&D ABSCESS SIMPLE/SINGLE: CPT | Mod: 59 | Performed by: FAMILY MEDICINE

## 2022-04-25 PROCEDURE — 99214 OFFICE O/P EST MOD 30 MIN: CPT | Mod: 25 | Performed by: FAMILY MEDICINE

## 2022-04-25 PROCEDURE — 84443 ASSAY THYROID STIM HORMONE: CPT

## 2022-04-25 PROCEDURE — 36415 COLL VENOUS BLD VENIPUNCTURE: CPT

## 2022-04-25 PROCEDURE — 87070 CULTURE OTHR SPECIMN AEROBIC: CPT | Performed by: FAMILY MEDICINE

## 2022-04-25 RX ORDER — FAMOTIDINE 20 MG/1
20 TABLET, FILM COATED ORAL 2 TIMES DAILY
Qty: 60 TABLET | Refills: 1 | Status: SHIPPED | OUTPATIENT
Start: 2022-04-25 | End: 2022-07-05

## 2022-04-25 RX ORDER — SULFAMETHOXAZOLE/TRIMETHOPRIM 800-160 MG
1 TABLET ORAL 2 TIMES DAILY
Qty: 20 TABLET | Refills: 0 | Status: SHIPPED | OUTPATIENT
Start: 2022-04-25 | End: 2022-05-05

## 2022-04-25 RX ORDER — FLUCONAZOLE 150 MG/1
150 TABLET ORAL ONCE
Qty: 1 TABLET | Refills: 0 | Status: SHIPPED | OUTPATIENT
Start: 2022-04-25 | End: 2022-04-25

## 2022-04-25 NOTE — PROGRESS NOTES
Assessment & Plan     Infected sebaceous cyst of skin   Effected area cleaned   with alcoholol.  1 pecent lidocaine with epineprhine used to infiltrate the area with good anethesia.  Number 11 scapel used to make a stab incion.  Purlent drainage was removed . No gauze was needed as area was small. Appropraite wound care dressing applied.  Pt tolerated preocedure well.  Now I and d'd   Warm soaks tid  - Abscess Aerobic Bacterial Culture Routine  - sulfamethoxazole-trimethoprim (BACTRIM DS) 800-160 MG tablet; Take 1 tablet by mouth 2 times daily for 10 days  - fluconazole (DIFLUCAN) 150 MG tablet; Take 1 tablet (150 mg) by mouth once for 1 dose    Palpitations  Likely benign  - EKG 12-lead complete w/read - Clinics    Acute superficial gastritis without hemorrhage  Worse wit hstress  - TSH with free T4 reflex; Future  - famotidine (PEPCID) 20 MG tablet; Take 1 tablet (20 mg) by mouth 2 times daily  - Comprehensive metabolic panel (BMP + Alb, Alk Phos, ALT, AST, Total. Bili, TP); Future    Special screening for malignant neoplasms, colon  Need sto do  - Adult Gastro Ref - Procedure Only; Future             Regular exercise  See Patient Instructions    No follow-ups on file.    Jose De La Torre MD  [facility]    Sydnie Muñoz is a 52 year old who presents for the following health issues     HPI     Bite on stomach  Onset/Duration: Last Wednesday stayed at a hotel for a work meeting  Description  Location: right side upper abdominal   Character: round, raised, painful, burning, red  Itching: no  Intensity:  Moderate and severe   Progression of Symptoms:  worsening  Accompanying signs and symptoms:   Fever: no  Body aches or joint pain: YES- aches but not sure if from it   Sore throat symptoms: no  Recent cold symptoms: no  History:           Previous episodes of similar rash: similar but was not as big as this   New exposures:  Not sure   Recent travel: YES- [place]   Exposure to  similar rash: no  Precipitating or alleviating factors: can't wear a bra, will irritate it,   Therapies tried and outcome: triple biotic cream on it last night but seems to be getting bigger    Questions/Concerns: would like to check about PSVT, could not get appt with Lizet.         Review of Systems   Constitutional, HEENT, cardiovascular, pulmonary, gi and gu systems are negative, except as otherwise noted.      Objective    /78   Pulse 90   Temp 97  F (36.1  C) (Temporal)   Wt 65.3 kg (144 lb)   SpO2 100%   BMI 23.14 kg/m    Body mass index is 23.14 kg/m .  Physical Exam   GENERAL: alert and mild distress  NECK: no adenopathy, no asymmetry, masses, or scars and thyroid normal to palpation  RESP: lungs clear to auscultation - no rales, rhonchi or wheezes  CV: regular rate and rhythm, normal S1 S2, no S3 or S4, no murmur, click or rub, no peripheral edema and peripheral pulses strong  ABDOMEN: soft, nontender, no hepatosplenomegaly, no masses and bowel sounds normal  SKIN: 2-3 cm boil/ pustule - lower chest  NEURO: Normal strength and tone, mentation intact and speech normal  PSYCH: mentation appears normal, affect normal/bright  LYMPH: no cervical, supraclavicular, axillary, or inguinal adenopathy    Orders Only on 09/30/2020   Component Date Value Ref Range Status     Cholesterol 09/30/2020 199  <200 mg/dL Final    Desirable:       <200 mg/dl     Triglycerides 09/30/2020 121  <150 mg/dL Final     HDL Cholesterol 09/30/2020 63  >49 mg/dL Final     LDL Cholesterol Calculated 09/30/2020 112 (A) <100 mg/dL Final    Comment: Above desirable:  100-129 mg/dl  Borderline High:  130-159 mg/dL  High:             160-189 mg/dL  Very high:       >189 mg/dl       Non HDL Cholesterol 09/30/2020 136 (A) <130 mg/dL Final    Comment: Above Desirable:  130-159 mg/dl  Borderline high:  160-189 mg/dl  High:             190-219 mg/dl  Very high:       >219 mg/dl       Vitamin D Deficiency screening 09/30/2020 83 (A) 20 -  75 ug/L Final    Comment: Season, race, dietary intake, and treatment affect the concentration of   25-hydroxy-Vitamin D. Values may decrease during winter months and increase   during summer months. Values 20-29 ug/L may indicate Vitamin D insufficiency   and values <20 ug/L may indicate Vitamin D deficiency.  Vitamin D determination is routinely performed by an immunoassay specific for   25 hydroxyvitamin D3.  If an individual is on vitamin D2 (ergocalciferol)   supplementation, please specify 25 OH vitamin D2 and D3 level determination by   LCMSMS test [id number].       CRP Inflammation 09/30/2020 5.5  0.0 - 8.0 mg/L Final     Sodium 09/30/2020 139  133 - 144 mmol/L Final     Potassium 09/30/2020 4.4  3.4 - 5.3 mmol/L Final     Chloride 09/30/2020 108  94 - 109 mmol/L Final     Carbon Dioxide 09/30/2020 25  20 - 32 mmol/L Final     Anion Gap 09/30/2020 6  3 - 14 mmol/L Final     Glucose 09/30/2020 83  70 - 99 mg/dL Final     Urea Nitrogen 09/30/2020 9  7 - 30 mg/dL Final     Creatinine 09/30/2020 0.78  0.52 - 1.04 mg/dL Final     GFR Estimate 09/30/2020 88  >60 mL/min/[1.73_m2] Final    Comment: Non  GFR Calc  Starting 12/18/2018, serum creatinine based estimated GFR (eGFR) will be   calculated using the Chronic Kidney Disease Epidemiology Collaboration   (CKD-EPI) equation.       GFR Estimate If Black 09/30/2020 >90  >60 mL/min/[1.73_m2] Final    Comment:  GFR Calc  Starting 12/18/2018, serum creatinine based estimated GFR (eGFR) will be   calculated using the Chronic Kidney Disease Epidemiology Collaboration   (CKD-EPI) equation.       Calcium 09/30/2020 9.2  8.5 - 10.1 mg/dL Final     Bilirubin Total 09/30/2020 0.5  0.2 - 1.3 mg/dL Final     Albumin 09/30/2020 3.7  3.4 - 5.0 g/dL Final     Protein Total 09/30/2020 7.9  6.8 - 8.8 g/dL Final     Alkaline Phosphatase 09/30/2020 39 (A) 40 - 150 U/L Final     ALT 09/30/2020 29  0 - 50 U/L Final     AST 09/30/2020 23  0 - 45 U/L Final      TSH 09/30/2020 2.46  0.40 - 4.00 mU/L Final     WBC 09/30/2020 5.2  4.0 - 11.0 10e9/L Final     RBC Count 09/30/2020 4.59  3.8 - 5.2 10e12/L Final     Hemoglobin 09/30/2020 14.0  11.7 - 15.7 g/dL Final     Hematocrit 09/30/2020 42.8  35.0 - 47.0 % Final     MCV 09/30/2020 93  78 - 100 fl Final     MCH 09/30/2020 30.5  26.5 - 33.0 pg Final     MCHC 09/30/2020 32.7  31.5 - 36.5 g/dL Final     RDW 09/30/2020 11.9  10.0 - 15.0 % Final     Platelet Count 09/30/2020 256  150 - 450 10e9/L Final     Diff Method 09/30/2020 Automated Method   Final     % Neutrophils 09/30/2020 44.8  % Final     % Lymphocytes 09/30/2020 38.7  % Final     % Monocytes 09/30/2020 7.3  % Final     % Eosinophils 09/30/2020 7.9  % Final     % Basophils 09/30/2020 1.1  % Final     % Immature Granulocytes 09/30/2020 0.2  % Final     Nucleated RBCs 09/30/2020 0  0 /100 Final     Absolute Neutrophil 09/30/2020 2.3  1.6 - 8.3 10e9/L Final     Absolute Lymphocytes 09/30/2020 2.0  0.8 - 5.3 10e9/L Final     Absolute Monocytes 09/30/2020 0.4  0.0 - 1.3 10e9/L Final     Absolute Eosinophils 09/30/2020 0.4  0.0 - 0.7 10e9/L Final     Absolute Basophils 09/30/2020 0.1  0.0 - 0.2 10e9/L Final     Abs Immature Granulocytes 09/30/2020 0.0  0 - 0.4 10e9/L Final     Absolute Nucleated RBC 09/30/2020 0.0   Final     Ferritin 09/30/2020 132  8 - 252 ng/mL Final                . ,

## 2022-04-29 LAB
BACTERIA ABSC ANAEROBE+AEROBE CULT: NORMAL
BACTERIA ABSC ANAEROBE+AEROBE CULT: NORMAL

## 2022-05-08 DIAGNOSIS — Z30.41 ENCOUNTER FOR SURVEILLANCE OF CONTRACEPTIVE PILLS: ICD-10-CM

## 2022-05-09 ENCOUNTER — TELEPHONE (OUTPATIENT)
Dept: GASTROENTEROLOGY | Facility: CLINIC | Age: 52
End: 2022-05-09
Payer: COMMERCIAL

## 2022-05-09 RX ORDER — NORETHINDRONE AND ETHINYL ESTRADIOL 1 MG-35MCG
KIT ORAL
Qty: 84 TABLET | Refills: 0 | Status: SHIPPED | OUTPATIENT
Start: 2022-05-09 | End: 2022-07-05

## 2022-05-09 NOTE — TELEPHONE ENCOUNTER
"Requested Prescriptions   Pending Prescriptions Disp Refills     ALAYCEN 1/35 1-35 MG-MCG tablet [Pharmacy Med Name: ALYACEN 1-35 28 TABLET] 84 tablet 2     Sig: TAKE 1 TABLET BY MOUTH EVERY DAY       Hormone Replacement Therapy Failed - 5/8/2022  7:41 AM        Failed - Patient has mammogram in past 2 years on file if age 50-75        Passed - Blood pressure under 140/90 in past 12 months     BP Readings from Last 3 Encounters:   04/25/22 120/78   01/05/22 130/78   09/28/20 120/80                 Passed - Recent (12 mo) or future (30 days) visit within the authorizing provider's specialty     Patient has had an office visit with the authorizing provider or a provider within the authorizing providers department within the previous 12 mos or has a future within next 30 days. See \"Patient Info\" tab in inbasket, or \"Choose Columns\" in Meds & Orders section of the refill encounter.              Passed - Medication is active on med list        Passed - Patient is 18 years of age or older        Passed - No active pregnancy on record        Passed - No positive pregnancy test on record in past 12 months       Contraceptives Protocol Passed - 5/8/2022  7:41 AM        Passed - Patient is not a current smoker if age is 35 or older        Passed - Recent (12 mo) or future (30 days) visit within the authorizing provider's specialty     Patient has had an office visit with the authorizing provider or a provider within the authorizing providers department within the previous 12 mos or has a future within next 30 days. See \"Patient Info\" tab in inbasket, or \"Choose Columns\" in Meds & Orders section of the refill encounter.              Passed - Medication is active on med list        Passed - No active pregnancy on record        Passed - No positive pregnancy test in past 12 months           Routing refill request to provider for review/approval because:  Needs mammogram    Charlene Tee RN  [facility] "

## 2022-05-09 NOTE — TELEPHONE ENCOUNTER
Screening Questions  BlueKIND OF PREP RedLOCATION [review exclusion criteria] GreenSEDATION TYPE  1. Have you had a positive covid test in the last 90 days? N     2. Do you have a legal guardian or medical Power of ?  Are you able to give consent for your medical care?N (Sedation review/consideration needed)    3. Are you active on mychart? Y    4. What insurance is in the chart? BCBS     3.   Ordering/Referring Provider: Jose De La Torre MD     4. BMI 22.6   [BMI OVER 40-EXTENDED PREP]  If greater than 40 review exclusion criteria [PAC APPT IF @ [facility]]        5.  Respiratory Screening :  [If yes to any of the following HOSPITAL setting only]     Do you use daily home oxygen? N    Do you have mod to severe Obstructive Sleep Apnea? N  [OKAY @ [facility] SH PH RI]   Do you have Pulmonary Hypertension? N     Do you have UNCONTROLLED asthma? N        6.   Have you had a heart or lung transplant? N      7.   Are you currently on dialysis? N [ If yes, G-PREP & HOSPITAL setting only]     8.   Do you have chronic kidney disease? N [ If yes, G-PREP ]    9.   Have you had a stroke or Transient ischemic attack (TIA - aka  mini stroke ) within 6 months?  N (If yes, please review exclusion criteria)    10.   In the past 6 months, have you had any heart related issues including cardiomyopathy or heart attack? N           If yes, did it require cardiac stenting or other implantable device? N      11.   Do you have any implantable devices in your body (pacemaker, defib, LVAD)? N (If yes, please review exclusion criteria)    12.   Do you take nitroglycerin? N           If yes, how often? N  (if yes, HOSPITAL setting ONLY)    13.   Are you currently taking any blood thinners? N           [IF YES, INFORM PATIENT TO FOLLOW UP W/ ORDERING PROVIDER FOR BRIDGING INSTRUCTIONS]     14.   Do you have a diagnosis of diabetes? N   [ If yes, G-PREP ]    15.   [FEMALES] Are you currently pregnant? N    If yes, how many  weeks? N    16.   Are you taking any prescription pain medications on a routine schedule?  N  [ If yes, EXTENDED PREP.] [If yes, MAC]    17.   Do you have any chemical dependencies such as alcohol, street drugs, or methadone?  N [If yes, MAC]    18.   Do you have any history of post-traumatic stress syndrome, severe anxiety or history of psychosis?  N  [If yes, MAC]    19.   Do you transfer independently?  Y    20.  On a regular basis do you go 3-5 days between bowel movements? N   [ If yes, EXTENDED PREP.]    21.   Preferred LOCAL Pharmacy for Pre Prescription Y     [id number] - [place], [place] - [address]      Scheduling Details      Caller : Wanda  (Please ask for phone number if not scheduled by patient)    Type of Procedure Scheduled: Colon  Which Colonoscopy Prep was Sent?: M Prep  KHORUTS CF PATIENTS & GROEN'S PATIENTS NEEDS EXTENDED PREP  Surgeon: Víctor  Date of Procedure: 7-13  Location: [facility]      Sedation Type: CS  Conscious Sedation- Needs  for 6 hours after the procedure  MAC/General-Needs  for 24 hours after procedure    Pre-op Required at [facility] [place], [place] and OR for MAC sedation: Y  (advise patient they will need a pre-op prior to procedure -)      Informed patient they will need an adult  Y  Cannot take any type of public or medical transportation alone    [facility]Pro[facility] or Externally: Y [facility] 7-9    Confirmed Nurse will call to complete assessment Y    Additional comments:

## 2022-06-17 ENCOUNTER — OFFICE VISIT (OUTPATIENT)
Dept: FAMILY MEDICINE | Facility: CLINIC | Age: 52
End: 2022-06-17
Payer: COMMERCIAL

## 2022-06-17 VITALS
DIASTOLIC BLOOD PRESSURE: 72 MMHG | HEART RATE: 86 BPM | SYSTOLIC BLOOD PRESSURE: 118 MMHG | TEMPERATURE: 98.8 F | OXYGEN SATURATION: 99 %

## 2022-06-17 DIAGNOSIS — Z86.79 HISTORY OF PSVT (PAROXYSMAL SUPRAVENTRICULAR TACHYCARDIA): ICD-10-CM

## 2022-06-17 DIAGNOSIS — R50.9 FEVER, UNSPECIFIED: ICD-10-CM

## 2022-06-17 DIAGNOSIS — R00.2 PALPITATIONS: ICD-10-CM

## 2022-06-17 DIAGNOSIS — R05.9 COUGH: Primary | ICD-10-CM

## 2022-06-17 PROCEDURE — U0003 INFECTIOUS AGENT DETECTION BY NUCLEIC ACID (DNA OR RNA); SEVERE ACUTE RESPIRATORY SYNDROME CORONAVIRUS 2 (SARS-COV-2) (CORONAVIRUS DISEASE [COVID-19]), AMPLIFIED PROBE TECHNIQUE, MAKING USE OF HIGH THROUGHPUT TECHNOLOGIES AS DESCRIBED BY CMS-2020-01-R: HCPCS | Performed by: FAMILY MEDICINE

## 2022-06-17 PROCEDURE — U0005 INFEC AGEN DETEC AMPLI PROBE: HCPCS | Performed by: FAMILY MEDICINE

## 2022-06-17 PROCEDURE — 99213 OFFICE O/P EST LOW 20 MIN: CPT | Performed by: FAMILY MEDICINE

## 2022-06-17 RX ORDER — BENZONATATE 100 MG/1
100 CAPSULE ORAL 3 TIMES DAILY PRN
Qty: 20 CAPSULE | Refills: 1 | Status: SHIPPED | OUTPATIENT
Start: 2022-06-17 | End: 2022-07-05

## 2022-06-17 NOTE — PROGRESS NOTES
Assessment & Plan     Cough  - Symptomatic; Yes; 6/9/2022 COVID-19 Virus (Coronavirus) by PCR Nose  - benzonatate (TESSALON) 100 MG capsule  Dispense: 20 capsule; Refill: 1    Fever, unspecified  - Symptomatic; Yes; 6/9/2022 COVID-19 Virus (Coronavirus) by PCR Nose    History of PSVT (paroxysmal supraventricular tachycardia)/ Palpitations  - Adult Cardiology Eval  Referral    Return if symptoms worsen or fail to improve.    NormaKim Mejía MD  [facility]    Subjective   Wanda is a 52 year old, presenting for the following health issues:  URI (Nasal congestion and coughing for 9 days; is concerned about possible pneumonia; fever of 99 over the weekend, Mucus was green a few days ago but is clearer now but she feels she is not improving. )      HPI     Concern - UR symptoms  Onset: 9 days ago  Description: coughing, heaviness in chest  Intensity: moderate  Progression of Symptoms:  same  Accompanying Signs & Symptoms: fever over weekend, none currently  Previous history of similar problem: pneumonia  Therapies tried and outcome: None, says she cannot take OTC meds for congestion (due to PSVT)    Wanda's has had pneumonia before - diagnosed on exam - and wants to make sure that is not the current issue. Her  symptoms :   - Slight shortness of breath   - cough was no really bad until yesterday - when she gets   - Fatigue/ Run down - but is caring for mom and has 8 years old   -  - Not coughing a ton but she feels she is better    - Trending better since  yesterday    - Had a headache along with fever one day - now gone   - A little sinus congestion - takes allergy medication - Nasonex. Sense of smell is fine   - fully vaccinated against Covid-19   - Took Monday(4 days ago)  off to sleep in    - No sore throat    She has PSVT - feels these have been worse lately      Objective    /72 (BP Location: Left arm, Patient Position: Sitting, Cuff Size: Adult Regular)   Pulse  86   Temp 98.8  F (37.1  C)   SpO2 99%   There is no height or weight on file to calculate BMI.  Physical Exam   General appearance - alert, well appearing, and in no distress  Mental status - normal mood, behavior, speech, dress, motor activity, and thought processes  Eyes - conjunctiva clear  Ears - bilateral TM's and external ear canals normal  Mouth - mucous membranes moist, pharynx normal without lesions and mild posterior erythema  Neck - adenopathy noted small anterior  Chest - clear to auscultation, no wheezes, rales or rhonchi, symmetric air entry  Heart - normal rate, regular rhythm, normal S1, S2, no murmurs, rubs, clicks or gallops            .  ..

## 2022-06-18 LAB — SARS-COV-2 RNA RESP QL NAA+PROBE: NEGATIVE

## 2022-06-30 ASSESSMENT — ENCOUNTER SYMPTOMS
HEADACHES: 0
FREQUENCY: 0
SORE THROAT: 0
DIZZINESS: 0
NAUSEA: 0
ABDOMINAL PAIN: 0
WEAKNESS: 0
EYE PAIN: 0
CONSTIPATION: 0
ARTHRALGIAS: 0
HEMATURIA: 0
DIARRHEA: 0
DYSURIA: 0
NERVOUS/ANXIOUS: 0
PALPITATIONS: 0
FEVER: 0
SHORTNESS OF BREATH: 0
PARESTHESIAS: 0
JOINT SWELLING: 0
CHILLS: 0
HEARTBURN: 0
MYALGIAS: 0
COUGH: 0
BREAST MASS: 0
HEMATOCHEZIA: 0

## 2022-07-05 ENCOUNTER — TELEPHONE (OUTPATIENT)
Dept: FAMILY MEDICINE | Facility: CLINIC | Age: 52
End: 2022-07-05

## 2022-07-05 ENCOUNTER — OFFICE VISIT (OUTPATIENT)
Dept: FAMILY MEDICINE | Facility: CLINIC | Age: 52
End: 2022-07-05
Payer: COMMERCIAL

## 2022-07-05 VITALS
HEART RATE: 78 BPM | HEIGHT: 66 IN | TEMPERATURE: 98 F | OXYGEN SATURATION: 99 % | DIASTOLIC BLOOD PRESSURE: 84 MMHG | BODY MASS INDEX: 22.98 KG/M2 | SYSTOLIC BLOOD PRESSURE: 128 MMHG | WEIGHT: 143 LBS

## 2022-07-05 DIAGNOSIS — G56.03 BILATERAL CARPAL TUNNEL SYNDROME: ICD-10-CM

## 2022-07-05 DIAGNOSIS — Z00.00 ENCOUNTER FOR PREVENTIVE HEALTH EXAMINATION: Primary | ICD-10-CM

## 2022-07-05 DIAGNOSIS — Z23 HIGH PRIORITY FOR COVID-19 VACCINATION: ICD-10-CM

## 2022-07-05 DIAGNOSIS — J45.20 MILD INTERMITTENT ASTHMA WITHOUT COMPLICATION: ICD-10-CM

## 2022-07-05 DIAGNOSIS — Z30.41 ENCOUNTER FOR SURVEILLANCE OF CONTRACEPTIVE PILLS: ICD-10-CM

## 2022-07-05 DIAGNOSIS — Z12.4 CERVICAL CANCER SCREENING: ICD-10-CM

## 2022-07-05 DIAGNOSIS — Z11.59 NEED FOR HEPATITIS C SCREENING TEST: ICD-10-CM

## 2022-07-05 DIAGNOSIS — Z12.31 VISIT FOR SCREENING MAMMOGRAM: ICD-10-CM

## 2022-07-05 DIAGNOSIS — Z12.11 SCREEN FOR COLON CANCER: ICD-10-CM

## 2022-07-05 DIAGNOSIS — J45.20 MILD INTERMITTENT ASTHMA WITHOUT COMPLICATION: Primary | ICD-10-CM

## 2022-07-05 PROCEDURE — 90750 HZV VACC RECOMBINANT IM: CPT | Performed by: NURSE PRACTITIONER

## 2022-07-05 PROCEDURE — 90471 IMMUNIZATION ADMIN: CPT | Performed by: NURSE PRACTITIONER

## 2022-07-05 PROCEDURE — 99213 OFFICE O/P EST LOW 20 MIN: CPT | Mod: 25 | Performed by: NURSE PRACTITIONER

## 2022-07-05 PROCEDURE — 99396 PREV VISIT EST AGE 40-64: CPT | Mod: 25 | Performed by: NURSE PRACTITIONER

## 2022-07-05 PROCEDURE — 0054A COVID-19,PF,PFIZER (12+ YRS): CPT | Performed by: NURSE PRACTITIONER

## 2022-07-05 PROCEDURE — 91305 COVID-19,PF,PFIZER (12+ YRS): CPT | Performed by: NURSE PRACTITIONER

## 2022-07-05 RX ORDER — NORETHINDRONE AND ETHINYL ESTRADIOL 1 MG-35MCG
1 KIT ORAL DAILY
Qty: 84 TABLET | Refills: 4 | Status: SHIPPED | OUTPATIENT
Start: 2022-07-05 | End: 2023-09-21

## 2022-07-05 RX ORDER — ALBUTEROL SULFATE 90 UG/1
2 AEROSOL, METERED RESPIRATORY (INHALATION) EVERY 6 HOURS PRN
Qty: 18 G | Refills: 11 | Status: SHIPPED | OUTPATIENT
Start: 2022-07-05

## 2022-07-05 RX ORDER — BUDESONIDE AND FORMOTEROL FUMARATE DIHYDRATE 80; 4.5 UG/1; UG/1
2 AEROSOL RESPIRATORY (INHALATION) 2 TIMES DAILY
Qty: 10.2 G | Refills: 11 | Status: SHIPPED | OUTPATIENT
Start: 2022-07-05 | End: 2023-01-24

## 2022-07-05 RX ORDER — LEVALBUTEROL TARTRATE 45 UG/1
2 AEROSOL, METERED ORAL EVERY 4 HOURS PRN
Qty: 30 G | Refills: 3 | Status: SHIPPED | OUTPATIENT
Start: 2022-07-05 | End: 2023-01-24

## 2022-07-05 ASSESSMENT — ENCOUNTER SYMPTOMS
ARTHRALGIAS: 0
ABDOMINAL PAIN: 0
DYSURIA: 0
SHORTNESS OF BREATH: 0
NAUSEA: 0
WEAKNESS: 0
EYE PAIN: 0
CONSTIPATION: 0
SORE THROAT: 0
DIZZINESS: 0
HEMATURIA: 0
DIARRHEA: 0
COUGH: 0
HEARTBURN: 0
PARESTHESIAS: 0
MYALGIAS: 0
HEADACHES: 0
PALPITATIONS: 0
CHILLS: 0
JOINT SWELLING: 0
NERVOUS/ANXIOUS: 0
FEVER: 0
HEMATOCHEZIA: 0
FREQUENCY: 0
BREAST MASS: 0

## 2022-07-05 ASSESSMENT — PAIN SCALES - GENERAL: PAINLEVEL: MILD PAIN (3)

## 2022-07-05 ASSESSMENT — ASTHMA QUESTIONNAIRES: ACT_TOTALSCORE: 23

## 2022-07-05 NOTE — PROGRESS NOTES
SUBJECTIVE:   CC: Wanda Orellana is an 52 year old woman who presents for preventive health visit.       Patient has been advised of split billing requirements and indicates understanding: Yes  Healthy Habits:     Getting at least 3 servings of Calcium per day:  Yes    Bi-annual eye exam:  Yes    Dental care twice a year:  Yes    Sleep apnea or symptoms of sleep apnea:  Daytime drowsiness and Excessive snoring    Diet:  Regular (no restrictions)    Frequency of exercise:  2-3 days/week    Duration of exercise:  30-45 minutes    Taking medications regularly:  Yes    Medication side effects:  None    PHQ-2 Total Score: 0    Additional concerns today:  Yes    Colonoscopy scheduled next week  Cardiology scheduled at the end of the month - PSVT had worsened    Periods are starting to space out and having spotting outside of placebo week.  Also having night sweats randomly.  Sheets don't soaked. No vaginal dryness.  Would like refill of BUCK.    Gets night terrors - started in adulthood in past 7 years. Now starting to slow down.    Carpal tunnel has been bad. Wasn't aware of the diclofenac gel prescription.  Hasn't had EMG    Household was sick recently. Wanda was concerned about pneumonia.- cough lasted a long time and Wanda was feeling exhausted. Symptoms have almost entirely controlled.  Covid negative    COVID-19  04/14/2021  1 of 3  Comirnaty-PFR 30mcg  Full    No    COVID-19  05/12/2021  2 of 3  Comirnaty-PFR 30mcg  Full    No    COVID-19  11/30/2021  3 of 3  Comirnaty-PFR 30mcg  Full    No    Had covid 8/2021      Today's PHQ-2 Score:   PHQ-2 ( 1999 Pfizer) 6/30/2022   Q1: Little interest or pleasure in doing things 0   Q2: Feeling down, depressed or hopeless 0   PHQ-2 Score 0   PHQ-2 Total Score (12-17 Years)- Positive if 3 or more points; Administer PHQ-A if positive -   Q1: Little interest or pleasure in doing things Not at all   Q2: Feeling down, depressed or hopeless Not at all   PHQ-2 Score 0       Abuse:  Current or Past (Physical, Sexual or Emotional) - No  Do you feel safe in your environment? Yes    Have you ever done Advance Care Planning? (For example, a Health Directive, POLST, or a discussion with a medical provider or your loved ones about your wishes): No, advance care planning information given to patient to review.  Patient plans to discuss their wishes with loved ones or provider.      Social History     Tobacco Use     Smoking status: Never Smoker     Smokeless tobacco: Never Used   Substance Use Topics     Alcohol use: Yes     Comment: OCCASSIONALLY- 2 PER MONTH     If you drink alcohol do you typically have >3 drinks per day or >7 drinks per week? No    Alcohol Use 7/5/2022   Prescreen: >3 drinks/day or >7 drinks/week? -   Prescreen: >3 drinks/day or >7 drinks/week? No   No flowsheet data found.    Reviewed orders with patient.  Reviewed health maintenance and updated orders accordingly - Yes  Lab work is in process  Labs reviewed in EPIC    Breast Cancer Screening:    FHS-7: No flowsheet data found.  click delete button to remove this line now  Mammogram Screening: Recommended annual mammography  Pertinent mammograms are reviewed under the imaging tab.    History of abnormal Pap smear: NO - age 30-65 PAP every 5 years with negative HPV co-testing recommended  PAP / HPV Latest Ref Rng & Units 3/20/2019 2/10/2016 2/2/2012   PAP (Historical) - OTHER-NIL, See Result NIL NIL   HPV16 NEG:Negative Negative Negative -   HPV18 NEG:Negative Negative Negative -   HRHPV NEG:Negative Negative Negative -     Reviewed and updated as needed this visit by clinical staff   Tobacco  Allergies  Meds   Med Hx  Surg Hx  Fam Hx  Soc Hx        Reviewed and updated as needed this visit by Provider   Tobacco     Med Hx   Fam Hx  Soc Hx           Review of Systems   Constitutional: Negative for chills and fever.   HENT: Negative for congestion, ear pain, hearing loss and sore throat.    Eyes: Negative for pain and  "visual disturbance.   Respiratory: Negative for cough and shortness of breath.    Cardiovascular: Negative for chest pain, palpitations and peripheral edema.   Gastrointestinal: Negative for abdominal pain, constipation, diarrhea, heartburn, hematochezia and nausea.   Breasts:  Negative for tenderness, breast mass and discharge.   Genitourinary: Positive for vaginal discharge. Negative for dysuria, frequency, genital sores, hematuria, pelvic pain, urgency and vaginal bleeding.   Musculoskeletal: Negative for arthralgias, joint swelling and myalgias.   Skin: Negative for rash.   Neurological: Negative for dizziness, weakness, headaches and paresthesias.   Psychiatric/Behavioral: Negative for mood changes. The patient is not nervous/anxious.         OBJECTIVE:   /84   Pulse 78   Temp 98  F (36.7  C) (Temporal)   Ht 1.68 m (5' 6.14\")   Wt 64.9 kg (143 lb)   LMP 07/02/2022   SpO2 99%   BMI 22.98 kg/m    Physical Exam  GENERAL: healthy, alert and no distress  EYES: Eyes grossly normal to inspection, PERRL and conjunctivae and sclerae normal  HENT: ear canals and TM's normal, nose and mouth without ulcers or lesions  NECK: no adenopathy, no asymmetry, masses, or scars and thyroid normal to palpation  RESP: lungs clear to auscultation - no rales, rhonchi or wheezes  BREAST: normal without masses, tenderness or nipple discharge and no palpable axillary masses or adenopathy  CV: regular rate and rhythm, normal S1 S2, no S3 or S4, no murmur, click or rub, no peripheral edema and peripheral pulses strong  ABDOMEN: soft, nontender, no hepatosplenomegaly, no masses and bowel sounds normal  MS: no gross musculoskeletal defects noted, no edema;  strength intact and equal, negative Phalen and Tinel; reproduction of pain on stretch of ulnar and radial   SKIN: no suspicious lesions or rashes other than as noted below with images  NEURO: Normal strength and tone, mentation intact and speech normal  PSYCH: mentation " appears normal, affect normal/bright  LYMPH: no cervical, supraclavicular, axillary, or inguinal adenopathy    Diagnostic Test Results:  Labs reviewed in Epic    1) Left axilla - 1.5 mm black macule  2) right breast lateral edge - 3 mm black slightly raised and irregular nevus  3) right chest below breast - 2 mm annual dark brown macule            ASSESSMENT/PLAN:   (Z00.00) Encounter for preventive health examination  (primary encounter diagnosis)  Comment:   Plan:     (J45.20) Mild intermittent asthma without complication  Comment:   Plan: albuterol (PROAIR HFA/PROVENTIL HFA/VENTOLIN         HFA) 108 (90 Base) MCG/ACT inhaler,         budesonide-formoterol (SYMBICORT) 80-4.5         MCG/ACT Inhaler  Transition to LORRI guideline recommended Symbicort for rescue/control if covered by insurance    (G56.03) Bilateral carpal tunnel syndrome  Comment:   Plan: EMG, diclofenac (VOLTAREN) 1 % topical gel        Suspect more ulnar and radial nerve impingement than CTS. Obtain EMG. Patient instructed on ulnar and radial nerve glides. Can try the diclofenac gel    (Z30.41) Encounter for surveillance of contraceptive pills  Comment:   Plan: norethindrone-ethinyl estradiol (ALAYCEN 1/35)         1-35 MG-MCG tablet        Continue at this time. Still having somewhat regular periods and does not desire pregnancy    (Z12.11) Screen for colon cancer  Comment:   Plan: Discussed screening schedule    (Z11.59) Need for hepatitis C screening test  Comment:   Plan: Hepatitis C Screen Reflex to HCV RNA Quant and         Genotype            (Z12.31) Visit for screening mammogram  Comment:   Plan: Due - adivsed patient o schedule    (Z12.4) Cervical cancer screening  Comment:   Plan: CANCELED: Pap Screen with HPV - recommended age        30 - 65 years  Pap not indicated until 2024    (Z23) High priority for COVID-19 vaccination  Comment:   Plan: COVID-19,PF,PFIZER (12+ Yrs GRAY LABEL)              Patient has been advised of split billing  "requirements and indicates understanding: Yes    COUNSELING:  Reviewed preventive health counseling, as reflected in patient instructions    Estimated body mass index is 22.98 kg/m  as calculated from the following:    Height as of this encounter: 1.68 m (5' 6.14\").    Weight as of this encounter: 64.9 kg (143 lb).        She reports that she has never smoked. She has never used smokeless tobacco.      Counseling Resources:  ATP IV Guidelines  Pooled Cohorts Equation Calculator  Breast Cancer Risk Calculator  BRCA-Related Cancer Risk Assessment: FHS-7 Tool  FRAX Risk Assessment  ICSI Preventive Guidelines  Dietary Guidelines for Americans, 2010  USDA's MyPlate  ASA Prophylaxis  Lung CA Screening    Carol Bahena, MARY JO [facility]  "

## 2022-07-05 NOTE — PATIENT INSTRUCTIONS
"For your back/arms/hands  Schedule the EMG  Stretch chest daily - firm roller along your spine (head supported) and passive stretch of chest  Massage the knots in your back - either the rollerball attached to the wall or a tennis ball  Look up ulnar and radial nerve glides  Try voltargen gel (diclofenac)    Look at [facility] for possibly cheaper voltaren gel    For asthma - trial rx of Symbicort which is what I prefer for your rescue medicine.  If too expensive or not covered, let me know so I can order the albuterol    Schedule mammogram    Set up Hep C lab    1.\"Eat food.  Not too much.  Mostly plants\" - Jone Pollen - see link below  - Aim for 5-7 servings of vegetables (raw vegetables - 1 serving = 1/2 cup; raw greens - 1 serving = 1 cup)   - Eat grains, but don't make them the superstar of your meal and DO make them whole grains  2. AVOID sugar.  There is no nutritional benefit to sugar.  3. Drink lots of water (avoid juice and soda)  4. MOVE your body daily - even if some days this means 5 minutes of movement. Walking is great for your bones and brain.  Do aim for 150 minutes per week of activity that raises your heart rate, but \"don't let the ideal get in the way of the good enough\"  5. Get good sleep (6-8 hours/night- less sleep is associated with disease/illness/obesity)   6. Smile and laugh every day - even in the face of tragedy  7. Start a meditation or mindfulness practice    CALCIUM: The average recommended intake for women is 5288-2170 mg calcium daily. It is best to get this from your diet (Milk, yogurt, and cheese, vegetables such as Chinese cabbage, kale, spinach and broccoli). If you are not eating enough calcium, then I recommend Calcium Citrate/vitD supplements daily.     Vitamin D is an essential nutritient that many [place] lack, especially in the winter. It is vital for healthy immune system functioning and can be linked to diseases such as obesity, diabetes, body aches/pain, etc. " "It is super safe and highly beneficial for a [place] to take a vitamin D3 2000 IU once daily during winter months. Daily vitamin D for life is recommended for anyone who has ever been found deficient.    Other things to consider: do not drive while under the influence of alcohol, do not drive while texting, always wear a seatbelt, wear a helmet when biking, wear sunscreen to help prevent skin cancer, use DEET mosquito spray when outdoors to prevent mosquito and tick bites, & avoid smoking. If you do get bit by a DEER tick, please contact my office immediately to consider lyme prophylaxis. Dental visits recommended every 6-12 months.    Jone Mccracken's 7 Rules for Eating  https://www.[employer]istle/food-recipes/news/71924538/7-rules-for-eating#1    My clinic hours are as follows:  Monday virtual appointments 12-2p Tuesday in clinic appts 9a-5p  Thurs video visits 7-9 am  If you need an appointment urgently and do not find one available on [employer] or with Central scheduling, please send me a [employer] message and I will work to get you scheduled with myself or a colleague here     Clinic notes  Lab and imaging results are now available for you to view immediately on completion.  Please know that I often have not seen the result before you see results.  I will interpret and discuss the results and meaning of the results as soon as I am able to review them.   [employer] is the best way to reach the clinic directly.  When you send a [employer] message this will be read by our clinic RNs.  Please know that when you call the clinic number, you are not speaking to a staff member from our local clinic.  You can ask that staff to speak to a clinic staff directly if you wish.  You will be able to read my documentation (\"provider notes\") when I finish up and close your chart.  I encourage you to read through to understand your diagnosis and the plan and how we arrived there.  It is also an opportunity to make sure I fully understood " your concerns.

## 2022-07-05 NOTE — Clinical Note
Hi Dr. Lau, I just am seeing Wanda today for preventative and noticed three relatively black moles.  She is seeing you in August 26th for skin check and I just wanted to run the photos by you to see if she should be seen any sooner than that. She does not recall them from childhood. There are no symptoms.  Thank you, KATHERIN Santos

## 2022-07-06 ENCOUNTER — TELEPHONE (OUTPATIENT)
Dept: FAMILY MEDICINE | Facility: CLINIC | Age: 52
End: 2022-07-06

## 2022-07-06 NOTE — TELEPHONE ENCOUNTER
Central Prior Authorization Team   Phone: [phone]      PRIOR AUTHORIZATION DENIED    Medication: diclofenac (VOLTAREN) 1 % topical gel - EPA DENIED     Denial Date: 7/6/2022    Denial Rational: Coverage for this medication is denied for the following reason(s). We reviewed the  information we received about your condition and circumstances. We used the plan  approved policy when making this decision. The policy states the requested drug may  be covered when it is used for osteoarthritis pain in joints susceptible to topical  treatment, such as feet, ankles, knees, hands, wrists, or elbows. Based on the policy  and the information we received, your request was denied. The request was denied  because it is not being used for osteoarthritis pain in a joint susceptible to topical  treatment.          Appeal Information: If the Provider/Patient would like to appeal this denial, please provide a letter of medical necessity explaining why Preferred Formulary medications are not appropriate in the treatment of the patient's condition; along with any previous therapies tried/failed.    Once completed, please route back to me directly: Virginia Fischer

## 2022-07-07 ENCOUNTER — TELEPHONE (OUTPATIENT)
Dept: GASTROENTEROLOGY | Facility: CLINIC | Age: 52
End: 2022-07-07

## 2022-07-07 DIAGNOSIS — Z11.59 ENCOUNTER FOR SCREENING FOR OTHER VIRAL DISEASES: Primary | ICD-10-CM

## 2022-07-07 NOTE — TELEPHONE ENCOUNTER
Pre assessment questions completed for upcoming colonoscopy procedure scheduled on 7/13/22    Covid test scheduled 7/9/22. Order placed.    Reviewed procedural arrival time 0730 and facility location [facility].    Designated  policy reviewed. Instructed to have someone stay 6 hours post procedure.     Procedure indication: screening    Bowel prep recommendation: Miralax/Magnesium citrate/Dulcolax     Reviewed Miralax prep instructions with patient. No fiber/iron supplements or foods that contain nuts/seeds 7 days prior to procedure.     Patient verbalized understanding and had no questions or concerns at this time.    Miriam Sharpe RN

## 2022-07-09 ENCOUNTER — LAB (OUTPATIENT)
Dept: LAB | Facility: CLINIC | Age: 52
End: 2022-07-09
Payer: COMMERCIAL

## 2022-07-09 DIAGNOSIS — Z11.59 ENCOUNTER FOR SCREENING FOR OTHER VIRAL DISEASES: ICD-10-CM

## 2022-07-09 DIAGNOSIS — Z11.59 NEED FOR HEPATITIS C SCREENING TEST: ICD-10-CM

## 2022-07-09 LAB — SARS-COV-2 RNA RESP QL NAA+PROBE: NEGATIVE

## 2022-07-09 PROCEDURE — U0005 INFEC AGEN DETEC AMPLI PROBE: HCPCS | Performed by: PATHOLOGY

## 2022-07-09 PROCEDURE — U0003 INFECTIOUS AGENT DETECTION BY NUCLEIC ACID (DNA OR RNA); SEVERE ACUTE RESPIRATORY SYNDROME CORONAVIRUS 2 (SARS-COV-2) (CORONAVIRUS DISEASE [COVID-19]), AMPLIFIED PROBE TECHNIQUE, MAKING USE OF HIGH THROUGHPUT TECHNOLOGIES AS DESCRIBED BY CMS-2020-01-R: HCPCS | Performed by: PATHOLOGY

## 2022-07-09 PROCEDURE — 86803 HEPATITIS C AB TEST: CPT | Performed by: PATHOLOGY

## 2022-07-09 PROCEDURE — 99000 SPECIMEN HANDLING OFFICE-LAB: CPT | Performed by: PATHOLOGY

## 2022-07-09 PROCEDURE — 36415 COLL VENOUS BLD VENIPUNCTURE: CPT | Performed by: PATHOLOGY

## 2022-07-11 LAB — HCV AB SERPL QL IA: NONREACTIVE

## 2022-07-13 ENCOUNTER — ANESTHESIA EVENT (OUTPATIENT)
Dept: SURGERY | Facility: AMBULATORY SURGERY CENTER | Age: 52
End: 2022-07-13
Payer: COMMERCIAL

## 2022-07-13 ENCOUNTER — HOSPITAL ENCOUNTER (OUTPATIENT)
Facility: AMBULATORY SURGERY CENTER | Age: 52
Discharge: HOME | End: 2022-07-13
Attending: INTERNAL MEDICINE
Payer: COMMERCIAL

## 2022-07-13 ENCOUNTER — ANESTHESIA (OUTPATIENT)
Dept: SURGERY | Facility: AMBULATORY SURGERY CENTER | Age: 52
End: 2022-07-13
Payer: COMMERCIAL

## 2022-07-13 VITALS
BODY MASS INDEX: 22.44 KG/M2 | RESPIRATION RATE: 16 BRPM | HEART RATE: 86 BPM | TEMPERATURE: 97 F | WEIGHT: 143 LBS | DIASTOLIC BLOOD PRESSURE: 70 MMHG | HEIGHT: 67 IN | SYSTOLIC BLOOD PRESSURE: 102 MMHG | OXYGEN SATURATION: 99 %

## 2022-07-13 VITALS — HEART RATE: 82 BPM

## 2022-07-13 LAB — COLONOSCOPY: NORMAL

## 2022-07-13 PROCEDURE — 88305 TISSUE EXAM BY PATHOLOGIST: CPT | Mod: 26 | Performed by: PATHOLOGY

## 2022-07-13 PROCEDURE — 88305 TISSUE EXAM BY PATHOLOGIST: CPT | Mod: TC | Performed by: INTERNAL MEDICINE

## 2022-07-13 PROCEDURE — 45380 COLONOSCOPY AND BIOPSY: CPT | Mod: 33

## 2022-07-13 RX ORDER — NALOXONE HYDROCHLORIDE 0.4 MG/ML
0.4 INJECTION, SOLUTION INTRAMUSCULAR; INTRAVENOUS; SUBCUTANEOUS
Status: DISCONTINUED | OUTPATIENT
Start: 2022-07-13 | End: 2022-07-14 | Stop reason: HOSPADM

## 2022-07-13 RX ORDER — PROPOFOL 10 MG/ML
INJECTION, EMULSION INTRAVENOUS PRN
Status: DISCONTINUED | OUTPATIENT
Start: 2022-07-13 | End: 2022-07-13

## 2022-07-13 RX ORDER — ONDANSETRON 2 MG/ML
4 INJECTION INTRAMUSCULAR; INTRAVENOUS EVERY 6 HOURS PRN
Status: DISCONTINUED | OUTPATIENT
Start: 2022-07-13 | End: 2022-07-14 | Stop reason: HOSPADM

## 2022-07-13 RX ORDER — SODIUM CHLORIDE, SODIUM LACTATE, POTASSIUM CHLORIDE, CALCIUM CHLORIDE 600; 310; 30; 20 MG/100ML; MG/100ML; MG/100ML; MG/100ML
INJECTION, SOLUTION INTRAVENOUS CONTINUOUS PRN
Status: DISCONTINUED | OUTPATIENT
Start: 2022-07-13 | End: 2022-07-13

## 2022-07-13 RX ORDER — NALOXONE HYDROCHLORIDE 0.4 MG/ML
0.2 INJECTION, SOLUTION INTRAMUSCULAR; INTRAVENOUS; SUBCUTANEOUS
Status: DISCONTINUED | OUTPATIENT
Start: 2022-07-13 | End: 2022-07-14 | Stop reason: HOSPADM

## 2022-07-13 RX ORDER — LIDOCAINE 40 MG/G
CREAM TOPICAL
Status: DISCONTINUED | OUTPATIENT
Start: 2022-07-13 | End: 2022-07-13 | Stop reason: HOSPADM

## 2022-07-13 RX ORDER — ONDANSETRON 4 MG/1
4 TABLET, ORALLY DISINTEGRATING ORAL EVERY 6 HOURS PRN
Status: DISCONTINUED | OUTPATIENT
Start: 2022-07-13 | End: 2022-07-14 | Stop reason: HOSPADM

## 2022-07-13 RX ORDER — ONDANSETRON 2 MG/ML
4 INJECTION INTRAMUSCULAR; INTRAVENOUS
Status: DISCONTINUED | OUTPATIENT
Start: 2022-07-13 | End: 2022-07-13 | Stop reason: HOSPADM

## 2022-07-13 RX ORDER — FLUMAZENIL 0.1 MG/ML
0.2 INJECTION, SOLUTION INTRAVENOUS
Status: ACTIVE | OUTPATIENT
Start: 2022-07-13 | End: 2022-07-13

## 2022-07-13 RX ORDER — PROCHLORPERAZINE MALEATE 10 MG
10 TABLET ORAL EVERY 6 HOURS PRN
Status: DISCONTINUED | OUTPATIENT
Start: 2022-07-13 | End: 2022-07-14 | Stop reason: HOSPADM

## 2022-07-13 RX ORDER — PROPOFOL 10 MG/ML
INJECTION, EMULSION INTRAVENOUS CONTINUOUS PRN
Status: DISCONTINUED | OUTPATIENT
Start: 2022-07-13 | End: 2022-07-13

## 2022-07-13 RX ORDER — LIDOCAINE HYDROCHLORIDE 20 MG/ML
INJECTION, SOLUTION INFILTRATION; PERINEURAL PRN
Status: DISCONTINUED | OUTPATIENT
Start: 2022-07-13 | End: 2022-07-13

## 2022-07-13 RX ADMIN — LIDOCAINE HYDROCHLORIDE 60 MG: 20 INJECTION, SOLUTION INFILTRATION; PERINEURAL at 08:46

## 2022-07-13 RX ADMIN — SODIUM CHLORIDE, SODIUM LACTATE, POTASSIUM CHLORIDE, CALCIUM CHLORIDE: 600; 310; 30; 20 INJECTION, SOLUTION INTRAVENOUS at 08:40

## 2022-07-13 RX ADMIN — PROPOFOL 150 MCG/KG/MIN: 10 INJECTION, EMULSION INTRAVENOUS at 08:47

## 2022-07-13 RX ADMIN — PROPOFOL 60 MG: 10 INJECTION, EMULSION INTRAVENOUS at 08:47

## 2022-07-13 RX ADMIN — PROPOFOL 40 MG: 10 INJECTION, EMULSION INTRAVENOUS at 08:57

## 2022-07-13 NOTE — ANESTHESIA POSTPROCEDURE EVALUATION
Patient: Wanda Orellana    Procedure: Procedure(s):  COLONOSCOPY, WITH POLYPECTOMY       Anesthesia Type:  MAC    Note:  Disposition: Outpatient   Postop Pain Control: Uneventful            Sign Out: Well controlled pain   PONV: No   Neuro/Psych: Uneventful            Sign Out: Acceptable/Baseline neuro status   Airway/Respiratory: Uneventful            Sign Out: Acceptable/Baseline resp. status   CV/Hemodynamics: Uneventful            Sign Out: Acceptable CV status; No obvious hypovolemia; No obvious fluid overload   Other NRE: NONE   DID A NON-ROUTINE EVENT OCCUR? No           Last vitals:  Vitals Value Taken Time   /70 07/13/22 0945   Temp 36.1  C (97  F) 07/13/22 0945   Pulse 86 07/13/22 0945   Resp 16 07/13/22 0945   SpO2 99 % 07/13/22 0945       Electronically Signed By: Toi Adhikari MD, MD  July 13, 2022  2:10 PM

## 2022-07-13 NOTE — ANESTHESIA PREPROCEDURE EVALUATION
Anesthesia Pre-Procedure Evaluation    Patient: Wanda Orellana   MRN: [id number] : 1970        Procedure : Procedure(s):  COLONOSCOPY          Past Medical History:   Diagnosis Date     Fracture of metatarsal bone(s), closed     foreign body in foot ?needle?     Headaches      Mild intermittent asthma     exercise induced     Seasonal allergies       Past Surgical History:   Procedure Laterality Date     DILATION AND CURETTAGE SUCTION  2012    Procedure:DILATION AND CURETTAGE SUCTION; Suction Dilation & Curettage   (11 weeks); Surgeon:CHESTER ALVAREZ; Location:[facility]     EYE SURGERY      laser surg w/ complications of infections, etc     WISDOM TEETH[        Allergies   Allergen Reactions     Asa [Aspirin]      Aspirin Buffered Other (See Comments)     Mouth and tongue swelling     Codeine       Social History     Tobacco Use     Smoking status: Never Smoker     Smokeless tobacco: Never Used   Substance Use Topics     Alcohol use: Yes     Comment: OCCASSIONALLY- 2 PER MONTH      Wt Readings from Last 1 Encounters:   22 64.9 kg (143 lb)        Anesthesia Evaluation            ROS/MED HX  ENT/Pulmonary:  - neg pulmonary ROS     Neurologic:  - neg neurologic ROS     Cardiovascular:  - neg cardiovascular ROS     METS/Exercise Tolerance:     Hematologic:  - neg hematologic  ROS     Musculoskeletal:  - neg musculoskeletal ROS     GI/Hepatic:     (+) GERD, Asymptomatic on medication,     Renal/Genitourinary:  - neg Renal ROS     Endo:  - neg endo ROS     Psychiatric/Substance Use:  - neg psychiatric ROS     Infectious Disease:  - neg infectious disease ROS     Malignancy:  - neg malignancy ROS     Other:               OUTSIDE LABS:  CBC:   Lab Results   Component Value Date    WBC 5.2 2020    WBC 8.7 2018    HGB 14.0 2020    HGB 13.2 2018    HCT 42.8 2020    HCT 39.6 2018     2020     2018     BMP:   Lab Results   Component Value  Date     04/25/2022     09/30/2020    POTASSIUM 4.0 04/25/2022    POTASSIUM 4.4 09/30/2020    CHLORIDE 106 04/25/2022    CHLORIDE 108 09/30/2020    CO2 24 04/25/2022    CO2 25 09/30/2020    BUN 9 04/25/2022    BUN 9 09/30/2020    CR 0.75 04/25/2022    CR 0.78 09/30/2020    GLC 83 04/25/2022    GLC 83 09/30/2020     COAGS: No results found for: PTT, INR, FIBR  POC:   Lab Results   Component Value Date    HCG Positive (A) 01/11/2013     HEPATIC:   Lab Results   Component Value Date    ALBUMIN 3.9 04/25/2022    PROTTOTAL 8.2 04/25/2022    ALT 23 04/25/2022    AST 18 04/25/2022    ALKPHOS 48 04/25/2022    BILITOTAL 0.5 04/25/2022     OTHER:   Lab Results   Component Value Date    CORDELL 9.3 04/25/2022    TSH 2.60 04/25/2022    T4 1.36 12/15/2014    CRP 5.5 09/30/2020       Anesthesia Plan    ASA Status:  1      Anesthesia Type: MAC.     - Reason for MAC: immobility needed              Consents    Anesthesia Plan(s) and associated risks, benefits, and realistic alternatives discussed. Questions answered and patient/representative(s) expressed understanding.     - Discussed: Risks, Benefits and Alternatives for BOTH SEDATION and the PROCEDURE were discussed     - Discussed with:  Patient      - Extended Intubation/Ventilatory Support Discussed: No.      - Patient is DNR/DNI Status: No    Use of blood products discussed: No .     Postoperative Care    Pain management: IV analgesics, Oral pain medications.        Comments:                Toi Adhikari MD, MD

## 2022-07-13 NOTE — ANESTHESIA CARE TRANSFER NOTE
Patient: Wanda Orellana    Procedure: Procedure(s):  COLONOSCOPY, WITH POLYPECTOMY       Diagnosis: Special screening for malignant neoplasms, colon [Z12.11]  Diagnosis Additional Information: No value filed.    Anesthesia Type:   MAC     Note:    Oropharynx: oropharynx clear of all foreign objects and spontaneously breathing  Level of Consciousness: awake  Oxygen Supplementation: room air    Independent Airway: airway patency satisfactory and stable  Dentition: dentition unchanged  Vital Signs Stable: post-procedure vital signs reviewed and stable  Report to RN Given: handoff report given  Patient transferred to: Phase II    Handoff Report: Identifed the Patient, Identified the Reponsible Provider, Reviewed the pertinent medical history, Discussed the surgical course, Reviewed Intra-OP anesthesia mangement and issues during anesthesia, Set expectations for post-procedure period and Allowed opportunity for questions and acknowledgement of understanding      Vitals:  Vitals Value Taken Time   /72    Temp 97    Pulse 80    Resp 16    SpO2 100        Electronically Signed By: BRIAN SMALL  July 13, 2022  9:21 AM

## 2022-07-14 LAB
PATH REPORT.COMMENTS IMP SPEC: NORMAL
PATH REPORT.COMMENTS IMP SPEC: NORMAL
PATH REPORT.FINAL DX SPEC: NORMAL
PATH REPORT.GROSS SPEC: NORMAL
PATH REPORT.MICROSCOPIC SPEC OTHER STN: NORMAL
PATH REPORT.RELEVANT HX SPEC: NORMAL
PHOTO IMAGE: NORMAL

## 2022-07-25 NOTE — H&P
Wanda KELLY Reyna  [id number]  female  52 year old      Reason for procedure/surgery: Screening    Patient Active Problem List   Diagnosis     GERD (gastroesophageal reflux disease)     AMA (advanced maternal age) primigravida 35+     Mucous polyp of cervix     CARDIOVASCULAR SCREENING; LDL GOAL LESS THAN 160     Mild intermittent asthma     Congenital uterine anomaly     H/O paroxysmal supraventricular tachycardia     Chronic seasonal allergic rhinitis due to other allergen       Past Surgical History:    Past Surgical History:   Procedure Laterality Date     COLONOSCOPY N/A 7/13/2022    Procedure: COLONOSCOPY, WITH POLYPECTOMY;  Surgeon: Alvino Chamberlain MD;  Location: [facility] OR     DILATION AND CURETTAGE SUCTION  2/24/2012    Procedure:DILATION AND CURETTAGE SUCTION; Suction Dilation & Curettage   (11 weeks); Surgeon:CHESTER ALVAREZ; Location:[facility]     EYE SURGERY  2002    laser surg w/ complications of infections, etc     WISDOM TEETH[         Past Medical History:   Past Medical History:   Diagnosis Date     Fracture of metatarsal bone(s), closed 11/09    foreign body in foot ?needle?     Headaches      Mild intermittent asthma 2011    exercise induced     Seasonal allergies        Social History:   Social History     Tobacco Use     Smoking status: Never Smoker     Smokeless tobacco: Never Used   Substance Use Topics     Alcohol use: Yes     Comment: OCCASSIONALLY- 2 PER MONTH       Family History:   Family History   Problem Relation Age of Onset     Diabetes Mother      Arthritis Mother      Heart Failure Mother      Lipids Mother      Obesity Mother      Eye Disorder Mother         retinal myopathy     Hypertension Mother      Kidney Disease Mother      Asthma Mother      Respiratory Mother         emphysema-smoker     Unknown/Adopted Father      Diabetes Maternal Grandmother      Arthritis Maternal Grandmother      Unknown/Adopted Paternal Grandmother      Unknown/Adopted Paternal Grandfather      Asthma  "Son      Alcohol/Drug Maternal Aunt        Allergies:   Allergies   Allergen Reactions     Asa [Aspirin]      Aspirin Buffered Other (See Comments)     Mouth and tongue swelling     Codeine        Active Medications:   Current Outpatient Medications   Medication Sig Dispense Refill     albuterol (PROAIR HFA/PROVENTIL HFA/VENTOLIN HFA) 108 (90 Base) MCG/ACT inhaler Inhale 2 puffs into the lungs every 6 hours as needed for shortness of breath / dyspnea or wheezing 18 g 11     budesonide-formoterol (SYMBICORT) 80-4.5 MCG/ACT Inhaler Inhale 2 puffs into the lungs 2 times daily 10.2 g 11     Cetirizine HCl (ZYRTEC ALLERGY PO) Take 1 tablet by mouth daily       Cholecalciferol (VITAMIN D) 2000 UNIT tablet Take 1 tablet by mouth daily       fluticasone (FLONASE) 50 MCG/ACT nasal spray Spray 1-2 sprays into both nostrils daily 48 g 1     multivitamin, therapeutic with minerals (THERA-VIT-M) TABS Take 1 tablet by mouth daily       norethindrone-ethinyl estradiol (ALAYCEN 1/35) 1-35 MG-MCG tablet Take 1 tablet by mouth daily 84 tablet 4     vitamin E 400 UNIT TABS Take 400 Units by mouth daily.       diclofenac (VOLTAREN) 1 % topical gel Apply 2 g topically 4 times daily 100 g 0     levalbuterol (XOPENEX HFA) 45 MCG/ACT inhaler Inhale 2 puffs into the lungs every 4 hours as needed for shortness of breath / dyspnea or wheezing 30 g 3       Systemic Review:   CONSTITUTIONAL: NEGATIVE for fever, chills, change in weight  ENT/MOUTH: NEGATIVE for ear, mouth and throat problems  RESP: NEGATIVE for significant cough or SOB  CV: NEGATIVE for chest pain, palpitations or peripheral edema    Physical Examination:   Vital Signs: /70   Pulse 86   Temp 97  F (36.1  C) (Temporal)   Resp 16   Ht 1.689 m (5' 6.5\")   Wt 64.9 kg (143 lb)   LMP 07/02/2022   SpO2 99%   BMI 22.74 kg/m    GENERAL: healthy, alert and no distress  NECK: no adenopathy, no asymmetry, masses, or scars  RESP: lungs clear to auscultation - no rales, rhonchi " or wheezes  CV: regular rate and rhythm, normal S1 S2, no S3 or S4, no murmur, click or rub, no peripheral edema and peripheral pulses strong  ABDOMEN: soft, nontender, no hepatosplenomegaly, no masses and bowel sounds normal  MS: no gross musculoskeletal defects noted, no edema    Plan: Appropriate to proceed as scheduled.      Alvino Chamberlain MD  7/24/2022    PCP:  Wanda Mejía

## 2022-07-29 ENCOUNTER — OFFICE VISIT (OUTPATIENT)
Dept: CARDIOLOGY | Facility: CLINIC | Age: 52
End: 2022-07-29
Attending: INTERNAL MEDICINE
Payer: COMMERCIAL

## 2022-07-29 VITALS
BODY MASS INDEX: 23.43 KG/M2 | OXYGEN SATURATION: 100 % | DIASTOLIC BLOOD PRESSURE: 86 MMHG | HEIGHT: 67 IN | SYSTOLIC BLOOD PRESSURE: 134 MMHG | WEIGHT: 149.3 LBS | HEART RATE: 83 BPM

## 2022-07-29 DIAGNOSIS — I47.10 PAROXYSMAL SUPRAVENTRICULAR TACHYCARDIA (H): Primary | ICD-10-CM

## 2022-07-29 PROCEDURE — G0463 HOSPITAL OUTPT CLINIC VISIT: HCPCS | Mod: 25

## 2022-07-29 PROCEDURE — 93005 ELECTROCARDIOGRAM TRACING: CPT

## 2022-07-29 PROCEDURE — 99205 OFFICE O/P NEW HI 60 MIN: CPT | Performed by: INTERNAL MEDICINE

## 2022-07-29 ASSESSMENT — PAIN SCALES - GENERAL: PAINLEVEL: NO PAIN (0)

## 2022-07-29 NOTE — NURSING NOTE
Chief Complaint   Patient presents with     New Patient     Hx of SVT per PCP      Vitals were taken and medications were reconciled. EKG was performed   JACK Hector  3:12 PM

## 2022-07-29 NOTE — PATIENT INSTRUCTIONS
You were seen in the Electrophysiology Clinic today by: Dr Blake    Plan:     Follow up visit:  As needed with Dr Blake          Your Care Team:  [facility]   Telephone Number     Nurse Line  Opal Gomez RN  [phone]     For scheduling appts:   Blank    For procedures:    Tamanna Quintanilla   [phone]   For the Device Clinic (Pacemakers, ICDs, Loop Recorders)    During business hours: [phone]  After business hours:   [phone]- select option 4 and ask for job code [id number].     On-call cardiologist for after hours or on weekends: [phone], option #4, and ask to speak to the on-call cardiologist.     Cardiovascular Clinic:   [address] [place] [address]. [place] [address]      As always, Thank you for trusting us with your health care needs!

## 2022-07-29 NOTE — LETTER
7/29/2022      RE: Wanda Orellana  [address]  [facility] [address]       Dear Colleague,    Thank you for the opportunity to participate in the care of your patient, Wanda Orellana, at the [facility] [place] at [facility]. Please see a copy of my visit note below.    HPI:   Wanda Orellana is a 53 yo Female with a PMH of Ex-induced asthma, COVID infection in 8/2021 and SVT.  She was referred for a cardiology evaluation.  She started having SVT several years ago and it has recently started happening more often.  She has visited the ED once because SVT lasted for a couple of hours.  At that time she was diagnosed with SVT.  Her SVT presented with a sudden onset and offset.  Her SVT is happening less than twice a week and lasts for a couple of min.  Vagal maneuvers usually work.  She denied any specific triggers of SVT.  She denied any chest pain or dizziness but complained of SOB and palpitation.    PAST MEDICAL HISTORY:  Past Medical History:   Diagnosis Date     Fracture of metatarsal bone(s), closed 11/09    foreign body in foot ?needle?     Headaches      Mild intermittent asthma 2011    exercise induced     Seasonal allergies        CURRENT MEDICATIONS:  Current Outpatient Medications   Medication Sig Dispense Refill     albuterol (PROAIR HFA/PROVENTIL HFA/VENTOLIN HFA) 108 (90 Base) MCG/ACT inhaler Inhale 2 puffs into the lungs every 6 hours as needed for shortness of breath / dyspnea or wheezing 18 g 11     budesonide-formoterol (SYMBICORT) 80-4.5 MCG/ACT Inhaler Inhale 2 puffs into the lungs 2 times daily 10.2 g 11     Cetirizine HCl (ZYRTEC ALLERGY PO) Take 1 tablet by mouth daily       Cholecalciferol (VITAMIN D) 2000 UNIT tablet Take 1 tablet by mouth daily       diclofenac (VOLTAREN) 1 % topical gel Apply 2 g topically 4 times daily 100 g 0     fluticasone (FLONASE) 50 MCG/ACT nasal spray Spray 1-2 sprays into both nostrils daily 48 g 1      levalbuterol (XOPENEX HFA) 45 MCG/ACT inhaler Inhale 2 puffs into the lungs every 4 hours as needed for shortness of breath / dyspnea or wheezing 30 g 3     multivitamin, therapeutic with minerals (THERA-VIT-M) TABS Take 1 tablet by mouth daily       norethindrone-ethinyl estradiol (ALAYCEN 1/35) 1-35 MG-MCG tablet Take 1 tablet by mouth daily 84 tablet 4     vitamin E 400 UNIT TABS Take 400 Units by mouth daily.         PAST SURGICAL HISTORY:  Past Surgical History:   Procedure Laterality Date     COLONOSCOPY N/A 7/13/2022    Procedure: COLONOSCOPY, WITH POLYPECTOMY;  Surgeon: Alvino Chamberlain MD;  Location: [facility] OR     DILATION AND CURETTAGE SUCTION  2/24/2012    Procedure:DILATION AND CURETTAGE SUCTION; Suction Dilation & Curettage   (11 weeks); Surgeon:CHESTER ALVAREZ; [facility]     EYE SURGERY  2002    laser surg w/ complications of infections, etc     WISDOM TEETH[         ALLERGIES:     Allergies   Allergen Reactions     Asa [Aspirin]      Aspirin Buffered Other (See Comments)     Mouth and tongue swelling     Codeine        FAMILY HISTORY:  - Premature coronary artery disease  - Atrial fibrillation  - Sudden cardiac death     SOCIAL HISTORY:  Social History     Tobacco Use     Smoking status: Never Smoker     Smokeless tobacco: Never Used   Substance Use Topics     Alcohol use: Yes     Comment: OCCASSIONALLY- 2 PER MONTH     Drug use: No       ROS:   Constitutional: No fever, chills, or sweats. Weight stable.   ENT: No visual disturbance, ear ache, epistaxis, sore throat.   Cardiovascular: As per HPI.   Respiratory: No cough, hemoptysis.    GI: No nausea, vomiting, hematemesis, melena, or hematochezia.   : No hematuria.   Integument: Negative.   Psychiatric: Negative.   Hematologic:  Easy bruising, no easy bleeding.  Neuro: Negative.   Endocrinology: No significant heat or cold intolerance   Musculoskeletal: No myalgia.    Exam:  [facility] 07/02/2022   GENERAL APPEARANCE: healthy, alert and no  distress  HEENT: no icterus, no xanthelasmas, normal pupil size and reaction, normal palate, mucosa moist, no central cyanosis  NECK: no adenopathy, no asymmetry, masses, or scars, thyroid normal to palpation and no bruits, JVP not elevated  RESPIRATORY: lungs clear to auscultation - no rales, rhonchi or wheezes, no use of accessory muscles, no retractions, respirations are unlabored, normal respiratory rate  CARDIOVASCULAR: regular rhythm, normal S1 with physiologic split S2, no S3 or S4 and no murmur, click or rub, precordium quiet with normal PMI.  ABDOMEN: soft, non tender, without hepatosplenomegaly, no masses palpable, bowel sounds normal, aorta not enlarged by palpation, no abdominal bruits  EXTREMITIES: peripheral pulses normal, no edema, no bruits  NEURO: alert and oriented to person/place/time, normal speech, gait and affect  VASC: Radial, femoral, dorsalis pedis and posterior tibialis pulses are normal in volumes and symmetric bilaterally. No bruits are heard.  SKIN: no ecchymoses, no rashes    Labs:  CBC RESULTS:   Lab Results   Component Value Date    WBC 5.2 09/30/2020    RBC 4.59 09/30/2020    HGB 14.0 09/30/2020    HCT 42.8 09/30/2020    MCV 93 09/30/2020    MCH 30.5 09/30/2020    MCHC 32.7 09/30/2020    RDW 11.9 09/30/2020     09/30/2020       BMP RESULTS:  Lab Results   Component Value Date     04/25/2022     09/30/2020    POTASSIUM 4.0 04/25/2022    POTASSIUM 4.4 09/30/2020    CHLORIDE 106 04/25/2022    CHLORIDE 108 09/30/2020    CO2 24 04/25/2022    CO2 25 09/30/2020    ANIONGAP 7 04/25/2022    ANIONGAP 6 09/30/2020    GLC 83 04/25/2022    GLC 83 09/30/2020    BUN 9 04/25/2022    BUN 9 09/30/2020    CR 0.75 04/25/2022    CR 0.78 09/30/2020    GFRESTIMATED >90 04/25/2022    GFRESTIMATED 88 09/30/2020    GFRESTBLACK >90 09/30/2020    CORDELL 9.3 04/25/2022    CORDELL 9.2 09/30/2020        INR RESULTS:  No results found for: INR    Procedures:  ECG on 7/29/2022: Reviewed.      Assessment  and Plan:   # Ex-induced asthma  # COVID infection in 8/2021  # SVT  She started having SVT several years ago and it has recently started happening more often.  She has visited the ED once because SVT lasted for a couple of hours.  At that time she was diagnosed with SVT.  Her SVT presented with a sudden onset and offset.  Her SVT is happening less than twice a week and lasts for a couple of min.  Vagal maneuvers usually work.  Therefore, she can tolerate SVT at this point.  We discussed treatment options including observation, medication and catheter ablation.The nature, risks, benefits, alternatives and anticipated results of the procedure were explained to the patient. These risks include but are not limited to local vascular damage, bleeding, pulmonary vein stenosis, AV block requiring a pacemaker implantation, tamponade and stroke.  She would like to observe at this point and will let us know if SVT gets worse.  No regular follow up will be required.    I spent a total of 60 min today to review the records, see the patient, and complete the documents.    CC  Patient Care Team:  Amanda Wilson MD as PCP - General (Family Medicine)  Amanda Wilson MD as Assigned PCP  Opal Gomez, RN as Specialty Care Coordinator  Shailesh Blake MD (Cardiovascular Disease)  AMANDA WILSON        Please do not hesitate to contact me if you have any questions/concerns.     Sincerely,     Shailesh Blake MD

## 2022-07-29 NOTE — PROGRESS NOTES
HPI:   Wanda Orellana is a 53 yo Female with a PMH of Ex-induced asthma, COVID infection in 8/2021 and SVT.  She was referred for a cardiology evaluation.  She started having SVT several years ago and it has recently started happening more often.  She has visited the ED once because SVT lasted for a couple of hours.  At that time she was diagnosed with SVT.  Her SVT presented with a sudden onset and offset.  Her SVT is happening less than twice a week and lasts for a couple of min.  Vagal maneuvers usually work.  She denied any specific triggers of SVT.  She denied any chest pain or dizziness but complained of SOB and palpitation.    PAST MEDICAL HISTORY:  Past Medical History:   Diagnosis Date     Fracture of metatarsal bone(s), closed 11/09    foreign body in foot ?needle?     Headaches      Mild intermittent asthma 2011    exercise induced     Seasonal allergies        CURRENT MEDICATIONS:  Current Outpatient Medications   Medication Sig Dispense Refill     albuterol (PROAIR HFA/PROVENTIL HFA/VENTOLIN HFA) 108 (90 Base) MCG/ACT inhaler Inhale 2 puffs into the lungs every 6 hours as needed for shortness of breath / dyspnea or wheezing 18 g 11     budesonide-formoterol (SYMBICORT) 80-4.5 MCG/ACT Inhaler Inhale 2 puffs into the lungs 2 times daily 10.2 g 11     Cetirizine HCl (ZYRTEC ALLERGY PO) Take 1 tablet by mouth daily       Cholecalciferol (VITAMIN D) 2000 UNIT tablet Take 1 tablet by mouth daily       diclofenac (VOLTAREN) 1 % topical gel Apply 2 g topically 4 times daily 100 g 0     fluticasone (FLONASE) 50 MCG/ACT nasal spray Spray 1-2 sprays into both nostrils daily 48 g 1     levalbuterol (XOPENEX HFA) 45 MCG/ACT inhaler Inhale 2 puffs into the lungs every 4 hours as needed for shortness of breath / dyspnea or wheezing 30 g 3     multivitamin, therapeutic with minerals (THERA-VIT-M) TABS Take 1 tablet by mouth daily       norethindrone-ethinyl estradiol (ALAYCEN 1/35) 1-35 MG-MCG tablet Take 1 tablet by  mouth daily 84 tablet 4     vitamin E 400 UNIT TABS Take 400 Units by mouth daily.         PAST SURGICAL HISTORY:  Past Surgical History:   Procedure Laterality Date     COLONOSCOPY N/A 7/13/2022    Procedure: COLONOSCOPY, WITH POLYPECTOMY;  Surgeon: Alvino Chamberlain MD;  [place] [facility] [place]     DILATION AND CURETTAGE SUCTION  2/24/2012    Procedure:DILATION AND CURETTAGE SUCTION; Suction Dilation & Curettage   (11 weeks); Surgeon:CHESTER ALVAREZ; Location:[facility]     EYE SURGERY  2002    laser surg w/ complications of infections, etc     WISDOM TEETH[         ALLERGIES:     Allergies   Allergen Reactions     Asa [Aspirin]      Aspirin Buffered Other (See Comments)     Mouth and tongue swelling     Codeine        FAMILY HISTORY:  - Premature coronary artery disease  - Atrial fibrillation  - Sudden cardiac death     SOCIAL HISTORY:  Social History     Tobacco Use     Smoking status: Never Smoker     Smokeless tobacco: Never Used   Substance Use Topics     Alcohol use: Yes     Comment: OCCASSIONALLY- 2 PER MONTH     Drug use: No       ROS:   Constitutional: No fever, chills, or sweats. Weight stable.   ENT: No visual disturbance, ear ache, epistaxis, sore throat.   Cardiovascular: As per HPI.   Respiratory: No cough, hemoptysis.    GI: No nausea, vomiting, hematemesis, melena, or hematochezia.   : No hematuria.   Integument: Negative.   Psychiatric: Negative.   Hematologic:  Easy bruising, no easy bleeding.  Neuro: Negative.   Endocrinology: No significant heat or cold intolerance   Musculoskeletal: No myalgia.    Exam:  [facility] 07/02/2022   GENERAL APPEARANCE: healthy, alert and no distress  HEENT: no icterus, no xanthelasmas, normal pupil size and reaction, normal palate, mucosa moist, no central cyanosis  NECK: no adenopathy, no asymmetry, masses, or scars, thyroid normal to palpation and no bruits, JVP not elevated  RESPIRATORY: lungs clear to auscultation - no rales, rhonchi or wheezes, no use of accessory  muscles, no retractions, respirations are unlabored, normal respiratory rate  CARDIOVASCULAR: regular rhythm, normal S1 with physiologic split S2, no S3 or S4 and no murmur, click or rub, precordium quiet with normal PMI.  ABDOMEN: soft, non tender, without hepatosplenomegaly, no masses palpable, bowel sounds normal, aorta not enlarged by palpation, no abdominal bruits  EXTREMITIES: peripheral pulses normal, no edema, no bruits  NEURO: alert and oriented to person/place/time, normal speech, gait and affect  VASC: Radial, femoral, dorsalis pedis and posterior tibialis pulses are normal in volumes and symmetric bilaterally. No bruits are heard.  SKIN: no ecchymoses, no rashes    Labs:  CBC RESULTS:   Lab Results   Component Value Date    WBC 5.2 09/30/2020    RBC 4.59 09/30/2020    HGB 14.0 09/30/2020    HCT 42.8 09/30/2020    MCV 93 09/30/2020    MCH 30.5 09/30/2020    MCHC 32.7 09/30/2020    RDW 11.9 09/30/2020     09/30/2020       BMP RESULTS:  Lab Results   Component Value Date     04/25/2022     09/30/2020    POTASSIUM 4.0 04/25/2022    POTASSIUM 4.4 09/30/2020    CHLORIDE 106 04/25/2022    CHLORIDE 108 09/30/2020    CO2 24 04/25/2022    CO2 25 09/30/2020    ANIONGAP 7 04/25/2022    ANIONGAP 6 09/30/2020    GLC 83 04/25/2022    GLC 83 09/30/2020    BUN 9 04/25/2022    BUN 9 09/30/2020    CR 0.75 04/25/2022    CR 0.78 09/30/2020    GFRESTIMATED >90 04/25/2022    GFRESTIMATED 88 09/30/2020    GFRESTBLACK >90 09/30/2020    CORDELL 9.3 04/25/2022    CORDELL 9.2 09/30/2020        INR RESULTS:  No results found for: INR    Procedures:  ECG on 7/29/2022: Reviewed.      Assessment and Plan:   # Ex-induced asthma  # COVID infection in 8/2021  # SVT  She started having SVT several years ago and it has recently started happening more often.  She has visited the ED once because SVT lasted for a couple of hours.  At that time she was diagnosed with SVT.  Her SVT presented with a sudden onset and offset.  Her SVT is  happening less than twice a week and lasts for a couple of min.  Vagal maneuvers usually work.  Therefore, she can tolerate SVT at this point.  We discussed treatment options including observation, medication and catheter ablation.The nature, risks, benefits, alternatives and anticipated results of the procedure were explained to the patient. These risks include but are not limited to local vascular damage, bleeding, pulmonary vein stenosis, AV block requiring a pacemaker implantation, tamponade and stroke.  She would like to observe at this point and will let us know if SVT gets worse.  No regular follow up will be required.    I spent a total of 60 min today to review the records, see the patient, and complete the documents.    CC  Patient Care Team:  Amanda Wilson MD as PCP - General (Family Medicine)  Amanda Wilson MD as Assigned PCP  Opal Gomez, RN as Specialty Care Coordinator  Shailesh Blake MD (Cardiovascular Disease)  AMANDA WILSON

## 2022-07-31 LAB
ATRIAL RATE - MUSE: 77 BPM
DIASTOLIC BLOOD PRESSURE - MUSE: NORMAL MMHG
INTERPRETATION ECG - MUSE: NORMAL
P AXIS - MUSE: 53 DEGREES
PR INTERVAL - MUSE: 146 MS
QRS DURATION - MUSE: 68 MS
QT - MUSE: 380 MS
QTC - MUSE: 430 MS
R AXIS - MUSE: 64 DEGREES
SYSTOLIC BLOOD PRESSURE - MUSE: NORMAL MMHG
T AXIS - MUSE: 48 DEGREES
VENTRICULAR RATE- MUSE: 77 BPM

## 2022-08-26 ENCOUNTER — OFFICE VISIT (OUTPATIENT)
Dept: DERMATOLOGY | Facility: CLINIC | Age: 52
End: 2022-08-26
Payer: COMMERCIAL

## 2022-08-26 DIAGNOSIS — L81.4 SOLAR LENTIGO: ICD-10-CM

## 2022-08-26 DIAGNOSIS — D18.01 CHERRY ANGIOMA: ICD-10-CM

## 2022-08-26 DIAGNOSIS — D22.9 MULTIPLE BENIGN NEVI: Primary | ICD-10-CM

## 2022-08-26 DIAGNOSIS — L82.1 SEBORRHEIC KERATOSIS: ICD-10-CM

## 2022-08-26 PROCEDURE — 99202 OFFICE O/P NEW SF 15 MIN: CPT | Performed by: DERMATOLOGY

## 2022-08-26 ASSESSMENT — PAIN SCALES - GENERAL: PAINLEVEL: NO PAIN (0)

## 2022-08-26 NOTE — LETTER
8/26/2022       RE: Wanda Orellana  [address]  [facility] [address]     Dear Colleague,    Thank you for referring your patient, Wanda Orellana, to the [facility] DERMATOLOGY [facility] [place] at [facility]. Please see a copy of my visit note below.    [facility] Dermatology Note  Encounter Date: Aug 26, 2022  Office Visit     Dermatology Problem List:  # Had near fainting with prior outside biopsy    Soc hx: I also see her mom Lisette.  ____________________________________________    Assessment & Plan:    # Cherry angiomas  # Seborrheic keratoses  - Reassured of benign nature, no treatment necessary    # Multiple benign nevi  # Solar lentigines   - No concerning lesions today  - Monitor for ABCDEs of melanoma   - Continue sun protection - recommend SPF 30 or higher with frequent application   - Return sooner if noticing changing or symptomatic lesions     Procedures Performed:   None      Follow-up: 1 year(s) in-person, or earlier for new or changing lesions    Staff and Scribe:     Scribe Disclosure:  I, Zurdo Traylor, am serving as a scribe to document services personally performed by Kelya Lau MD based on data collection and the provider's statements to me.     Provider Disclosure:   The documentation recorded by the scribe accurately reflects the services I personally performed and the decisions made by me.    Keyla Lau MD    Department of Dermatology  [facility]: Phone: [phone], Fax: [phone]  8/27/2022     ____________________________________________    CC: Skin Check (Few spots of concern.)    HPI:  Ms. Wanda Orellana is a(n) 52 year old female who presents today as a new patient for FBSE. Patient reports a few spots of concern that she would like examined. No personal history of skin cancer. No history of  tanning bed use. Sun exposure from gardening. Wears sunscreen when spending time outdoors.    Patient is otherwise feeling well, without additional skin concerns.    Labs Reviewed:  N/A    Physical Exam:  Vitals: LMP 07/02/2022   SKIN: Total skin excluding the undergarment areas was performed. The exam included the head/face, neck, both arms, chest, back, abdomen, both legs, digits and/or nails.   - There are dome shaped bright red papules on the trunk and extremities.   - Multiple regular brown pigmented macules and papules are identified on the trunk and extremities.   - Scattered brown macules on sun exposed areas.  - There are waxy stuck on tan to brown papules on the trunk and extremities.  - No other lesions of concern on areas examined.     Medications:  Current Outpatient Medications   Medication     Cetirizine HCl (ZYRTEC ALLERGY PO)     levalbuterol (XOPENEX HFA) 45 MCG/ACT inhaler     multivitamin, therapeutic with minerals (THERA-VIT-M) TABS     norethindrone-ethinyl estradiol (ALAYCEN 1/35) 1-35 MG-MCG tablet     vitamin E 400 UNIT TABS     albuterol (PROAIR HFA/PROVENTIL HFA/VENTOLIN HFA) 108 (90 Base) MCG/ACT inhaler     budesonide-formoterol (SYMBICORT) 80-4.5 MCG/ACT Inhaler     Cholecalciferol (VITAMIN D) 2000 UNIT tablet     diclofenac (VOLTAREN) 1 % topical gel     fluticasone (FLONASE) 50 MCG/ACT nasal spray     Current Facility-Administered Medications   Medication     rabies immune globulin 300 units/mL (HYPERAB) injection 1,260 Units      Past Medical History:   Patient Active Problem List   Diagnosis     GERD (gastroesophageal reflux disease)     AMA (advanced maternal age) primigravida 35+     Mucous polyp of cervix     CARDIOVASCULAR SCREENING; LDL GOAL LESS THAN 160     Mild intermittent asthma     Congenital uterine anomaly     H/O paroxysmal supraventricular tachycardia     Chronic seasonal allergic rhinitis due to other allergen     Past Medical History:   Diagnosis Date     Fracture  of metatarsal bone(s), closed 11/09    foreign body in foot ?needle?     Headaches      Mild intermittent asthma 2011    exercise induced     Seasonal allergies         CC Carol Bahena, APRN CNP  [address]  [place] [address] on close of this encounter.

## 2022-08-26 NOTE — PROGRESS NOTES
[facility] Dermatology Note  Encounter Date: Aug 26, 2022  Office Visit     Dermatology Problem List:  # Had near fainting with prior outside biopsy    Soc hx: I also see her mom Lisette.  ____________________________________________    Assessment & Plan:    # Cherry angiomas  # Seborrheic keratoses  - Reassured of benign nature, no treatment necessary    # Multiple benign nevi  # Solar lentigines   - No concerning lesions today  - Monitor for ABCDEs of melanoma   - Continue sun protection - recommend SPF 30 or higher with frequent application   - Return sooner if noticing changing or symptomatic lesions     Procedures Performed:   None      Follow-up: 1 year(s) in-person, or earlier for new or changing lesions    Staff and Scribe:     Scribe Disclosure:  I, Zurdo Traylor, am serving as a scribe to document services personally performed by Keyla Lau MD based on data collection and the provider's statements to me.     Provider Disclosure:   The documentation recorded by the scribe accurately reflects the services I personally performed and the decisions made by me.    Keyla Lau MD    Department of Dermatology  [facility]: Phone: [phone], Fax: [phone]  8/27/2022     ____________________________________________    CC: Skin Check (Few spots of concern.)    HPI:  Ms. Wanda Orellana is a(n) 52 year old female who presents today as a new patient for FBSE. Patient reports a few spots of concern that she would like examined. No personal history of skin cancer. No history of tanning bed use. Sun exposure from gardening. Wears sunscreen when spending time outdoors.    Patient is otherwise feeling well, without additional skin concerns.    Labs Reviewed:  N/A    Physical Exam:  Vitals: LMP 07/02/2022   SKIN: Total skin excluding the undergarment areas was performed. The exam included  the head/face, neck, both arms, chest, back, abdomen, both legs, digits and/or nails.   - There are dome shaped bright red papules on the trunk and extremities.   - Multiple regular brown pigmented macules and papules are identified on the trunk and extremities.   - Scattered brown macules on sun exposed areas.  - There are waxy stuck on tan to brown papules on the trunk and extremities.  - No other lesions of concern on areas examined.     Medications:  Current Outpatient Medications   Medication     Cetirizine HCl (ZYRTEC ALLERGY PO)     levalbuterol (XOPENEX HFA) 45 MCG/ACT inhaler     multivitamin, therapeutic with minerals (THERA-VIT-M) TABS     norethindrone-ethinyl estradiol (ALAYCEN 1/35) 1-35 MG-MCG tablet     vitamin E 400 UNIT TABS     albuterol (PROAIR HFA/PROVENTIL HFA/VENTOLIN HFA) 108 (90 Base) MCG/ACT inhaler     budesonide-formoterol (SYMBICORT) 80-4.5 MCG/ACT Inhaler     Cholecalciferol (VITAMIN D) 2000 UNIT tablet     diclofenac (VOLTAREN) 1 % topical gel     fluticasone (FLONASE) 50 MCG/ACT nasal spray     Current Facility-Administered Medications   Medication     rabies immune globulin 300 units/mL (HYPERAB) injection 1,260 Units      Past Medical History:   Patient Active Problem List   Diagnosis     GERD (gastroesophageal reflux disease)     AMA (advanced maternal age) primigravida 35+     Mucous polyp of cervix     CARDIOVASCULAR SCREENING; LDL GOAL LESS THAN 160     Mild intermittent asthma     Congenital uterine anomaly     H/O paroxysmal supraventricular tachycardia     Chronic seasonal allergic rhinitis due to other allergen     Past Medical History:   Diagnosis Date     Fracture of metatarsal bone(s), closed 11/09    foreign body in foot ?needle?     Headaches      Mild intermittent asthma 2011    exercise induced     Seasonal allergies         CC Carol Bahena, APRN CNP  [address]  [place] [address] on close of this encounter.

## 2022-08-26 NOTE — NURSING NOTE
Dermatology Rooming Note    Wanda Orellana's goals for this visit include:   Chief Complaint   Patient presents with     Skin Check     Few spots of concern.     Belkis Winters, CMA

## 2022-08-26 NOTE — PATIENT INSTRUCTIONS
Patient Education     Checking for Skin Cancer  You can find cancer early by checking your skin each month. There are 3 kinds of skin cancer. They are melanoma, basal cell carcinoma, and squamous cell carcinoma. Doing monthly skin checks is the best way to find new marks or skin changes. Follow the instructions below for checking your skin.   The ABCDEs of checking moles for melanoma   Check your moles or growths for signs of melanoma using ABCDE:   Asymmetry: the sides of the mole or growth don t match  Border: the edges are ragged, notched, or blurred  Color: the color within the mole or growth varies  Diameter: the mole or growth is larger than 6 mm (size of a pencil eraser)  Evolving: the size, shape, or color of the mole or growth is changing (evolving is not shown in the images below)    Checking for other types of skin cancer  Basal cell carcinoma or squamous cell carcinoma have symptoms such as:     A spot or mole that looks different from all other marks on your skin  Changes in how an area feels, such as itching, tenderness, or pain  Changes in the skin's surface, such as oozing, bleeding, or scaliness  A sore that does not heal  New swelling or redness beyond the border of a mole    Who s at risk?  Anyone can get skin cancer. But you are at greater risk if you have:   Fair skin, light-colored hair, or light-colored eyes  Many moles or abnormal moles on your skin  A history of sunburns from sunlight or tanning beds  A family history of skin cancer  A history of exposure to radiation or chemicals  A weakened immune system  If you have had skin cancer in the past, you are at risk for recurring skin cancer.   How to check your skin  Do your monthly skin checkups in front of a full-length mirror. Check all parts of your body, including your:   Head (ears, face, neck, and scalp)  Torso (front, back, and sides)  Arms (tops, undersides, upper, and lower armpits)  Hands (palms, backs, and fingers, including  under the nails)  Buttocks and genitals  Legs (front, back, and sides)  Feet (tops, soles, toes, including under the nails, and between toes)  If you have a lot of moles, take digital photos of them each month. Make sure to take photos both up close and from a distance. These can help you see if any moles change over time.   Most skin changes are not cancer. But if you see any changes in your skin, call your doctor right away. Only he or she can diagnose a problem. If you have skin cancer, seeing your doctor can be the first step toward getting the treatment that could save your life.   [employer] last reviewed this educational content on 4/1/2019 2000-2020 [employer]. [address] [place] [address], [place] [address]. All rights reserved. This information is not intended as a substitute for professional medical care. Always follow your healthcare professional's instructions.       When should I call my doctor?  If you are worsening or not improving, please, contact us or seek urgent care as noted below.     Who should I call with questions (adults)?  [facility] (adult and pediatric): [phone]  [facility] (adult): [phone]  For urgent needs outside of business hours call the [facility] at [phone] and ask for the dermatology resident on call to be paged  If this is a medical emergency and you are unable to reach an ER, Call [phone]    Who should I call with questions (pediatric)?  [facility]- Pediatric Dermatology  Dr. Beverly Rodríguez, Dr. Ac Daniels, Dr. Marleny Rodriguez, LUIS ANTONIO Bradshaw, Dr. Petty Winkler, Dr. Puja Chew & Dr. Gerson Farrell  Non-urgent nurse triage line; [phone]- More and Diann TODD Care Coordinatorrosa Harding (/Complex ) [phone]    If you need a prescription refill, please contact your pharmacy. Refills are approved or denied by our  Physicians during normal business hours, Monday through Fridays  Per office policy, refills will not be granted if you have not been seen within the past year (or sooner depending on your child's condition)    Scheduling Information:  Pediatric Appointment Scheduling and Call Center [phone]  Radiology Scheduling- [phone]  Sedation Unit Scheduling- [phone]  [place] Scheduling- General [phone]; Pediatric Dermatology [phone]  Main  Services: [phone]  Mohawk: [phone]  Taiwanese: [phone]  Hmong/Congolese/Lao: [phone]  Preadmission Nursing Department Fax Number: [phone] (Fax all pre-operative paperwork to this number)    For urgent matters arising during evenings, weekends, or holidays that cannot wait for normal business hours please call [phone] and ask for the dermatology resident on call to be paged.

## 2022-09-07 ENCOUNTER — OFFICE VISIT (OUTPATIENT)
Dept: NEUROLOGY | Facility: CLINIC | Age: 52
End: 2022-09-07
Attending: NURSE PRACTITIONER
Payer: COMMERCIAL

## 2022-09-07 DIAGNOSIS — G56.03 BILATERAL CARPAL TUNNEL SYNDROME: ICD-10-CM

## 2022-09-07 PROCEDURE — 95913 NRV CNDJ TEST 13/> STUDIES: CPT | Performed by: PSYCHIATRY & NEUROLOGY

## 2022-09-07 PROCEDURE — 95885 MUSC TST DONE W/NERV TST LIM: CPT | Performed by: PSYCHIATRY & NEUROLOGY

## 2022-09-07 NOTE — LETTER
2022     RE: Wanda Orellana  [address]  [facility] [address]     Dear Colleague,    Thank you for referring your patient, Wanda Orellana, to the [facility] EMG CLINIC [place] at [facility]. Please see a copy of my visit note below.                        [facility]  Electrodiagnostic Laboratory                 Department of Neurology                                                                                                         Test Date:  2022    Patient: Wanda Orellana : 1970 Physician: Prem Dotson MD   Sex: Female AGE: 52 year Ref Phys: Carol Shruti NP   ID#: [id number]   Technician: Thai Sethi     Clinical Information:  52 year old woman with intermittent numbness and pain in her hands. Symptoms are provoked by activities like writing, keyboarding, and cycling. Evaluate for focal neuropathies in the hands.     Techniques:  Motor and sensory conduction studies were done with surface recording electrodes. EMG was done with a concentric needle electrode.     Results:  Nerve conduction studies:  1. Bilateral median-D2, ulnar-D5, and radial sensory responses are normal.   2. Bilateral median-ulnar palmar interlatency differences are prolonged.   3. Bilateral median-APB and ulnar-ADM motor responses are normal.   4. Bilateral median-lumbrical vs ulnar-interosseous latency differences are prolonged.     Needle EMG of selected distal right and left upper limb muscles was performed as tabulated below. No abnormal spontaneous activity was observed in the sampled muscles. Motor unit potential morphology and recruitment patterns were normal.     Interpretation:  This is an abnormal study. There is electrophysiologic evidence of very mild median neuropathies at the wrist on both sides, as can be seen in the context of bilateral carpal tunnel syndrome. Clinical correlation is recommended.     Prem Dotson MD  Department of  Neurology      Nerve Conduction Studies  Motor Sites      Latency Amplitude Neg. Amp Diff Segment Distance Velocity Neg. Dur Neg Area Diff Temperature Comment   Site (ms) Norm (mV) Norm %  cm m/s Norm ms %  C    Left Median (APB) Motor   Wrist 3.4  < 4.4 9.9  > 5.0  Wrist-APB 8   4.4  36.5    Elbow 7.0 - 7.1 - -28.3 Elbow-Wrist 21.8 61  > 48 4.7 -25.7 36.3    Right Median (APB) Motor   Wrist 3.6  < 4.4 8.9  > 5.0  Wrist-APB 8   4.2  33.6    Elbow 7.2 - 8.8 - -1.12 Elbow-Wrist 21.5 60  > 48 4.2 -3.5 33.6    Left Median/Ulnar (Lumb-Dors Int II) Motor        Median (Lumb I)   Wrist 3.4 - 1.20 -      7.3  33.2         Ulnar (Dorsal Int (manus))   Wrist 2.5 - 3.4 -      4.5  33.2    Right Median/Ulnar (Lumb-Dors Int II) Motor        Median (Lumb I)   Wrist 3.5 - 1.54 -      6.4  33.7         Ulnar (Dorsal Int (manus))   Wrist 2.3 - 3.8 -      4.9  33.7    Left Ulnar (ADM) Motor   Wrist 2.7  < 3.5 11.9  > 5.0  Wrist-ADM 8   4.6  34.5    Bel Elbow 5.0 - 11.4 - -4.2 Bel Elbow-Wrist 16.3 71  > 48 4.9 -1.05 34.5    Abv Elbow 6.5 - 10.8 - -5.3 Abv Elbow-Bel Elbow 9.5 63  > 48 5.1 -1.41 34.4    Right Ulnar (ADM) Motor   Wrist 2.4  < 3.5 12.1  > 5.0  Wrist-ADM 8   4.4  33.1    Bel Elbow 5.5 - 11.0 - -9.1 Bel Elbow-Wrist 19 61  > 48 4.5 -2.3 33.1    Abv Elbow 7.0 - 10.3 - -6.4 Abv Elbow-Bel Elbow 8.8 59  > 48 4.7 -1.17 33      Sensory Sites      Onset Lat Peak Lat Amp (O-P) Amp (P-P) Segment Distance Velocity Temperature Comment   Site ms ms  V Norm  V  cm m/s Norm  C    Left Median Sensory   Wrist-Dig II 2.7 3.4 24  > 10 36 Wrist-Dig II 14 52  > 48 34.4    Right Median Sensory   Wrist-Dig II 2.6 3.4 20  > 10 29 Wrist-Dig II 14 54  > 48 33.3    Left Median-Ulnar Palmar Sensory        Median   Palm-Wrist 1.68 2.2 18 - 38 Palm-Wrist - - - 36         Ulnar   Palm-Wrist 1.10 1.70 26 - 30 Palm-Wrist - - - 36.3    Right Median-Ulnar Palmar Sensory        Median   Palm-Wrist 1.93 2.5 28 - 37 Palm-Wrist - - - 33.9         Ulnar    Palm-Wrist 1.25 1.78 24 - 40 Palm-Wrist - - - 33.8    Left Radial Sensory   Forearm-Wrist 1.43 2.0 35  > 15 36 Forearm-Wrist 10 70 - 33.1    Right Radial Sensory   Forearm-Wrist 1.53 2.1 35  > 15 34 Forearm-Wrist 10 65 - 32.6    Left Ulnar Sensory   Wrist-Dig V 2.0 2.6 35  > 8 117 Wrist-Dig V 12.5 63  > 48 35.1    Right Ulnar Sensory   Wrist-Dig V 1.93 2.6 41  > 8 24 Wrist-Dig V 12.5 65  > 48 33.7      Inter-Nerve Comparisons     Nerve 1 Value 1 Nerve 2 Value 2 Parameter Result Normal   Sensory Sites   R Median Palm-Wrist 2.5 ms R Ulnar Palm-Wrist 1.8 ms Peak Lat Diff 0.72 ms <0.40   L Median Palm-Wrist 2.2 ms L Ulnar Palm-Wrist 1.7 ms Peak Lat Diff 0.50 ms <0.40       Electromyography     Side Muscle Ins Act Fibs/PSW Fasc HF Amp Dur Poly Recrt Int Pat   Left FDI Nml None Nml 0 Nml Nml 0 Nml Nml   Left APB Nml None Nml 0 Nml Nml 0 Nml Nml   Right FDI Nml None Nml 0 Nml Nml 0 Nml Nml         NCS Waveforms:    Motor                    Sensory                           Again, thank you for allowing me to participate in the care of your patient.      Sincerely,    Prem Dotson MD

## 2022-09-07 NOTE — PROGRESS NOTES
[facility]  Electrodiagnostic Laboratory                 Department of Neurology                                                                                                         Test Date:  2022    Patient: Wanda Orellana : 1970 Physician: Prem Dotson MD   Sex: Female AGE: 52 year Ref Phys: Carol Bahena NP   ID#: [id number]   Technician: Thai Sethi     Clinical Information:  52 year old woman with intermittent numbness and pain in her hands. Symptoms are provoked by activities like writing, keyboarding, and cycling. Evaluate for focal neuropathies in the hands.     Techniques:  Motor and sensory conduction studies were done with surface recording electrodes. EMG was done with a concentric needle electrode.     Results:  Nerve conduction studies:  1. Bilateral median-D2, ulnar-D5, and radial sensory responses are normal.   2. Bilateral median-ulnar palmar interlatency differences are prolonged.   3. Bilateral median-APB and ulnar-ADM motor responses are normal.   4. Bilateral median-lumbrical vs ulnar-interosseous latency differences are prolonged.     Needle EMG of selected distal right and left upper limb muscles was performed as tabulated below. No abnormal spontaneous activity was observed in the sampled muscles. Motor unit potential morphology and recruitment patterns were normal.     Interpretation:  This is an abnormal study. There is electrophysiologic evidence of very mild median neuropathies at the wrist on both sides, as can be seen in the context of bilateral carpal tunnel syndrome. Clinical correlation is recommended.     Prem Dotson MD  Department of Neurology      Nerve Conduction Studies  Motor Sites      Latency Amplitude Neg. Amp Diff Segment Distance Velocity Neg. Dur Neg Area Diff Temperature Comment   Site (ms) Norm (mV) Norm %  cm m/s Norm ms %  C    Left Median (APB) Motor   Wrist 3.4  < 4.4 9.9  > 5.0  Wrist-APB 8   4.4  36.5    Elbow  7.0 - 7.1 - -28.3 Elbow-Wrist 21.8 61  > 48 4.7 -25.7 36.3    Right Median (APB) Motor   Wrist 3.6  < 4.4 8.9  > 5.0  Wrist-APB 8   4.2  33.6    Elbow 7.2 - 8.8 - -1.12 Elbow-Wrist 21.5 60  > 48 4.2 -3.5 33.6    Left Median/Ulnar (Lumb-Dors Int II) Motor        Median (Lumb I)   Wrist 3.4 - 1.20 -      7.3  33.2         Ulnar (Dorsal Int (manus))   Wrist 2.5 - 3.4 -      4.5  33.2    Right Median/Ulnar (Lumb-Dors Int II) Motor        Median (Lumb I)   Wrist 3.5 - 1.54 -      6.4  33.7         Ulnar (Dorsal Int (manus))   Wrist 2.3 - 3.8 -      4.9  33.7    Left Ulnar (ADM) Motor   Wrist 2.7  < 3.5 11.9  > 5.0  Wrist-ADM 8   4.6  34.5    Bel Elbow 5.0 - 11.4 - -4.2 Bel Elbow-Wrist 16.3 71  > 48 4.9 -1.05 34.5    Abv Elbow 6.5 - 10.8 - -5.3 Abv Elbow-Bel Elbow 9.5 63  > 48 5.1 -1.41 34.4    Right Ulnar (ADM) Motor   Wrist 2.4  < 3.5 12.1  > 5.0  Wrist-ADM 8   4.4  33.1    Bel Elbow 5.5 - 11.0 - -9.1 Bel Elbow-Wrist 19 61  > 48 4.5 -2.3 33.1    Abv Elbow 7.0 - 10.3 - -6.4 Abv Elbow-Bel Elbow 8.8 59  > 48 4.7 -1.17 33      Sensory Sites      Onset Lat Peak Lat Amp (O-P) Amp (P-P) Segment Distance Velocity Temperature Comment   Site ms ms  V Norm  V  cm m/s Norm  C    Left Median Sensory   Wrist-Dig II 2.7 3.4 24  > 10 36 Wrist-Dig II 14 52  > 48 34.4    Right Median Sensory   Wrist-Dig II 2.6 3.4 20  > 10 29 Wrist-Dig II 14 54  > 48 33.3    Left Median-Ulnar Palmar Sensory        Median   Palm-Wrist 1.68 2.2 18 - 38 Palm-Wrist - - - 36         Ulnar   Palm-Wrist 1.10 1.70 26 - 30 Palm-Wrist - - - 36.3    Right Median-Ulnar Palmar Sensory        Median   Palm-Wrist 1.93 2.5 28 - 37 Palm-Wrist - - - 33.9         Ulnar   Palm-Wrist 1.25 1.78 24 - 40 Palm-Wrist - - - 33.8    Left Radial Sensory   Forearm-Wrist 1.43 2.0 35  > 15 36 Forearm-Wrist 10 70 - 33.1    Right Radial Sensory   Forearm-Wrist 1.53 2.1 35  > 15 34 Forearm-Wrist 10 65 - 32.6    Left Ulnar Sensory   Wrist-Dig V 2.0 2.6 35  > 8 117 Wrist-Dig V 12.5 63  > 48  35.1    Right Ulnar Sensory   Wrist-Dig V 1.93 2.6 41  > 8 24 Wrist-Dig V 12.5 65  > 48 33.7      Inter-Nerve Comparisons     Nerve 1 Value 1 Nerve 2 Value 2 Parameter Result Normal   Sensory Sites   R Median Palm-Wrist 2.5 ms R Ulnar Palm-Wrist 1.8 ms Peak Lat Diff 0.72 ms <0.40   L Median Palm-Wrist 2.2 ms L Ulnar Palm-Wrist 1.7 ms Peak Lat Diff 0.50 ms <0.40       Electromyography     Side Muscle Ins Act Fibs/PSW Fasc HF Amp Dur Poly Recrt Int Pat   Left FDI Nml None Nml 0 Nml Nml 0 Nml Nml   Left APB Nml None Nml 0 Nml Nml 0 Nml Nml   Right FDI Nml None Nml 0 Nml Nml 0 Nml Nml         NCS Waveforms:    Motor                    Sensory

## 2022-09-09 ENCOUNTER — MYC MEDICAL ADVICE (OUTPATIENT)
Dept: FAMILY MEDICINE | Facility: CLINIC | Age: 52
End: 2022-09-09

## 2022-09-09 DIAGNOSIS — G56.03 BILATERAL CARPAL TUNNEL SYNDROME: Primary | ICD-10-CM

## 2022-09-12 PROBLEM — G56.03 BILATERAL CARPAL TUNNEL SYNDROME: Status: ACTIVE | Noted: 2022-09-12

## 2022-10-04 ENCOUNTER — ALLIED HEALTH/NURSE VISIT (OUTPATIENT)
Dept: FAMILY MEDICINE | Facility: CLINIC | Age: 52
End: 2022-10-04
Payer: COMMERCIAL

## 2022-10-04 DIAGNOSIS — Z23 NEED FOR COVID-19 VACCINE: Primary | ICD-10-CM

## 2022-10-04 DIAGNOSIS — Z23 NEED FOR VACCINATION: Primary | ICD-10-CM

## 2022-10-04 PROCEDURE — 90682 RIV4 VACC RECOMBINANT DNA IM: CPT

## 2022-10-04 PROCEDURE — 91312 COVID-19,PF,PFIZER BOOSTER BIVALENT: CPT

## 2022-10-04 PROCEDURE — 90471 IMMUNIZATION ADMIN: CPT

## 2022-10-04 PROCEDURE — 0124A COVID-19,PF,PFIZER BOOSTER BIVALENT: CPT

## 2022-10-04 PROCEDURE — 99207 PR NO CHARGE NURSE ONLY: CPT

## 2022-10-04 NOTE — PROGRESS NOTES
.Prior to immunization administration, verified patients identity using patient s name and date of birth. Please see Immunization Activity for additional information.     Screening Questionnaire for Adult Immunization    Are you sick today?   No   Do you have allergies to medications, food, a vaccine component or latex?   No   Have you ever had a serious reaction after receiving a vaccination?   No   Do you have a long-term health problem with heart, lung, kidney, or metabolic disease (e.g., diabetes), asthma, a blood disorder, no spleen, complement component deficiency, a cochlear implant, or a spinal fluid leak?  Are you on long-term aspirin therapy?   No   Do you have cancer, leukemia, HIV/AIDS, or any other immune system problem?   No   Do you have a parent, brother, or sister with an immune system problem?   No   In the past 3 months, have you taken medications that affect  your immune system, such as prednisone, other steroids, or anticancer drugs; drugs for the treatment of rheumatoid arthritis, Crohn s disease, or psoriasis; or have you had radiation treatments?   No   Have you had a seizure, or a brain or other nervous system problem?   No   During the past year, have you received a transfusion of blood or blood    products, or been given immune (gamma) globulin or antiviral drug?   No   For women: Are you pregnant or is there a chance you could become       pregnant during the next month?   No   Have you received any vaccinations in the past 4 weeks?   No     Immunization questionnaire answers were all negative.        Per orders of Carol Bahena CNP, injection of Bivalent Pfizfer given by Lucila Villanueva CMA. Patient instructed to remain in clinic for 15 minutes afterwards, and to report any adverse reaction to me immediately.       Screening performed by Lucila Villanueva CMA on 10/4/2022 at 5:19 PM.

## 2022-10-18 ENCOUNTER — MYC MEDICAL ADVICE (OUTPATIENT)
Dept: FAMILY MEDICINE | Facility: CLINIC | Age: 52
End: 2022-10-18

## 2022-10-18 DIAGNOSIS — G56.03 BILATERAL CARPAL TUNNEL SYNDROME: Primary | ICD-10-CM

## 2022-10-18 NOTE — TELEPHONE ENCOUNTER
"Hi,  Per the pt...  \"Hello,  someone from the clinic called and said I need an occupational referral not a PT referral and they can't schedule until they receive this.  Thanks, Wanda\"    I have Td'd up this referral.    Nova Acosta RN  [facility]    "

## 2022-10-25 NOTE — TELEPHONE ENCOUNTER
DIAGNOSIS: Bilateral carpal tunnel syndrome / EMG sofía / moises Bahena CNP / [employer]   APPOINTMENT DATE: 10.31.22   NOTES STATUS DETAILS   OFFICE NOTE from referring provider Internal 10.18.22 Shruti  9.9.22      OFFICE NOTE from other specialist Internal 9.7.22 Mauri     MEDICATION LIST Internal

## 2022-10-26 ENCOUNTER — HOSPITAL ENCOUNTER (OUTPATIENT)
Dept: MAMMOGRAPHY | Facility: CLINIC | Age: 52
Discharge: HOME | End: 2022-10-26
Attending: NURSE PRACTITIONER | Admitting: NURSE PRACTITIONER
Payer: COMMERCIAL

## 2022-10-26 DIAGNOSIS — Z12.31 VISIT FOR SCREENING MAMMOGRAM: ICD-10-CM

## 2022-10-26 PROCEDURE — 77067 SCR MAMMO BI INCL CAD: CPT

## 2022-10-27 ENCOUNTER — TELEPHONE (OUTPATIENT)
Dept: DERMATOLOGY | Facility: CLINIC | Age: 52
End: 2022-10-27

## 2022-10-27 NOTE — TELEPHONE ENCOUNTER
Lvm sent letter informing Patient her 8-22-23 appointment with  was cancelled. Provider will not be available.

## 2022-10-28 ENCOUNTER — TELEPHONE (OUTPATIENT)
Dept: FAMILY MEDICINE | Facility: CLINIC | Age: 52
End: 2022-10-28

## 2022-10-31 ENCOUNTER — PRE VISIT (OUTPATIENT)
Dept: ORTHOPEDICS | Facility: CLINIC | Age: 52
End: 2022-10-31

## 2022-10-31 ENCOUNTER — E-VISIT (OUTPATIENT)
Dept: URGENT CARE | Facility: URGENT CARE | Age: 52
End: 2022-10-31

## 2022-10-31 ENCOUNTER — OFFICE VISIT (OUTPATIENT)
Dept: ORTHOPEDICS | Facility: CLINIC | Age: 52
End: 2022-10-31
Attending: NURSE PRACTITIONER
Payer: COMMERCIAL

## 2022-10-31 DIAGNOSIS — B96.89 ACUTE BACTERIAL SINUSITIS: Primary | ICD-10-CM

## 2022-10-31 DIAGNOSIS — J01.90 ACUTE BACTERIAL SINUSITIS: Primary | ICD-10-CM

## 2022-10-31 DIAGNOSIS — G56.03 BILATERAL CARPAL TUNNEL SYNDROME: ICD-10-CM

## 2022-10-31 PROCEDURE — 99203 OFFICE O/P NEW LOW 30 MIN: CPT | Performed by: FAMILY MEDICINE

## 2022-10-31 PROCEDURE — 99421 OL DIG E/M SVC 5-10 MIN: CPT | Performed by: PHYSICIAN ASSISTANT

## 2022-10-31 NOTE — PROGRESS NOTES
PCP: Carol Bahena    Wanda Orellana is a 52 year old female who is seen  in consultation at the request of Dr. Bahena presenting with Intermittent bilatearll wrist pain, aching pain into the bilateral thumbs with intermittent tingling.  Recently improved, symptoms or not present today.  Works from home.  Has a sit/stand station at work, not at home.    Normal TSH 6 months ago 4/25/22    Injury: No injury    Location of Pain: bilateral palmar hands  Duration of Pain: 1 year(s)  Rating of Pain: 1/10  Pain is better with: discontinuing typing  Pain is worse with: typing, gripping, biking  Additional Features: numbness/tingling  Treatment so far consists of: rest  Prior History of related problems: none    There were no vitals taken for this visit.          EMG 9/7/22:    Interpretation:  This is an abnormal study. There is electrophysiologic evidence of very mild median neuropathies at the wrist on both sides, as can be seen in the context of bilateral carpal tunnel syndrome. Clinical correlation is recommended.           PMH:  Past Medical History:   Diagnosis Date     Fracture of metatarsal bone(s), closed 11/09    foreign body in foot ?needle?     Headaches      Mild intermittent asthma 2011    exercise induced     Seasonal allergies        Active problem list:  Patient Active Problem List   Diagnosis     GERD (gastroesophageal reflux disease)     AMA (advanced maternal age) primigravida 35+     Mucous polyp of cervix     CARDIOVASCULAR SCREENING; LDL GOAL LESS THAN 160     Mild intermittent asthma     Congenital uterine anomaly     H/O paroxysmal supraventricular tachycardia     Chronic seasonal allergic rhinitis due to other allergen     Bilateral carpal tunnel syndrome       FH:  Family History   Problem Relation Age of Onset     Diabetes Mother      Arthritis Mother      Heart Failure Mother      Lipids Mother      Obesity Mother      Eye Disorder Mother         retinal myopathy     Hypertension Mother       Kidney Disease Mother      Asthma Mother      Respiratory Mother         emphysema-smoker     Unknown/Adopted Father      Diabetes Maternal Grandmother      Arthritis Maternal Grandmother      Unknown/Adopted Paternal Grandmother      Unknown/Adopted Paternal Grandfather      Asthma Son      Alcohol/Drug Maternal Aunt        SH:  Social History     Socioeconomic History     Marital status: Single     Spouse name: Not on file     Number of children: Not on file     Years of education: Not on file     Highest education level: Not on file   Occupational History     Not on file   Tobacco Use     Smoking status: Never     Smokeless tobacco: Never   Substance and Sexual Activity     Alcohol use: Yes     Comment: OCCASSIONALLY- 2 PER MONTH     Drug use: No     Sexual activity: Yes     Partners: Male     Birth control/protection: Pill   Other Topics Concern     Parent/sibling w/ CABG, MI or angioplasty before 65F 55M? Not Asked      Service No     Blood Transfusions No     Caffeine Concern No     Occupational Exposure Not Asked     Hobby Hazards Not Asked     Sleep Concern No     Stress Concern Yes     Comment: care for mom     Weight Concern No     Special Diet No     Back Care No     Exercise Yes     Comment: cardio, wts,      Bike Helmet Yes     Seat Belt No     Self-Exams Yes   Social History Narrative    Caffeine intake/servings daily - 0    Calcium intake/servings daily - 3    Exercise 3+ times weekly - describe biking, cardio    Sunscreen used - Yes    Seatbelts used - Yes    Guns stored in the home - No    Self Breast Exam - No    Pap test up to date -  Yes    Eye exam up to date -  No    Dental exam up to date -  Yes    DEXA scan up to date -  Not Applicable    Flex Sig/Colonoscopy up to date -  Not Applicable    Mammography up to date -  No    Immunizations reviewed and up to date - Yes    Abuse: Current or Past (Physical, Sexual or Emotional) - No    Do you feel safe in your environment - Yes    Do you  cope well with stress - Yes    Do you suffer from insomnia - No    Last updated by: ROBIN BLANCO  1/23/2013                                         Social Determinants of Health     Financial Resource Strain: Not on file   Food Insecurity: Not on file   Transportation Needs: Not on file   Physical Activity: Not on file   Stress: Not on file   Social Connections: Not on file   Intimate Partner Violence: Not on file   Housing Stability: Not on file       MEDS:  See EMR, reviewed  ALL:  See EMR, reviewed    REVIEW OF SYSTEMS:  CONSTITUTIONAL:NEGATIVE for fever, chills, change in weight  INTEGUMENTARY/SKIN: NEGATIVE for worrisome rashes, moles or lesions  EYES: NEGATIVE for vision changes or irritation  ENT/MOUTH: NEGATIVE for ear, mouth and throat problems  RESP:NEGATIVE for significant cough or SOB  BREAST: NEGATIVE for masses, tenderness or discharge  CV: NEGATIVE for chest pain, palpitations or peripheral edema  GI: NEGATIVE for nausea, abdominal pain, heartburn, or change in bowel habits  :NEGATIVE for frequency, dysuria, or hematuria  :NEGATIVE for frequency, dysuria, or hematuria  NEURO: NEGATIVE for weakness, dizziness or paresthesias  ENDOCRINE: NEGATIVE for temperature intolerance, skin/hair changes  HEME/ALLERGY/IMMUNE: NEGATIVE for bleeding problems  PSYCHIATRIC: NEGATIVE for changes in mood or affect      Objective: Grasp strength is normal bilaterally.  Bilateral thumb abduction, opponens, cocking motions of both thumbs normal strength.  No thenar or no hypothenar atrophy.  No swelling noted.  Overlying skin is normal.  Appropriate conversation affect.      Assessment: Carpal tunnel syndrome, mild    Plan: The patient already has an occupational therapy appointment scheduled.  We discussed that postural control and posterior shoulder stabilization exercises from occupational therapy have been found to be helpful in studies for work-related carpal tunnel syndrome.  We discussed the pad that raises  the wrist is also been found to be helpful.  We discussed the importance of proper ergonomic set up of the workstation.  Specific examples given.  She was given a set of nerve glide exercises.  We talked about having her bicycle professionally fit.  We talked about bike gloves with the gel pad for the palm.  We discussed the proper use of night splints.  We discussed indications for carpal tunnel surgery which she would like to avoid at this time.  She will maximize conservative cares and follow-up as needed.

## 2022-10-31 NOTE — LETTER
10/31/2022      RE: Wanda Orellana  [address]  [facility] [address]     Dear Colleague,    Thank you for referring your patient, Wanda Orellana, to the [facility] [place]. Please see a copy of my visit note below.    PCP: Carol Bahena    Wanda Orellana is a 52 year old female who is seen  in consultation at the request of Dr. Bahena presenting with Intermittent bilatearll wrist pain, aching pain into the bilateral thumbs with intermittent tingling.  Recently improved, symptoms or not present today.  Works from home.  Has a sit/stand station at work, not at home.    Normal TSH 6 months ago 4/25/22    Injury: No injury    Location of Pain: bilateral palmar hands  Duration of Pain: 1 year(s)  Rating of Pain: 1/10  Pain is better with: discontinuing typing  Pain is worse with: typing, gripping, biking  Additional Features: numbness/tingling  Treatment so far consists of: rest  Prior History of related problems: none    There were no vitals taken for this visit.          EMG 9/7/22:    Interpretation:  This is an abnormal study. There is electrophysiologic evidence of very mild median neuropathies at the wrist on both sides, as can be seen in the context of bilateral carpal tunnel syndrome. Clinical correlation is recommended.           PMH:  Past Medical History:   Diagnosis Date     Fracture of metatarsal bone(s), closed 11/09    foreign body in foot ?needle?     Headaches      Mild intermittent asthma 2011    exercise induced     Seasonal allergies        Active problem list:  Patient Active Problem List   Diagnosis     GERD (gastroesophageal reflux disease)     AMA (advanced maternal age) primigravida 35+     Mucous polyp of cervix     CARDIOVASCULAR SCREENING; LDL GOAL LESS THAN 160     Mild intermittent asthma     Congenital uterine anomaly     H/O paroxysmal supraventricular tachycardia     Chronic seasonal allergic rhinitis due to other allergen     Bilateral carpal  tunnel syndrome       FH:  Family History   Problem Relation Age of Onset     Diabetes Mother      Arthritis Mother      Heart Failure Mother      Lipids Mother      Obesity Mother      Eye Disorder Mother         retinal myopathy     Hypertension Mother      Kidney Disease Mother      Asthma Mother      Respiratory Mother         emphysema-smoker     Unknown/Adopted Father      Diabetes Maternal Grandmother      Arthritis Maternal Grandmother      Unknown/Adopted Paternal Grandmother      Unknown/Adopted Paternal Grandfather      Asthma Son      Alcohol/Drug Maternal Aunt        SH:  Social History     Socioeconomic History     Marital status: Single     Spouse name: Not on file     Number of children: Not on file     Years of education: Not on file     Highest education level: Not on file   Occupational History     Not on file   Tobacco Use     Smoking status: Never     Smokeless tobacco: Never   Substance and Sexual Activity     Alcohol use: Yes     Comment: OCCASSIONALLY- 2 PER MONTH     Drug use: No     Sexual activity: Yes     Partners: Male     Birth control/protection: Pill   Other Topics Concern     Parent/sibling w/ CABG, MI or angioplasty before 65F 55M? Not Asked      Service No     Blood Transfusions No     Caffeine Concern No     Occupational Exposure Not Asked     Hobby Hazards Not Asked     Sleep Concern No     Stress Concern Yes     Comment: care for mom     Weight Concern No     Special Diet No     Back Care No     Exercise Yes     Comment: cardio, wts,      Bike Helmet Yes     Seat Belt No     Self-Exams Yes   Social History Narrative    Caffeine intake/servings daily - 0    Calcium intake/servings daily - 3    Exercise 3+ times weekly - describe biking, cardio    Sunscreen used - Yes    Seatbelts used - Yes    Guns stored in the home - No    Self Breast Exam - No    Pap test up to date -  Yes    Eye exam up to date -  No    Dental exam up to date -  Yes    DEXA scan up to date -  Not  Applicable    Flex Sig/Colonoscopy up to date -  Not Applicable    Mammography up to date -  No    Immunizations reviewed and up to date - Yes    Abuse: Current or Past (Physical, Sexual or Emotional) - No    Do you feel safe in your environment - Yes    Do you cope well with stress - Yes    Do you suffer from insomnia - No    Last updated by: ROBIN BLANCO  1/23/2013                                         Social Determinants of Health     Financial Resource Strain: Not on file   Food Insecurity: Not on file   Transportation Needs: Not on file   Physical Activity: Not on file   Stress: Not on file   Social Connections: Not on file   Intimate Partner Violence: Not on file   Housing Stability: Not on file       MEDS:  See EMR, reviewed  ALL:  See EMR, reviewed    REVIEW OF SYSTEMS:  CONSTITUTIONAL:NEGATIVE for fever, chills, change in weight  INTEGUMENTARY/SKIN: NEGATIVE for worrisome rashes, moles or lesions  EYES: NEGATIVE for vision changes or irritation  ENT/MOUTH: NEGATIVE for ear, mouth and throat problems  RESP:NEGATIVE for significant cough or SOB  BREAST: NEGATIVE for masses, tenderness or discharge  CV: NEGATIVE for chest pain, palpitations or peripheral edema  GI: NEGATIVE for nausea, abdominal pain, heartburn, or change in bowel habits  :NEGATIVE for frequency, dysuria, or hematuria  :NEGATIVE for frequency, dysuria, or hematuria  NEURO: NEGATIVE for weakness, dizziness or paresthesias  ENDOCRINE: NEGATIVE for temperature intolerance, skin/hair changes  HEME/ALLERGY/IMMUNE: NEGATIVE for bleeding problems  PSYCHIATRIC: NEGATIVE for changes in mood or affect      Objective: Grasp strength is normal bilaterally.  Bilateral thumb abduction, opponens, cocking motions of both thumbs normal strength.  No thenar or no hypothenar atrophy.  No swelling noted.  Overlying skin is normal.  Appropriate conversation affect.      Assessment: Carpal tunnel syndrome, mild    Plan: The patient already has an  occupational therapy appointment scheduled.  We discussed that postural control and posterior shoulder stabilization exercises from occupational therapy have been found to be helpful in studies for work-related carpal tunnel syndrome.  We discussed the pad that raises the wrist is also been found to be helpful.  We discussed the importance of proper ergonomic set up of the workstation.  Specific examples given.  She was given a set of nerve glide exercises.  We talked about having her bicycle professionally fit.  We talked about bike gloves with the gel pad for the palm.  We discussed the proper use of night splints.  We discussed indications for carpal tunnel surgery which she would like to avoid at this time.  She will maximize conservative cares and follow-up as needed.    Sports Medicine Clinic Visit

## 2022-11-02 ENCOUNTER — THERAPY VISIT (OUTPATIENT)
Dept: OCCUPATIONAL THERAPY | Facility: CLINIC | Age: 52
End: 2022-11-02
Attending: NURSE PRACTITIONER
Payer: COMMERCIAL

## 2022-11-02 DIAGNOSIS — G56.03 BILATERAL CARPAL TUNNEL SYNDROME: Primary | ICD-10-CM

## 2022-11-02 PROCEDURE — 97535 SELF CARE MNGMENT TRAINING: CPT | Mod: GO | Performed by: OCCUPATIONAL THERAPIST

## 2022-11-02 PROCEDURE — 97165 OT EVAL LOW COMPLEX 30 MIN: CPT | Mod: GO | Performed by: OCCUPATIONAL THERAPIST

## 2022-11-02 NOTE — PROGRESS NOTES
Hand Therapy Initial Evaluation    Current Date:  11/2/2022    Diagnosis: B CTS  Orders 10/31/22    Precautions: none    Subjective:  Wanda Orellana is a 52 year old female.  Riding bike, pushing lawnmower bothers symptoms, needs to rest. At night, sleeps with elbows flexed, hands together. Any sort of pressure on the limbs seems to impact them - brings on a tingly/fall asleep, not quite right feeling.  Had been biking to work at one point. Old bike, it was what is available.   Working from home - mom is immune compromised.  Since onset symptoms are varied      Answers for HPI/ROS submitted by the patient on 11/2/2022  When problem began:: 1/1/2010  How problem occurred:: guessing computer work  Number scale: 0/10  General health as reported by patient: good  Please check all that apply to your current or past medical history: asthma  Medical allergies: other  Other Allergies Detail: aspirin  Surgeries: other  Other Surgery Detail: baby  Occupation::   What are your primary job tasks: computer work    Occupational Profile Information:  Left hand dominant, but definitely uses the R, ambidextrous, sports with R, can write with R  Prior functional level:  no limitations  Patient reports symptoms of pain, weakness/loss of strength, numbness and tingling   Special tests:  EMG.    Previous treatment: OTC braces  Barriers include:none  Mobility: No difficulty  Transportation: drives and bicycles  Currently working in normal job without restrictions  Leisure activities/hobbies: has a son, lives with spouse, her mom lives with them. 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    Workstation setup    11/2/2022  Comments   Work Location home   Hours on computer per week Every day   Desktop vs Laptop Currently using laptop with a second monitor on the R   Posture (sitting/standing) Sitting. Has a standing desk at home   Chair type Old school chair with rollers, has a back that can move   Desk type and height Cute artsy desk,  "not very wide, glass top   Monitor type and height External monitor, to the right side   Keyboard type and tilt Using laptop KB for now  Does have a external regular KB   Mouse (type, size, R vs L) Ergo mouse, mouse pad with wrist wrist on it   Foot rest    Wrist rest / mouse rest Yes, has a curved pad   Habits / Routine  (breaks, stretches)    Other         Functional Outcome Measure:   Upper Extremity Functional Index Score:  SCORE:   Column Totals: /80: (P) 79   (A lower score indicates greater disability.)    Objective:  Pain Level (Scale 0-10):   11/2/2022   At Rest 0   With Use Had been moderate pain levels, also paresthesias     Pain Description:  Date 11/2/2022   Location B hands (thumb, IF, MF mainly)   Pain Quality Aching, Burning, Exhausting, Numb, Pressure, Sharp, Stabbing, Tender, Throbbing and Tingling   Frequency intermittent     Pain is worst  daytime  Does notice it when waking or when first going to sleep   Exacerbated by  overuse. \" I think the pressure on my forearms (when typing) may be part of it.\"   Relieved by rest   Progression  better at this time, but has had some bad episodes in the past year.     Posture  Mildly Rounded Forward Shoulders    Edema  None, of the hands, but there was a spot that felt swollen starting yesterday, L thumb, FPB area    Sensation  Decreased Median Nerve distribution per pt report    AROM:   B wrists are WNL all planes  B elbows are WNL all planes  B shoulders are WNL all planes    Shoulder Screen:  Comments on ability to do the following functional movements compared to the opposite side (Unable, 1/4 of the motion, 1/2 of the motion, 3/4 of the motion, WNL)  Date 11/2/2022 11/2/2022   Side R L   Reach low back Mid back  mid back   Shoulder abduction WNL WNL, very mild shoulder hike   Shoulder flexion \" \"   Scapular motion during shoulder ROM \" \"         Special Tests  Pain Report:  - none    + mild    ++ moderate    +++ severe    11/2/2022  R 11/2/2022  L   "   Median Nerve Compression at Pronator     Carpal Compression Test--Durkan Test (30 sec)     Lucio Test for Lumbrical Incursion  (fist x 30 secs) Started to notice some tingling in the R finger tips, MF/RF for sure    Tinels at Carpal Tunnel     Phalens ~15-20 seconds light paresthesias  ~15-20 seconds light paresthesias     Neural Tension Testing  MNT: Median Neurodynamic Test (based on DS Hughes's ULNT)  11/2/2022   Left: Not formally completed.  Patient does have some numbness and tingling in the median nerve distribution in the hand with nerve glides especially on higher tension levels.       Strength:  Pain-free /Pinch Test    11/2/2022   Trials R L   1   58 58     Lat Pinch  11/2/2022   Trials R L   1   18.5 15.5  Did feel relatively weaker     3 Pt Pinch  11/2/2022   Trials R L   1   17 15.5  Did feel relatively weaker       Palpation   Pain Report:  - none    + mild    ++ moderate    +++ severe    11/2/2022    Distal volar FA, ulnar side Feels a zing Feels a zing   MEP - -   pronator - -                    Assessment:  Patient presents with symptoms consistent with condition as noted above.    Patient's limitations or Problem List includes: pain and paresthesias of the bilateral hands (L slightly greater than R) which interferes with the patient's ability to perform ADLs and instrumental activities of daily living as compared to previous level of function.    Rehab Potential:  Excellent, expect return to normal activities.    Patient will benefit from skilled Occupational Therapy to increase ROM and decrease pain, to return to previous activity level and resume normal daily tasks and to reach their rehab potential.    Barriers to Learning:  no barriers    Communication Issues:  Patient appears to be able to clearly communicate and understand verbal and written communication and follow directions correctly.    Chart Review: Brief history including review of medical and/or therapy records relating to  the presenting problem and Simple history review with patient    Identified Performance Deficits: work , self cares and home establishment and management.  Assessment of Occupational Performance:  3-5 Performance Deficits    Clinical Decision Making (Complexity): Low complexity    Treatment Explanation:  The following has been discussed with the patient:  RX ordered/plan of care  Anticipated outcomes  Possible risks and side effects    Plan:  Frequency:  2 X a month, once daily  Duration:  for 3 months    Treatment Plan:   Modalities:  US  Therapeutic Exercise:  AROM, PROM, Tendon Gliding, Isometrics and Stabilization  Neuromuscular re-education:  Nerve Gliding, Proprioceptive Training, Posture and Kinesiotaping  Manual Techniques:  Myofascial release  Orthotic Fabrication:  Static and Forearm based  Self Care:  Self Care Tasks, Ergonomic Considerations and Work Tasks    Discharge Plan:  Achieve all LTG.  Independent in home treatment program.  Reach maximal therapeutic benefit.    Home Program:  See flowsheet, ptrx  11/2/2022  Ergonomics: Consider making rice bag mouse rest consider making soft wrist rest.  Discussed potential care needs as well as disc height: May consider an adjustable height tray to put a keyboard on.    Next visit/ Plan:   Check exercises, check ergonomics    Thank you for the opportunity to work with this patient.   If you have questions or concerns, please contact me with an in basket message or via the information below.     Miriam Reeder MS, OTR/L, CHT  Certified Hand Therapist    [facility] - Hand Therapy  [facility] and [facility]  [address] [place] [address], Room [address] [place], [place] [id number]    Email: [email or web address]   Phone / Voicemail: [phone]   Hand Therapy  phone: [phone] (staffed M-F)  Fax: [phone]

## 2022-11-04 ENCOUNTER — MYC MEDICAL ADVICE (OUTPATIENT)
Dept: FAMILY MEDICINE | Facility: CLINIC | Age: 52
End: 2022-11-04

## 2022-11-07 NOTE — TELEPHONE ENCOUNTER
Lizet -     Please see mychart and advise - you have a virtual Thursday 8/:20am?     Africa Brady RN  [facility]

## 2022-11-28 ENCOUNTER — THERAPY VISIT (OUTPATIENT)
Dept: OCCUPATIONAL THERAPY | Facility: CLINIC | Age: 52
End: 2022-11-28
Payer: COMMERCIAL

## 2022-11-28 DIAGNOSIS — G56.03 BILATERAL CARPAL TUNNEL SYNDROME: Primary | ICD-10-CM

## 2022-11-28 PROCEDURE — 97535 SELF CARE MNGMENT TRAINING: CPT | Mod: GO | Performed by: OCCUPATIONAL THERAPIST

## 2022-11-28 PROCEDURE — 97110 THERAPEUTIC EXERCISES: CPT | Mod: GO | Performed by: OCCUPATIONAL THERAPIST

## 2022-11-28 NOTE — PROGRESS NOTES
" Discharge Note - Hand Therapy      Current Date:  11/29/2022      Diagnosis: B CTS  Orders 10/31/22    Precautions: none    Subjective:  Got out wrist braces to wear at night. Had about 10 days off - was not at work and on the computer. Did yoga and had paresthesias with a bit of using the palms down behind her and in front - just for short times. Modified to use fists as wrists. Did not linger after the session. Did have some soreness in the forearm during vacation. Will have 2+ weeks off coming up.      Workstation setup    11/2/2022  Comments 11/28  Pt has not made changes yet, but planning to do so   Work Location home    Hours on computer per week Every day    Desktop vs Laptop Currently using laptop with a second monitor on the R    Posture (sitting/standing) Sitting. Has a standing desk at home    Chair type Old school chair with rollers, has a back that can move    Desk type and height Cute artsy desk, not very wide, glass top    Monitor type and height External monitor, to the right side    Keyboard type and tilt Using laptop KB for now  Does have a external regular KB    Mouse (type, size, R vs L) Ergo mouse, mouse pad with wrist wrist on it    Foot rest     Wrist rest / mouse rest Yes, has a curved pad    Habits / Routine  (breaks, stretches)     Other          Objective:  Pain Level (Scale 0-10):   11/2/2022 11/29/2022     At Rest 0 0   With Use Had been moderate pain levels, also paresthesias Mild, has been improving     Pain Description:  Date 11/2/2022   Location B hands (thumb, IF, MF mainly)   Pain Quality Aching, Burning, Exhausting, Numb, Pressure, Sharp, Stabbing, Tender, Throbbing and Tingling   Frequency intermittent     Pain is worst  daytime  Does notice it when waking or when first going to sleep   Exacerbated by  overuse. \" I think the pressure on my forearms (when typing) may be part of it.\"   Relieved by rest   Progression  better at this time, but has had some bad episodes in the past " "year.     Posture  Mildly Rounded Forward Shoulders    Edema  None    Sensation  Decreased Median Nerve distribution per pt report    AROM:   B wrists are WNL all planes  B elbows are WNL all planes  B shoulders are WNL all planes    Shoulder Screen:  Comments on ability to do the following functional movements compared to the opposite side (Unable, 1/4 of the motion, 1/2 of the motion, 3/4 of the motion, WNL)  Date 11/2/2022 11/2/2022   Side R L   Reach low back Mid back  mid back   Shoulder abduction WNL WNL, very mild shoulder hike   Shoulder flexion \" \"   Scapular motion during shoulder ROM \" \"         Special Tests  Pain Report:  - none    + mild    ++ moderate    +++ severe    11/2/2022  R 11/2/2022  L     Median Nerve Compression at Pronator     Carpal Compression Test--Durkan Test (30 sec)     Lucio Test for Lumbrical Incursion  (fist x 30 secs) Started to notice some tingling in the R finger tips, MF/RF for sure    Tinels at Carpal Tunnel     Phalens ~15-20 seconds light paresthesias  ~15-20 seconds light paresthesias     Neural Tension Testing  MNT: Median Neurodynamic Test (based on DS Ellen's ULNT)  11/2/2022   Left: Not formally completed.  Patient does have some numbness and tingling in the median nerve distribution in the hand with nerve glides especially on higher tension levels.       Strength:  Pain-free /Pinch Test    11/2/2022   Trials R L   1   58 58     Lat Pinch  11/2/2022   Trials R L   1   18.5 15.5  Did feel relatively weaker     3 Pt Pinch  11/2/2022   Trials R L   1   17 15.5  Did feel relatively weaker       Palpation   Pain Report:  - none    + mild    ++ moderate    +++ severe    11/2/2022    Distal volar FA, ulnar side Feels a zing Feels a zing   MEP - -   pronator - -                    Assessment:  Response to therapy has been improvement to: Improved self-management of symptoms  Appropriateness of Rx I have re-evaluated this patient and find that the nature, scope, " duration and intensity of the therapy is appropriate for the medical condition of the patient.  Overall Assessment:  Patient is progressing well.  Patient is ready to be discharged from therapy and continue their home treatment program.  STG/LTG:  See goal sheet for details and updates.    Plan:  Frequency/Duration:  Discharge from Hand Therapy; continue home program.      Home Program:  See flowsheet, ptrx  Ergonomics: Consider making rice bag mouse rest consider making soft wrist rest.  Discussed potential care needs as well as disc height: May consider an adjustable height tray to put a keyboard on.  Wearing braces at night - tolerable, not waking stiff or anything.

## 2022-11-29 PROBLEM — G56.03 BILATERAL CARPAL TUNNEL SYNDROME: Status: RESOLVED | Noted: 2022-09-12 | Resolved: 2022-11-29

## 2023-01-17 ENCOUNTER — MYC MEDICAL ADVICE (OUTPATIENT)
Dept: FAMILY MEDICINE | Facility: CLINIC | Age: 53
End: 2023-01-17
Payer: COMMERCIAL

## 2023-01-17 DIAGNOSIS — J30.9 ALLERGIC RHINITIS, UNSPECIFIED SEASONALITY, UNSPECIFIED TRIGGER: ICD-10-CM

## 2023-01-24 ENCOUNTER — OFFICE VISIT (OUTPATIENT)
Dept: FAMILY MEDICINE | Facility: CLINIC | Age: 53
End: 2023-01-24
Payer: COMMERCIAL

## 2023-01-24 VITALS
SYSTOLIC BLOOD PRESSURE: 134 MMHG | RESPIRATION RATE: 16 BRPM | WEIGHT: 142.5 LBS | HEIGHT: 66 IN | DIASTOLIC BLOOD PRESSURE: 86 MMHG | TEMPERATURE: 97.6 F | BODY MASS INDEX: 22.9 KG/M2 | HEART RATE: 88 BPM | OXYGEN SATURATION: 100 %

## 2023-01-24 DIAGNOSIS — J45.21 MILD INTERMITTENT ASTHMA WITH ACUTE EXACERBATION: ICD-10-CM

## 2023-01-24 PROCEDURE — 99215 OFFICE O/P EST HI 40 MIN: CPT | Performed by: NURSE PRACTITIONER

## 2023-01-24 RX ORDER — FLUTICASONE PROPIONATE 220 UG/1
2 AEROSOL, METERED RESPIRATORY (INHALATION) 2 TIMES DAILY
Qty: 36 G | Refills: 3 | Status: SHIPPED | OUTPATIENT
Start: 2023-01-24 | End: 2024-05-28

## 2023-01-24 RX ORDER — FLUTICASONE PROPIONATE 50 MCG
1-2 SPRAY, SUSPENSION (ML) NASAL DAILY
Qty: 48 G | Refills: 1 | Status: SHIPPED | OUTPATIENT
Start: 2023-01-24 | End: 2023-09-28

## 2023-01-24 RX ORDER — BUDESONIDE AND FORMOTEROL FUMARATE DIHYDRATE 80; 4.5 UG/1; UG/1
2 AEROSOL RESPIRATORY (INHALATION) 2 TIMES DAILY
Qty: 10.2 G | Refills: 11 | Status: SHIPPED | OUTPATIENT
Start: 2023-01-24

## 2023-01-24 ASSESSMENT — ASTHMA QUESTIONNAIRES
ACT_TOTALSCORE: 13
QUESTION_4 LAST FOUR WEEKS HOW OFTEN HAVE YOU USED YOUR RESCUE INHALER OR NEBULIZER MEDICATION (SUCH AS ALBUTEROL): ONE OR TWO TIMES PER DAY
QUESTION_2 LAST FOUR WEEKS HOW OFTEN HAVE YOU HAD SHORTNESS OF BREATH: MORE THAN ONCE A DAY
QUESTION_5 LAST FOUR WEEKS HOW WOULD YOU RATE YOUR ASTHMA CONTROL: POORLY CONTROLLED
ACT_TOTALSCORE: 13
QUESTION_1 LAST FOUR WEEKS HOW MUCH OF THE TIME DID YOUR ASTHMA KEEP YOU FROM GETTING AS MUCH DONE AT WORK, SCHOOL OR AT HOME: SOME OF THE TIME
QUESTION_3 LAST FOUR WEEKS HOW OFTEN DID YOUR ASTHMA SYMPTOMS (WHEEZING, COUGHING, SHORTNESS OF BREATH, CHEST TIGHTNESS OR PAIN) WAKE YOU UP AT NIGHT OR EARLIER THAN USUAL IN THE MORNING: NOT AT ALL

## 2023-01-24 ASSESSMENT — PAIN SCALES - GENERAL: PAINLEVEL: NO PAIN (0)

## 2023-01-24 NOTE — LETTER
My Asthma Action Plan    Name: Wanda Orellana   YOB: 1970  Date: 1/24/2023   My doctor: MARY JO Willams CNP   My clinic: [facility]        My Rescue Medicine:   Albuterol inhaler (Proair/Ventolin/Proventil HFA)  2-4 puffs EVERY 4 HOURS as needed. Use a spacer if recommended by your provider.  Controller med: Symbicort 2 puffs twice a day My Asthma Severity:   Mild Persistent  Know your asthma triggers: upper respiratory infections             GREEN ZONE   Good Control    I feel good    No cough or wheeze    Can work, sleep and play without asthma symptoms       Take your asthma control medicine every day.     1. If exercise triggers your asthma, take your rescue medication    15 minutes before exercise or sports, and    During exercise if you have asthma symptoms  2. Spacer to use with inhaler: If you have a spacer, make sure to use it with your inhaler             YELLOW ZONE Getting Worse  I have ANY of these:    I do not feel good    Cough or wheeze    Chest feels tight    Wake up at night   1. Keep taking your Green Zone medications  2. Start taking your rescue medicine:    every 20 minutes for up to 1 hour. Then every 4 hours for 24-48 hours.  3. If you stay in the Yellow Zone for more than 12-24 hours, contact your doctor.  4. If you do not return to the Green Zone in 12-24 hours or you get worse, start taking your oral steroid medicine if prescribed by your provider.           RED ZONE Medical Alert - Get Help  I have ANY of these:    I feel awful    Medicine is not helping    Breathing getting harder    Trouble walking or talking    Nose opens wide to breathe       1. Take your rescue medicine NOW  2. If your provider has prescribed an oral steroid medicine, start taking it NOW  3. Call your doctor NOW  4. If you are still in the Red Zone after 20 minutes and you have not reached your doctor:    Take your rescue medicine again and    Call 911 or go to the  emergency room right away    See your regular doctor within 2 weeks of an Emergency Room or Urgent Care visit for follow-up treatment.          Annual Reminders:  Meet with Asthma Educator,  Flu Shot in the Fall, consider Pneumonia Vaccination for patients with asthma (aged 19 and older).    Pharmacy:    [id number] - [place], [place] - [address] [id number] - [place], [place] - [address]    Electronically signed by MARY JO Willams CNP   Date: 01/24/23                    Asthma Triggers  How To Control Things That Make Your Asthma Worse    Triggers are things that make your asthma worse.  Look at the list below to help you find your triggers and   what you can do about them. You can help prevent asthma flare-ups by staying away from your triggers.      Trigger                                                          What you can do   Cigarette Smoke  Tobacco smoke can make asthma worse. Do not allow smoking in your home, car or around you.  Be sure no one smokes at a child s day care or school.  If you smoke, ask your health care provider for ways to help you quit.  Ask family members to quit too.  Ask your health care provider for a referral to Quit Plan to help you quit smoking, or call 5-157-332-PLAN.     Colds, Flu, Bronchitis  These are common triggers of asthma. Wash your hands often.  Don t touch your eyes, nose or mouth.  Get a flu shot every year.     Dust Mites  These are tiny bugs that live in cloth or carpet. They are too small to see. Wash sheets and blankets in hot water every week.   Encase pillows and mattress in dust mite proof covers.  Avoid having carpet if you can. If you have carpet, vacuum weekly.   Use a dust mask and HEPA vacuum.   Pollen and Outdoor Mold  Some people are allergic to trees, grass, or weed pollen, or molds. Try to keep your windows closed.  Limit time out doors when pollen count is high.   Ask you health care provider about taking  medicine during allergy season.     Animal Dander  Some people are allergic to skin flakes, urine or saliva from pets with fur or feathers. Keep pets with fur or feathers out of your home.    If you can t keep the pet outdoors, then keep the pet out of your bedroom.  Keep the bedroom door closed.  Keep pets off cloth furniture and away from stuffed toys.     Mice, Rats, and Cockroaches  Some people are allergic to the waste from these pests.   Cover food and garbage.  Clean up spills and food crumbs.  Store grease in the refrigerator.   Keep food out of the bedroom.   Indoor Mold  This can be a trigger if your home has high moisture. Fix leaking faucets, pipes, or other sources of water.   Clean moldy surfaces.  Dehumidify basement if it is damp and smelly.   Smoke, Strong Odors, and Sprays  These can reduce air quality. Stay away from strong odors and sprays, such as perfume, powder, hair spray, paints, smoke incense, paint, cleaning products, candles and new carpet.   Exercise or Sports  Some people with asthma have this trigger. Be active!  Ask your doctor about taking medicine before sports or exercise to prevent symptoms.    Warm up for 5-10 minutes before and after sports or exercise.     Other Triggers of Asthma  Cold air:  Cover your nose and mouth with a scarf.  Sometimes laughing or crying can be a trigger.  Some medicines and food can trigger asthma.

## 2023-01-24 NOTE — PROGRESS NOTES
Assessment & Plan     Mild intermittent asthma with acute exacerbation  After taking own albuterol, restricted lung sounds on expiration improved. No rales or rhonchi noted. No wheezing. No fever/chills/myalgias etc to suggest pneumonia. This is respiratory infection induced exacerbation. Stable at this point to not need oral steroids. Symbicort was not covered per [employer] message so prescribing flovent 220 mcg with instruction for any high dose ICS best covered. Treat allergies with daily oral antihistamine and nasal spray as there is evidence of allergic rhinitis despite no over symptoms and this would be possible risk factor for asthma  Return in 4 weeks and repeat ACT  - budesonide-formoterol (SYMBICORT) 80-4.5 MCG/ACT Inhaler; Inhale 2 puffs into the lungs 2 times daily    Prescription drug management  50 minutes spent on the date of the encounter doing chart review, history and exam, documentation and further activities per the note      Return in about 4 weeks (around 2/21/2023) for ACT - virtual visit.    MARY JO Willams [facility]    Sydnie Muñoz is a 53 year old presenting for the following health issues:    Follow Up (Still having heavy chest, shortness of breath, feel the need to cough to get stuff out-started back in Nov did virtual visit doesn't seem to be getting better)      History of Present Illness     Asthma:  She presents for follow up of asthma.  She has some cough, no wheezing, and some shortness of breath. She is using a relief medication a few times a week. She does not have a controller medication. Patient is aware of the following triggers: exercise or sports. The patient has not had a visit to the Emergency Room, Urgent Care or Hospital due to asthma since the last clinic visit.     She eats 2-3 servings of fruits and vegetables daily.She consumes 0 sweetened beverage(s) daily.She exercises with enough effort to increase her heart rate 10 to 19  "minutes per day.  She exercises with enough effort to increase her heart rate 3 or less days per week.   She is taking medications regularly.     Diagnosed with sinusitis via e-visit in November. Symptoms at that time included facial pain and congestion, teeth pain, nasal drainage and coughing. Took amoxicillin and felt the symptoms improved, but not entirely resolved. In December, symptoms became primarily respiratory with sticky sensation in the chest and same type of cough. Since then it has changed into less cough, but more trouble breathing. Feels short of breath or often needing to catch breath - difficult breathing with activities that would not normally cause this. Multiple negative covid tests.    No fever, chills, weight loss. No blood in the sputum. No current congestion. Has typical baseline rhinorrhea of the winter. Taking cetirizine nightly. Taking albuterol a few times a week. Takes flonase twice a year with sesonanl allergies. Did also take this with the sinusitis. Not currently taking.    ACT Total Scores 3/20/2019 7/5/2022 1/24/2023   ACT TOTAL SCORE (Goal Greater than or Equal to 20) 24 23 13   In the past 12 months, how many times did you visit the emergency room for your asthma without being admitted to the hospital? 0 0 0   In the past 12 months, how many times were you hospitalized overnight because of your asthma? 0 0 0       Review of Systems   Constitutional, HEENT, cardiovascular, pulmonary, gi and gu systems are negative, except as otherwise noted.      Objective    /86 (BP Location: Left arm, Patient Position: Sitting, Cuff Size: Adult Regular)   Pulse 88   Temp 97.6  F (36.4  C) (Temporal)   Resp 16   Ht 1.683 m (5' 6.26\")   Wt 64.6 kg (142 lb 8 oz)   LMP 01/19/2023 (Exact Date)   SpO2 100%   BMI 22.82 kg/m    Body mass index is 22.82 kg/m .  Physical Exam   GENERAL: healthy, alert and no distress  EYES: Eyes grossly normal to inspection, PERRL and conjunctivae and sclerae " normal  HENT: normal cephalic/atraumatic, both ears: clear effusion, nose and mouth without ulcers or lesions, oral mucous membranes moist and cobblestoning  NECK: no adenopathy, no asymmetry, masses, or scars and thyroid normal to palpation  RESP: lungs clear to auscultation - no rales, rhonchi or wheezes; reduced sounds bibasilar on expiration  CV: regular rate and rhythm, normal S1 S2, no S3 or S4, no murmur, click or rub, no peripheral edema and peripheral pulses strong

## 2023-02-23 ENCOUNTER — VIRTUAL VISIT (OUTPATIENT)
Dept: FAMILY MEDICINE | Facility: CLINIC | Age: 53
End: 2023-02-23
Payer: COMMERCIAL

## 2023-02-23 DIAGNOSIS — J45.21 MILD INTERMITTENT ASTHMA WITH ACUTE EXACERBATION: Primary | ICD-10-CM

## 2023-02-23 PROCEDURE — 99215 OFFICE O/P EST HI 40 MIN: CPT | Mod: 95 | Performed by: NURSE PRACTITIONER

## 2023-02-23 RX ORDER — BUDESONIDE AND FORMOTEROL FUMARATE DIHYDRATE 80; 4.5 UG/1; UG/1
2 AEROSOL RESPIRATORY (INHALATION) 2 TIMES DAILY
COMMUNITY
Start: 2023-02-23 | End: 2024-06-28

## 2023-02-23 NOTE — PATIENT INSTRUCTIONS
Complete course of four weeks daily - ok to do trial off after this and see if symptoms return. If they do, trial one time a day administration.  If you fall off taking it regularly, reach for this (Symbicort) when you need a rescue inhaler  Maybe  2 more Symbicort inhalers - if it is the same cost to get the 160/4.5 mg you could use this less often and it would last longer    Continue cetirizine (Zyrtec) and fluticasone (Flonase nasal spray) all of the time year round  Talk to me before you stop these for any reason

## 2023-02-23 NOTE — PROGRESS NOTES
Wanda is a 53 year old who is being evaluated via a billable telephone visit.      What phone number would you like to be contacted at? [phone]    How would you like to obtain your AVS? Orly  Distant Location (provider location):  Off-site     Start vide via AmWell:  8:40 AM  End AmWell video: 9:07 AM     Assessment & Plan     Mild intermittent asthma with acute exacerbation  Doing really well on budesonide. Added to med list as historical. I am leaving flovent on the med list at this point in case a fill is needed at some point. Continue allergy medications (oral cetirizine and nasal fluticasone) indefinitely to reduce this trigger. May be difficult to avoid respiratory infections      Prescription drug management  44 minutes spent on the date of the encounter doing chart review, history and exam, documentation and further activities per the note       Patient Instructions   Complete course of four weeks daily - ok to do trial off after this and see if symptoms return. If they do, trial one time a day administration.  If you fall off taking it regularly, reach for this (Symbicort) when you need a rescue inhaler  Maybe  2 more Symbicort inhalers - if it is the same cost to get the 160/4.5 mg you could use this less often and it would last longer    Continue cetirizine (Zyrtec) and fluticasone (Flonase nasal spray) all of the time year round  Talk to me before you stop these for any reason        Return in about 4 months (around 6/27/2023) for Physical Exam, Asthma Recheck and ACT.    MARY JO Willams [facility]    Subjective   Wanda is a 53 year old accompanied by her self, presenting for the following health issues:  Asthma      HPI     Seen 1/24/23 for concerns of pneumonia and ultimately diagnosed with URI induced asthma flare. Started on high dose ICS (symbicort not covered). Also advised allergic rhinitis treatment with oral antihistamine and nasal steroid. Today  is a follow-up.    Update: Has been taking the inhaler and floanse in the morning and inhaler at night. Was able to get Symbicort from [place]. Feeling much better: chest congestion and breathing is a lot easier - doesn't have to think about breathing all the time, not coughing.    Main asthma trigger is URIs and allergies.    Review of Systems     ROS:5 point ROS including CONST, HEENT, Respiratory, CV, and GI other than that noted in the HPI,  is negative         Objective           Vitals:  No vitals were obtained today due to virtual visit.    Physical Exam   healthy, alert and no distress  PSYCH: Alert and oriented times 3; coherent speech, normal   rate and volume, able to articulate logical thoughts, able   to abstract reason, no tangential thoughts, no hallucinations   or delusions  Her affect is normal and pleasant  RESP: No cough, no audible wheezing, able to talk in full sentences  Remainder of exam unable to be completed due to telephone visits            Phone call duration: 27 minutes

## 2023-06-27 ENCOUNTER — OFFICE VISIT (OUTPATIENT)
Dept: FAMILY MEDICINE | Facility: CLINIC | Age: 53
End: 2023-06-27
Payer: COMMERCIAL

## 2023-06-27 VITALS
RESPIRATION RATE: 20 BRPM | TEMPERATURE: 99.2 F | OXYGEN SATURATION: 98 % | HEIGHT: 67 IN | DIASTOLIC BLOOD PRESSURE: 82 MMHG | SYSTOLIC BLOOD PRESSURE: 126 MMHG | BODY MASS INDEX: 22.05 KG/M2 | HEART RATE: 87 BPM | WEIGHT: 140.5 LBS

## 2023-06-27 DIAGNOSIS — Z13.220 LIPID SCREENING: ICD-10-CM

## 2023-06-27 DIAGNOSIS — Z00.00 ROUTINE GENERAL MEDICAL EXAMINATION AT A HEALTH CARE FACILITY: Primary | ICD-10-CM

## 2023-06-27 DIAGNOSIS — F43.0 ACUTE REACTION TO STRESS: ICD-10-CM

## 2023-06-27 DIAGNOSIS — Z12.4 SCREENING FOR MALIGNANT NEOPLASM OF CERVIX: ICD-10-CM

## 2023-06-27 DIAGNOSIS — J45.20 MILD INTERMITTENT ASTHMA WITHOUT COMPLICATION: ICD-10-CM

## 2023-06-27 DIAGNOSIS — R09.81 CONGESTION OF PARANASAL SINUS: ICD-10-CM

## 2023-06-27 DIAGNOSIS — Z86.79 H/O PAROXYSMAL SUPRAVENTRICULAR TACHYCARDIA: ICD-10-CM

## 2023-06-27 DIAGNOSIS — R10.13 EPIGASTRIC PAIN: ICD-10-CM

## 2023-06-27 LAB
ANION GAP SERPL CALCULATED.3IONS-SCNC: 11 MMOL/L (ref 7–15)
BUN SERPL-MCNC: 8.2 MG/DL (ref 6–20)
CALCIUM SERPL-MCNC: 9.6 MG/DL (ref 8.6–10)
CHLORIDE SERPL-SCNC: 104 MMOL/L (ref 98–107)
CHOLEST SERPL-MCNC: 161 MG/DL
CREAT SERPL-MCNC: 0.85 MG/DL (ref 0.51–0.95)
DEPRECATED HCO3 PLAS-SCNC: 24 MMOL/L (ref 22–29)
GFR SERPL CREATININE-BSD FRML MDRD: 81 ML/MIN/1.73M2
GLUCOSE SERPL-MCNC: 93 MG/DL (ref 70–99)
HDLC SERPL-MCNC: 61 MG/DL
LDLC SERPL CALC-MCNC: 80 MG/DL
NONHDLC SERPL-MCNC: 100 MG/DL
POTASSIUM SERPL-SCNC: 4.4 MMOL/L (ref 3.4–5.3)
SODIUM SERPL-SCNC: 139 MMOL/L (ref 136–145)
TRIGL SERPL-MCNC: 101 MG/DL
TSH SERPL DL<=0.005 MIU/L-ACNC: 3.19 UIU/ML (ref 0.3–4.2)

## 2023-06-27 PROCEDURE — 90750 HZV VACC RECOMBINANT IM: CPT | Performed by: NURSE PRACTITIONER

## 2023-06-27 PROCEDURE — 84443 ASSAY THYROID STIM HORMONE: CPT | Performed by: NURSE PRACTITIONER

## 2023-06-27 PROCEDURE — 90746 HEPB VACCINE 3 DOSE ADULT IM: CPT | Performed by: NURSE PRACTITIONER

## 2023-06-27 PROCEDURE — 90472 IMMUNIZATION ADMIN EACH ADD: CPT | Performed by: NURSE PRACTITIONER

## 2023-06-27 PROCEDURE — 80048 BASIC METABOLIC PNL TOTAL CA: CPT | Performed by: NURSE PRACTITIONER

## 2023-06-27 PROCEDURE — 90677 PCV20 VACCINE IM: CPT | Performed by: NURSE PRACTITIONER

## 2023-06-27 PROCEDURE — 90471 IMMUNIZATION ADMIN: CPT | Performed by: NURSE PRACTITIONER

## 2023-06-27 PROCEDURE — 80061 LIPID PANEL: CPT | Performed by: NURSE PRACTITIONER

## 2023-06-27 PROCEDURE — 99214 OFFICE O/P EST MOD 30 MIN: CPT | Mod: 25 | Performed by: NURSE PRACTITIONER

## 2023-06-27 PROCEDURE — 36415 COLL VENOUS BLD VENIPUNCTURE: CPT | Performed by: NURSE PRACTITIONER

## 2023-06-27 PROCEDURE — 99396 PREV VISIT EST AGE 40-64: CPT | Mod: 25 | Performed by: NURSE PRACTITIONER

## 2023-06-27 ASSESSMENT — ASTHMA QUESTIONNAIRES
ACT_TOTALSCORE: 21
ACT_TOTALSCORE: 21
QUESTION_5 LAST FOUR WEEKS HOW WOULD YOU RATE YOUR ASTHMA CONTROL: COMPLETELY CONTROLLED
QUESTION_4 LAST FOUR WEEKS HOW OFTEN HAVE YOU USED YOUR RESCUE INHALER OR NEBULIZER MEDICATION (SUCH AS ALBUTEROL): ONCE A WEEK OR LESS
QUESTION_2 LAST FOUR WEEKS HOW OFTEN HAVE YOU HAD SHORTNESS OF BREATH: ONCE OR TWICE A WEEK
QUESTION_1 LAST FOUR WEEKS HOW MUCH OF THE TIME DID YOUR ASTHMA KEEP YOU FROM GETTING AS MUCH DONE AT WORK, SCHOOL OR AT HOME: A LITTLE OF THE TIME
QUESTION_3 LAST FOUR WEEKS HOW OFTEN DID YOUR ASTHMA SYMPTOMS (WHEEZING, COUGHING, SHORTNESS OF BREATH, CHEST TIGHTNESS OR PAIN) WAKE YOU UP AT NIGHT OR EARLIER THAN USUAL IN THE MORNING: ONCE OR TWICE

## 2023-06-27 ASSESSMENT — ENCOUNTER SYMPTOMS
HEARTBURN: 0
PARESTHESIAS: 0
DIZZINESS: 0
CONSTIPATION: 0
PALPITATIONS: 1
WEAKNESS: 0
FEVER: 0
ABDOMINAL PAIN: 0
HEMATOCHEZIA: 0
BREAST MASS: 0
COUGH: 1
EYE PAIN: 0
JOINT SWELLING: 0
FREQUENCY: 0
HEADACHES: 0
DIARRHEA: 0
SHORTNESS OF BREATH: 1
CHILLS: 0
ARTHRALGIAS: 0
SORE THROAT: 1
DYSURIA: 0
NAUSEA: 0
MYALGIAS: 1
HEMATURIA: 0

## 2023-06-27 ASSESSMENT — PAIN SCALES - GENERAL: PAINLEVEL: MILD PAIN (3)

## 2023-06-27 NOTE — RESULT ENCOUNTER NOTE
Wanda,    Your labs were all normal. Cholesterol looks fantastic!  If you have any questions, please feel free to contact the clinic.    KATHERIN Santos

## 2023-06-27 NOTE — PATIENT INSTRUCTIONS
"Nasal rinses - [employer] products for salilne rinses  Keep up with the Nasonex  The mucinex is ok  Tylenol is ok    Do take the combo inhaler if you are sick in a way that affects breathing - budesonide-formoterol    You should be able to make an appointment with cardiology    Schedule with OB-GYN    [facility] - [phone]  Farida Hidalgo  [facility]      Outside of [place] - recommended by patients who have had good experiences    Tricia Reeder -  contact is at [email or web address], or to leave a voicemail at [phone] ext. [phone]. I have lots of room for weekday, daytime appointments    [facility] - [email or web address]    Shanta Burkett [email or web address]    [facility]  Tammy Beckham (?taking new patients?)  [phone]    [facility] in [place] - birth trauma, body work  [phone]    [facility] and [place] on [address]  [employer] [employer]  [phone]    Talisha Muñoz  [phone]  [address]   [place], [place], [address]    Osorio Mojica - \"sex-positive\"  [address]  [address]  [place], [place] [address]     [place] [facility] on [facility] [address] in [place]  [address]  [place] [address]  Telephone: [phone]    Brooke Mendiola  [address]  [place] [address]  Phone: [phone] X [phone]  [email or web address]    [facility]  [email or web address]  [address]  [place] [address]  Tel: [phone]    Jean Casey  [email or web address]       .       "

## 2023-06-27 NOTE — PROGRESS NOTES
"   SUBJECTIVE:   CC: Wanda is an 53 year old who presents for preventive health visit.       6/27/2023     8:14 AM   Additional Questions   Roomed by Renetta TODD     Healthy Habits:     Getting at least 3 servings of Calcium per day:  NO    Bi-annual eye exam:  Yes    Dental care twice a year:  Yes    Sleep apnea or symptoms of sleep apnea:  Daytime drowsiness, Excessive snoring and Sleep apnea    Diet:  Regular (no restrictions)    Frequency of exercise:  4-5 days/week    Duration of exercise:  30-45 minutes    Taking medications regularly:  Yes    Medication side effects:  None    PHQ-2 Total Score: 0    Additional concerns today:  No      HM -               Cancer screenings - last pap in 2019, negative; mammogram 10/2022 negative, repeat in one year; colonoscopy in 2022, biopsied polyp negative, repeat in 10y              Chronic conditions screenings - Asthma              Immunizations - ok for all Shingrix, PCV, hepatitis B  Habits  -   Exercise - walks a lot; with family and neighbors; yoga on the weekends              Nutrition - feels like she eats healthy; eggs and vegetables               Mental health/mindfulness - walking; talking with family; mom going through dialysis               Alcohol/cigarettes - no smoking; occasional drink  Sexual health - \"last on the list\" dt having a busy house; no STI concerns  Goals -  Would like to participate in more strength training  Challenges - heart PSVT concerns, cardiology apt last year; feels like it changed; random uccurrence, couple times a week to not happening for a few weeks; would like to go back to cardiology      Sinus pressure - started 3 days ago; associated sx include some right ear aching, draining green discharge from nose, throat sore. Aches. Reports that she seems to get sick more frequently lately; had something similar in fall and winter. Has taken Nasonex and takes nightly cetirizine.    Asthma - has not felt a need for medications as she reports " being healthy overall. Has UTD budesonide-fomoterol inhaler on hand.          7/5/2022     2:29 PM 1/24/2023    11:17 AM 6/27/2023     8:02 AM   ACT Total Scores   ACT TOTAL SCORE (Goal Greater than or Equal to 20) 23 13 21   In the past 12 months, how many times did you visit the emergency room for your asthma without being admitted to the hospital? 0 0 0   In the past 12 months, how many times were you hospitalized overnight because of your asthma? 0 0 0         Today's PHQ-2 Score:       6/27/2023     8:01 AM   PHQ-2 ( 1999 [employer])   Q1: Little interest or pleasure in doing things 0   Q2: Feeling down, depressed or hopeless 0   PHQ-2 Score 0   Q1: Little interest or pleasure in doing things Not at all   Q2: Feeling down, depressed or hopeless Not at all   PHQ-2 Score 0         Social History     Tobacco Use     Smoking status: Never     Smokeless tobacco: Never   Substance Use Topics     Alcohol use: Yes     Comment: OCCASSIONALLY- 2 PER MONTH           6/27/2023     8:00 AM   Alcohol Use   Prescreen: >3 drinks/day or >7 drinks/week? No          No data to display              Reviewed orders with patient.  Reviewed health maintenance and updated orders accordingly - Yes  Lab work is in process  Labs reviewed in EPIC    Breast Cancer Screening:    FHS-7:       10/26/2022     3:39 PM   Breast CA Risk Assessment (FHS-7)   Did any of your first-degree relatives have breast or ovarian cancer? No   Did any of your relatives have bilateral breast cancer? No   Did any man in your family have breast cancer? No   Did any woman in your family have breast and ovarian cancer? No   Did any woman in your family have breast cancer before age 50 y? No   Do you have 2 or more relatives with breast and/or ovarian cancer? No   Do you have 2 or more relatives with breast and/or bowel cancer? No     click delete button to remove this line now      Mammogram Screening: Recommended annual mammography  Pertinent mammograms are reviewed  under the imaging tab.    History of abnormal Pap smear: Last 3 Pap and HPV Results:       Latest Ref Rng & Units 3/20/2019     6:50 PM 3/20/2019     6:05 PM 2/10/2016    10:46 AM   PAP / HPV   PAP (Historical)   OTHER-NIL, See Result     HPV 16 DNA NEG^Negative Negative   Negative    HPV 18 DNA NEG^Negative Negative   Negative    Other HR HPV NEG^Negative Negative   Negative          Latest Ref Rng & Units 3/20/2019     6:50 PM 3/20/2019     6:05 PM 2/10/2016    10:46 AM   PAP / HPV   PAP (Historical)   OTHER-NIL, See Result     HPV 16 DNA NEG^Negative Negative   Negative    HPV 18 DNA NEG^Negative Negative   Negative    Other HR HPV NEG^Negative Negative   Negative      Reviewed and updated as needed this visit by clinical staff   Tobacco  Allergies  Meds              Reviewed and updated as needed this visit by Provider                 Past Medical History:   Diagnosis Date     Fracture of metatarsal bone(s), closed     foreign body in foot ?needle?     Headaches      Mild intermittent asthma     exercise induced     Seasonal allergies       Past Surgical History:   Procedure Laterality Date     COLONOSCOPY N/A 2022    Procedure: COLONOSCOPY, WITH POLYPECTOMY;  Surgeon: Alvino Chamberlain MD;  Location: [facility] OR     DILATION AND CURETTAGE SUCTION  2012    Procedure:DILATION AND CURETTAGE SUCTION; Suction Dilation & Curettage   (11 weeks); Surgeon:CHESTER ALVAREZ; Location:[facility] OR     EYE SURGERY      laser surg w/ complications of infections, etc     WISDOM TEETH[       OB History    Para Term  AB Living   2 1 1 0 1 1   SAB IAB Ectopic Multiple Live Births   1 0 0 0 1      # Outcome Date GA Lbr Joss/2nd Weight Sex Delivery Anes PTL Lv   2 Term 13 37w2d 06:00 / 02:20 2.75 kg (6 lb 1 oz) M Vag-Spont EPI N BOWEN      Name: NATHANIEL,ANDRES PATEL      Apgar1: 8  Apgar5: 9   1 SAB 12 10w0d    AB, MISSED Gen  DEC      Birth Comments: suction D&C, trisomy 21.       Review of  "Systems   Constitutional: Negative for chills and fever.   HENT: Positive for ear pain and sore throat. Negative for congestion and hearing loss.    Eyes: Negative for pain and visual disturbance.   Respiratory: Positive for cough and shortness of breath.    Cardiovascular: Positive for palpitations. Negative for chest pain and peripheral edema.   Gastrointestinal: Negative for abdominal pain, constipation, diarrhea, heartburn, hematochezia and nausea.   Breasts:  Negative for tenderness, breast mass and discharge.   Genitourinary: Negative for dysuria, frequency, genital sores, hematuria, pelvic pain, urgency, vaginal bleeding and vaginal discharge.   Musculoskeletal: Positive for myalgias. Negative for arthralgias and joint swelling.   Skin: Negative for rash.   Neurological: Negative for dizziness, weakness, headaches and paresthesias.   Psychiatric/Behavioral: Negative for mood changes.     OBJECTIVE:   /82 (BP Location: Left arm, Patient Position: Sitting, Cuff Size: Adult Regular)   Pulse 87   Temp 99.2  F (37.3  C) (Temporal)   Resp 20   Ht 1.695 m (5' 6.73\")   Wt 63.7 kg (140 lb 8 oz)   LMP 06/13/2023 (Within Days)   SpO2 98%   BMI 22.18 kg/m    Physical Exam  GENERAL: healthy, alert and no distress  EYES: Eyes grossly normal to inspection, PERRL and conjunctivae and sclerae normal  HENT: ear canals and TM's normal with right ear erythema; nose and mouth without ulcers or lesions; throat erythema present   NECK: no adenopathy, no asymmetry, masses, or scars and thyroid normal to palpation  RESP: lungs clear to auscultation - no rales, rhonchi or wheezes  BREAST: normal without masses, tenderness or nipple discharge and no palpable axillary masses or adenopathy  CV: regular rate and rhythm, normal S1 S2, no S3 or S4, no murmur, click or rub, no peripheral edema and peripheral pulses strong  ABDOMEN: soft, no hepatosplenomegaly, no masses and bowel sounds normal; epigastric tenderness on " palpation   (female): normal female external genitalia, normal urethral meatus, vaginal mucosa pink, moist, well rugated; Cervical bleeding noted when pap obtained, noted cervical polyps and cervical motion tenderness  MS: no gross musculoskeletal defects noted, no edema  SKIN: no suspicious lesions or rashes  NEURO: Normal strength and tone, mentation intact and speech normal  PSYCH: mentation appears normal; anxious and tearful when discussing life stresses  LYMPH: no cervical, supraclavicular, axillary, or inguinal adenopathy    Diagnostic Test Results:  Labs reviewed in Epic  No results found for this or any previous visit (from the past 24 hour(s)).    ASSESSMENT/PLAN:   (Z00.00) Routine general medical examination at a health care facility  (primary encounter diagnosis)  Comment: Follow up with Pap results. Encouraged pt to follow up with OB and Cardiology.   Plan: Return in one year for repeat annual physical.     (J45.20) Mild intermittent asthma without complication  Comment: Pt reports her asthma is well controlled and that she has not had to use her inhaler when being physically active.   Plan: Encouraged pt to continue taking her Symbicort inhaler as needed as the steroid will help provide good asthma control.     (Z86.79) H/O paroxysmal supraventricular tachycardia  Comment: Pt reports having seen cardiology last year.   Plan: TSH with free T4 reflex, Basic metabolic panel         (Ca, Cl, CO2, Creat, Gluc, K, Na, BUN)        Encouraged pt to follow up with cardiology. Will obtain labs to eval in the meantime.     (R09.81) Congestion of paranasal sinus  Comment: Onset of three days ago. Some right ear erythema and throat erythema present.   Plan: Likely viral, provided pt with symptomatic management strategies such as saline water throat gargling and nasal sinus rinse.     (Z13.220) Lipid screening  Comment: Pt is fasting  Plan: Lipid panel reflex to direct LDL Fasting        Obtain fasting lipid  panel.     (R10.13) Epigastric pain  Comment: Reports occasional epigastric pain and burning. Upper abdomen sore upon palpation. Pt reports taking occasional tums.   Plan: omeprazole (PRILOSEC) 20 MG DR capsule        Take for one month to see if pt notes improvement in symptoms. Wonder if this is the cause for the symptoms patient is reporting as cardiac.    (Z12.4) Screening for malignant neoplasm of cervix  Comment: Bleeding and regular-appearing polyps noted on obtaining PAP. Cervical tenderness noted on bimanual exam.   Plan: Pap screen with HPV - recommended age 30 - 65 years        Pt denies hx of abnormal Pap, however states may have had some abnormal result when she was young. Pap sent for testing. Pt referred for OB eval.     (F43.0) Acute reaction to stress  Comment: Pt reporting having to manage many moving parts in family. Sole provider for her family as  left his job. Pt is having to take care of her mother who has multiple medical problems. Pt afraid that with having to take care of everyone she does not have time to take care of herself. Afraid that she may acquire medical conditions she will have to manage.   Plan: Adult Mental Health  Referral        Encouragement and reassurance provided to patients, validated feelings. Encouraged pt to delegate more tasks to  and to find the time to pursue therapy. Stress management counseling given.       Patient has been advised of split billing requirements and indicates understanding: Yes      COUNSELING:  Reviewed preventive health counseling, as reflected in patient instructions  Special attention given to:        Regular exercise       Healthy diet/nutrition       Pneumococcal Vaccine        Alcohol Use        She reports that she has never smoked. She has never used smokeless tobacco.      MARY JO Willams [facility]      Present and preformed physical exam with student nurse-family practice. The  patient was evaluated and managed, by Tiff Rivera RN, [facility] in collaboration with MARY JO Durham, CNP supervising clinical preceptor.    Documentation written by MARY JO Durham CNP

## 2023-06-28 PROCEDURE — G0145 SCR C/V CYTO,THINLAYER,RESCR: HCPCS | Performed by: NURSE PRACTITIONER

## 2023-06-28 PROCEDURE — 87624 HPV HI-RISK TYP POOLED RSLT: CPT | Performed by: NURSE PRACTITIONER

## 2023-07-03 LAB
BKR LAB AP GYN ADEQUACY: NORMAL
BKR LAB AP GYN INTERPRETATION: NORMAL
BKR LAB AP HPV REFLEX: NORMAL
BKR LAB AP LMP: NORMAL
BKR LAB AP PREVIOUS ABNORMAL: NORMAL
PATH REPORT.COMMENTS IMP SPEC: NORMAL
PATH REPORT.COMMENTS IMP SPEC: NORMAL
PATH REPORT.RELEVANT HX SPEC: NORMAL

## 2023-07-05 LAB
HUMAN PAPILLOMA VIRUS 16 DNA: NEGATIVE
HUMAN PAPILLOMA VIRUS 18 DNA: NEGATIVE
HUMAN PAPILLOMA VIRUS FINAL DIAGNOSIS: NORMAL
HUMAN PAPILLOMA VIRUS OTHER HR: NEGATIVE

## 2023-07-12 ENCOUNTER — VIRTUAL VISIT (OUTPATIENT)
Dept: PSYCHOLOGY | Facility: CLINIC | Age: 53
End: 2023-07-12
Attending: NURSE PRACTITIONER
Payer: COMMERCIAL

## 2023-07-12 DIAGNOSIS — F43.22 ADJUSTMENT DISORDER WITH ANXIOUS MOOD: Primary | ICD-10-CM

## 2023-07-12 DIAGNOSIS — F43.0 ACUTE REACTION TO STRESS: ICD-10-CM

## 2023-07-12 PROCEDURE — 90837 PSYTX W PT 60 MINUTES: CPT | Mod: VID

## 2023-07-12 ASSESSMENT — ANXIETY QUESTIONNAIRES
5. BEING SO RESTLESS THAT IT IS HARD TO SIT STILL: NOT AT ALL
6. BECOMING EASILY ANNOYED OR IRRITABLE: SEVERAL DAYS
3. WORRYING TOO MUCH ABOUT DIFFERENT THINGS: SEVERAL DAYS
GAD7 TOTAL SCORE: 6
2. NOT BEING ABLE TO STOP OR CONTROL WORRYING: SEVERAL DAYS
1. FEELING NERVOUS, ANXIOUS, OR ON EDGE: SEVERAL DAYS
7. FEELING AFRAID AS IF SOMETHING AWFUL MIGHT HAPPEN: SEVERAL DAYS
IF YOU CHECKED OFF ANY PROBLEMS ON THIS QUESTIONNAIRE, HOW DIFFICULT HAVE THESE PROBLEMS MADE IT FOR YOU TO DO YOUR WORK, TAKE CARE OF THINGS AT HOME, OR GET ALONG WITH OTHER PEOPLE: NOT DIFFICULT AT ALL
GAD7 TOTAL SCORE: 6
4. TROUBLE RELAXING: SEVERAL DAYS

## 2023-07-12 ASSESSMENT — COLUMBIA-SUICIDE SEVERITY RATING SCALE - C-SSRS
1. HAVE YOU WISHED YOU WERE DEAD OR WISHED YOU COULD GO TO SLEEP AND NOT WAKE UP?: NO
TOTAL  NUMBER OF ABORTED OR SELF INTERRUPTED ATTEMPTS LIFETIME: NO
ATTEMPT LIFETIME: NO
6. HAVE YOU EVER DONE ANYTHING, STARTED TO DO ANYTHING, OR PREPARED TO DO ANYTHING TO END YOUR LIFE?: NO
TOTAL  NUMBER OF INTERRUPTED ATTEMPTS LIFETIME: NO
2. HAVE YOU ACTUALLY HAD ANY THOUGHTS OF KILLING YOURSELF?: NO

## 2023-07-12 ASSESSMENT — PATIENT HEALTH QUESTIONNAIRE - PHQ9
SUM OF ALL RESPONSES TO PHQ QUESTIONS 1-9: 2
SUM OF ALL RESPONSES TO PHQ QUESTIONS 1-9: 2
10. IF YOU CHECKED OFF ANY PROBLEMS, HOW DIFFICULT HAVE THESE PROBLEMS MADE IT FOR YOU TO DO YOUR WORK, TAKE CARE OF THINGS AT HOME, OR GET ALONG WITH OTHER PEOPLE: SOMEWHAT DIFFICULT

## 2023-07-12 NOTE — PROGRESS NOTES
[facility]   Mental Health & Addiction Services     Progress Note - Initial Visit    Patient  Name:  Wanda Orellana Date: 23         Service Type: Individual     Visit Start Time: 9:01 am Visit End Time: 9:55 am     Visit #: 1    Attendees: Client attended alone    Service Modality:  Video Visit:      Provider verified identity through the following two step process.  Patient provided:  Patient     Telemedicine Visit: The patient's condition can be safely assessed and treated via synchronous audio and visual telemedicine encounter.      Reason for Telemedicine Visit: Patient convenience (e.g. access to timely appointments / distance to available provider)    Originating Site (Patient Location): Patient's home    Distant Site (Provider Location): Provider Remote Setting- Home Office    Consent:  The patient/guardian has verbally consented to: the potential risks and benefits of telemedicine (video visit) versus in person care; bill my insurance or make self-payment for services provided; and responsibility for payment of non-covered services.     Patient would like the video invitation sent by:  My Chart    Mode of Communication:  Video Conference via [facility]    Distant Location (Provider):  Off-site    As the provider I attest to compliance with applicable laws and regulations related to telemedicine.     DATA:   Interactive Complexity: No   Crisis: No   Extended Session (53+ minutes):   - There was extensive content to address during this session.  Interactive Complexity: No  Crisis: No     Presenting Concerns/  Current Stressors:  Pt has multiple responsibilities: high profile career, taking care of her mother, family life, and being the head of the household financially. Pt is experiencing acute stress symptoms due to her busy life style. Pt is the primary caregiver to her ill mother and needs to schedule her career and family life around multiple doctors appointments throughout the  week. Pt expressed feeling a bit unappreciated by her mother. Pt reports having night terrors sometimes and is frequently on alert mode in case of a medical emergency. Pt also experiences some stress due to living in a neighborhood with high crime rates.     ASSESSMENT:  Mental Status Assessment:  Appearance:   Appropriate   Eye Contact:   Good   Psychomotor Behavior: Normal   Attitude:   Cooperative   Orientation:   All  Speech   Rate / Production: Normal/ Responsive   Volume:  Normal   Mood:    Normal  Affect:    Appropriate  Tearful  Thought Content:  Clear   Thought Form:  Coherent  Logical   Insight:    Good       Safety Issues and Plan for Safety and Risk Management:   [place] Suicide Severity Rating Scale (Lifetime/Recent)      7/12/2023    10:08 AM   [place] Suicide Severity Rating (Lifetime/Recent)   1. Wish to be Dead (Lifetime) N   2. Non-Specific Active Suicidal Thoughts (Lifetime) N   Actual Attempt (Lifetime) N   Has subject engaged in non-suicidal self-injurious behavior? (Lifetime) N   Interrupted Attempts (Lifetime) N   Aborted or Self-Interrupted Attempt (Lifetime) N   Preparatory Acts or Behavior (Lifetime) N   Calculated C-SSRS Risk Score (Lifetime/Recent) No Risk Indicated     Patient reports the following current fears or concerns for personal safety: Pt's neighborhood has an elevated crime rate. .  Patient denies current or recent suicidal ideation or behaviors.  Patient denies current or recent homicidal ideation or behaviors.  Patient denies current or recent self injurious behavior or ideation.  Patient denies other safety concerns.  Recommended that patient call 911 or go to the local ED should there be a change in any of these risk factors.  Patient reports there are no firearms in the house.     Diagnostic Criteria:  Adjustment Disorder  A. The development of emotional or behavioral symptoms in response to an identifiable stressor(s) occurring within 3 months of the onset of the  stressor(s)  B. These symptoms or behaviors are clinically significant, as evidenced by one or both of the following:       - Marked distress that is out of proportion to the severity/intensity of the stressor (with consideration for external context & culture)  C. The stress-related disturbance does not meet criteria for another disorder & is not not an exacerbation of another mental disorder  D. The symptoms do not represent normal bereavement       * Adjustment Disorder with Anxiety: The predominant manfestations are symptoms such as nervousness, worry, or jitteriness, or, in children separation anxiety from major attachment figures.    DSM5 Diagnoses: (Sustained by DSM5 Criteria Listed Above)  Diagnoses: Adjustment Disorders  309.24 (F43.22) With anxiety  Psychosocial & Contextual Factors: Primary caregiver for her family and mother   Intervention:   Mindfulness- Patient was educated on relaxation and mindfulness techniques  Collateral Reports Completed:  Not Applicable    PLAN: (Homework, other):  1. Provider will continue Diagnostic Assessment.  Patient was given the following to do until next session: Engage in breathing exercises throughout the day.     2. Provider recommended the following referrals: NA      3.  Suicide Risk and Safety Concerns were assessed for Wanda Orellana.    Patient meets the following risk assessment and triage: Patient denied any current/recent/lifetime history of suicidal ideation and/or behaviors.  No safety plan indicated at this time.       Kaya Colon, [facility]  July 12, 2023      Answers for HPI/ROS submitted by the patient on 7/12/2023  If you checked off any problems, how difficult have these problems made it for you to do your work, take care of things at home, or get along with other people?: Somewhat difficult  PHQ9 TOTAL SCORE: 2  SOTO 7 TOTAL SCORE: 6    Note reviewed and clinical supervision by CHANELLE Leavitt, [facility] 7/17/2023

## 2023-08-02 ENCOUNTER — VIRTUAL VISIT (OUTPATIENT)
Dept: PSYCHOLOGY | Facility: CLINIC | Age: 53
End: 2023-08-02
Payer: COMMERCIAL

## 2023-08-02 DIAGNOSIS — F43.22 ADJUSTMENT DISORDER WITH ANXIOUS MOOD: Primary | ICD-10-CM

## 2023-08-02 PROCEDURE — 90837 PSYTX W PT 60 MINUTES: CPT | Mod: VID

## 2023-08-02 NOTE — PROGRESS NOTES
[facility]                                     Progress Note    Patient Name: Wanda Orellana  Date: 23         Service Type: Individual      Session Start Time: 12:01 pm Session End Time: 1:00 pm      Session Length: 59 minutes     Session #: 2    Attendees: Client attended alone    Service Modality:  Video Visit:      Provider verified identity through the following two step process.  Patient provided:  Patient  and Patient address    Telemedicine Visit: The patient's condition can be safely assessed and treated via synchronous audio and visual telemedicine encounter.      Reason for Telemedicine Visit: Patient convenience (e.g. access to timely appointments / distance to available provider)    Originating Site (Patient Location): Patient's home    Distant Site (Provider Location): Provider Remote Setting- Home Office    Consent:  The patient/guardian has verbally consented to: the potential risks and benefits of telemedicine (video visit) versus in person care; bill my insurance or make self-payment for services provided; and responsibility for payment of non-covered services.     Patient would like the video invitation sent by:  My Chart    Mode of Communication:  Video Conference via Amwell    Distant Location (Provider):  Off-site    As the provider I attest to compliance with applicable laws and regulations related to telemedicine.    DATA  Extended Session (53+ minutes):   - Extensive content to cover this session  Interactive Complexity: No  Crisis: No      Progress Since Last Session (Related to Symptoms / Goals / Homework):   Symptoms: Improving pt stated coming back from vacation more refresh.    Homework: Achieved / completed to satisfaction      Episode of Care Goals: Satisfactory progress - ACTION (Actively working towards change); Intervened by reinforcing change plan / affirming steps taken     Current / Ongoing Stressors and Concerns:  Pt reported feeling guilty whenever  she has commitments and can not take her mother. Pt shared struggling when disappointing mom. Pt expressed frustration of having to do everything.       Treatment Objective(s) Addressed in This Session:   use cognitive strategies identified in therapy to challenge anxious thoughts     Intervention:   CBT: Pausing to think before acting.    Assessments completed prior to visit:  The following assessments were completed by patient for this visit:  PHQ9:       3/11/2013    11:30 AM 10/15/2013     3:00 PM 5/23/2016    11:48 AM 7/12/2023     8:52 AM   PHQ-9 SCORE   PHQ-9 Total Score 2 3     PHQ-9 Total Score MyChart    2 (Minimal depression)   PHQ-9 Total Score   3 2     GAD7:       7/12/2023     8:53 AM   SOTO-7 SCORE   Total Score 6 (mild anxiety)   Total Score 6     CAGE-AID:       7/12/2023     9:01 AM   CAGE-AID Total Score   Total Score 0   Total Score MyChart 0 (A total score of 2 or greater is considered clinically significant)     PROMIS 10-Global Health (all questions and answers displayed):       7/12/2023     9:01 AM   PROMIS 10   In general, would you say your health is: Very good   In general, would you say your quality of life is: Very good   In general, how would you rate your physical health? Very good   In general, how would you rate your mental health, including your mood and your ability to think? Very good   In general, how would you rate your satisfaction with your social activities and relationships? Very good   In general, please rate how well you carry out your usual social activities and roles Very good   To what extent are you able to carry out your everyday physical activities such as walking, climbing stairs, carrying groceries, or moving a chair? Mostly   In the past 7 days, how often have you been bothered by emotional problems such as feeling anxious, depressed, or irritable? Rarely   In the past 7 days, how would you rate your fatigue on average? Mild   In the past 7 days, how would you  rate your pain on average, where 0 means no pain, and 10 means worst imaginable pain? 0   In general, would you say your health is: 4   In general, would you say your quality of life is: 4   In general, how would you rate your physical health? 4   In general, how would you rate your mental health, including your mood and your ability to think? 4   In general, how would you rate your satisfaction with your social activities and relationships? 4   In general, please rate how well you carry out your usual social activities and roles. (This includes activities at home, at work and in your community, and responsibilities as a parent, child, spouse, employee, friend, etc.) 4   To what extent are you able to carry out your everyday physical activities such as walking, climbing stairs, carrying groceries, or moving a chair? 4   In the past 7 days, how often have you been bothered by emotional problems such as feeling anxious, depressed, or irritable? 2   In the past 7 days, how would you rate your fatigue on average? 2   In the past 7 days, how would you rate your pain on average, where 0 means no pain, and 10 means worst imaginable pain? 0   Global Mental Health Score 16   Global Physical Health Score 17   PROMIS TOTAL - SUBSCORES 33     Bond Suicide Severity Rating Scale (Lifetime/Recent)      7/12/2023    10:08 AM   Bond Suicide Severity Rating (Lifetime/Recent)   Q1 Wish to be Dead (Lifetime) N   Q2 Non-Specific Active Suicidal Thoughts (Lifetime) N   Actual Attempt (Lifetime) N   Has subject engaged in non-suicidal self-injurious behavior? (Lifetime) N   Interrupted Attempts (Lifetime) N   Aborted or Self-Interrupted Attempt (Lifetime) N   Preparatory Acts or Behavior (Lifetime) N   Calculated C-SSRS Risk Score (Lifetime/Recent) No Risk Indicated         ASSESSMENT: Current Emotional / Mental Status (status of significant symptoms):   Risk status (Self / Other harm or suicidal ideation)   Patient denies current  fears or concerns for personal safety.   Patient denies current or recent suicidal ideation or behaviors.   Patient denies current or recent homicidal ideation or behaviors.   Patient denies current or recent self injurious behavior or ideation.   Patient denies other safety concerns.   Patient reports there has been no change in risk factors since their last session.     Patient reports there has been no change in protective factors since their last session.     Recommended that patient call 911 or go to the local ED should there be a change in any of these risk factors.     Appearance:   Appropriate    Eye Contact:   Good    Psychomotor Behavior: Normal    Attitude:   Cooperative    Orientation:   All   Speech    Rate / Production: Normal     Volume:  Normal    Mood:    Normal   Affect:    Appropriate    Thought Content:  Clear    Thought Form:  Coherent  Logical    Insight:    Good      Medication Review:   No current psychiatric medications prescribed     Medication Compliance:   NA     Changes in Health Issues:   None reported     Chemical Use Review:   Substance Use: Chemical use reviewed, no active concerns identified      Tobacco Use: No current tobacco use.      Diagnosis:  1. Adjustment disorder with anxious mood      Collateral Reports Completed:   Not Applicable    PLAN: (Patient Tasks / Therapist Tasks / Other)  Pt to practice CBT skills and Mindfulness.   Therapist will continue CBT skills       Kaya Colon, SW  August 2, 2023   Note reviewed and clinical supervision by CHANELLE Leavitt, LICSW 8/14/2023

## 2023-08-15 ENCOUNTER — VIRTUAL VISIT (OUTPATIENT)
Dept: PSYCHOLOGY | Facility: CLINIC | Age: 53
End: 2023-08-15
Payer: COMMERCIAL

## 2023-08-15 DIAGNOSIS — F41.1 GENERALIZED ANXIETY DISORDER: Primary | ICD-10-CM

## 2023-08-15 PROCEDURE — 90791 PSYCH DIAGNOSTIC EVALUATION: CPT | Mod: 95

## 2023-08-17 ENCOUNTER — OFFICE VISIT (OUTPATIENT)
Dept: OBGYN | Facility: CLINIC | Age: 53
End: 2023-08-17
Payer: COMMERCIAL

## 2023-08-17 VITALS
OXYGEN SATURATION: 99 % | WEIGHT: 143 LBS | HEART RATE: 88 BPM | SYSTOLIC BLOOD PRESSURE: 140 MMHG | DIASTOLIC BLOOD PRESSURE: 92 MMHG | BODY MASS INDEX: 22.58 KG/M2

## 2023-08-17 DIAGNOSIS — N84.1 CERVICAL POLYP: Primary | ICD-10-CM

## 2023-08-17 PROCEDURE — 99203 OFFICE O/P NEW LOW 30 MIN: CPT | Mod: 25 | Performed by: OBSTETRICS & GYNECOLOGY

## 2023-08-17 PROCEDURE — 88305 TISSUE EXAM BY PATHOLOGIST: CPT | Performed by: STUDENT IN AN ORGANIZED HEALTH CARE EDUCATION/TRAINING PROGRAM

## 2023-08-17 PROCEDURE — 57500 BIOPSY OF CERVIX: CPT | Performed by: OBSTETRICS & GYNECOLOGY

## 2023-08-17 NOTE — PROGRESS NOTES
GYN Clinic Visit    Date of visit: 2023     Chief Complaint:   Chief Complaint   Patient presents with    Other       HPI:   Wanda Orellana is a 53 year old  who presents to clinic today in consultation for cervical polyps seen on pelvic exam with PCP.  Pap obtained at that time was NIL, HPV neg.    Very nervous about what this could mean. Her mom recently had some unexpected health issues arise, so this is very concerning to her.      Denies abnormal uterine bleeding.  Still cycling regularly.    Would like polyps removed today if at all possible.    Medications:  albuterol (PROAIR HFA/PROVENTIL HFA/VENTOLIN HFA) 108 (90 Base) MCG/ACT inhaler, Inhale 2 puffs into the lungs every 6 hours as needed for shortness of breath / dyspnea or wheezing  Cetirizine HCl (ZYRTEC ALLERGY PO), Take 1 tablet by mouth daily  fluticasone (FLONASE) 50 MCG/ACT nasal spray, Spray 1-2 sprays into both nostrils daily  multivitamin, therapeutic with minerals (THERA-VIT-M) TABS, Take 1 tablet by mouth daily  norethindrone-ethinyl estradiol (ALAYCEN 1) 1-35 MG-MCG tablet, Take 1 tablet by mouth daily  vitamin E 400 UNIT TABS, Take 400 Units by mouth daily.  budesonide-formoterol (SYMBICORT) 80-4.5 MCG/ACT Inhaler, Inhale 2 puffs into the lungs 2 times daily (Patient not taking: Reported on 2023)  budesonide-formoterol (SYMBICORT) 80-4.5 MCG/ACT Inhaler, Inhale 2 puffs into the lungs 2 times daily (Patient not taking: Reported on 2023)  Cholecalciferol (VITAMIN D) 2000 UNIT tablet, Take 1 tablet by mouth daily (Patient not taking: Reported on 2022)  diclofenac (VOLTAREN) 1 % topical gel, Apply 2 g topically 4 times daily (Patient not taking: Reported on 2022)  fluticasone (FLOVENT HFA) 220 MCG/ACT inhaler, Inhale 2 puffs into the lungs 2 times daily (Patient not taking: Reported on 2023)    rabies immune globulin 300 units/mL (HYPERAB) injection 1,260 Units        Allergy:  Allergies   Allergen  Reactions    Asa [Aspirin]     Aspirin Buffered Other (See Comments)     Mouth and tongue swelling    Codeine        Obstetric History:   OB History    Para Term  AB Living   2 1 1 0 1 1   SAB IAB Ectopic Multiple Live Births   1 0 0 0 1      # Outcome Date GA Lbr Joss/2nd Weight Sex Delivery Anes PTL Lv   2 Term 13 37w2d 06:00 / 02:20 2.75 kg (6 lb 1 oz) M Vag-Spont EPI N BOWEN      Name: NATHANIEL,ANDRES PATEL      Apgar1: 8  Apgar5: 9   1 SAB 12 10w0d    AB, MISSED Gen  DEC      Birth Comments: suction D&C, trisomy 21.         Past Medical History:   Diagnosis Date    Fracture of metatarsal bone(s), closed     foreign body in foot ?needle?    Headaches     Mild intermittent asthma     exercise induced    Seasonal allergies        Past Surgical History:   Procedure Laterality Date    COLONOSCOPY N/A 2022    Procedure: COLONOSCOPY, WITH POLYPECTOMY;  Surgeon: Alvino Chamberlain MD;  Location: [facility] OR    DILATION AND CURETTAGE SUCTION  2012    Procedure:DILATION AND CURETTAGE SUCTION; Suction Dilation & Curettage   (11 weeks); Surgeon:CHESTER ALVAREZ; Location:[facility] OR    EYE SURGERY      laser surg w/ complications of infections, etc    WISDOM TEETH[         Social History:  Alcohol use  Social History    Substance and Sexual Activity      Alcohol use: Yes        Comment: OCCASSIONALLY- 2 PER MONTH    Tobacco use  History   Smoking Status    Never   Smokeless Tobacco    Never     Drug use  History   Drug Use No     Sexual history  History   Sexual Activity    Sexual activity: Yes    Partners: Male    Birth control/ protection: Pill       Family History   Problem Relation Age of Onset    Diabetes Mother     Arthritis Mother     Heart Failure Mother     Lipids Mother     Obesity Mother     Eye Disorder Mother         retinal myopathy    Hypertension Mother     Kidney Disease Mother     Asthma Mother     Respiratory Mother         emphysema-smoker    Unknown/Adopted Father      Diabetes Maternal Grandmother     Arthritis Maternal Grandmother     Unknown/Adopted Paternal Grandmother     Unknown/Adopted Paternal Grandfather     Asthma Son     Alcohol/Drug Maternal Aunt        Review of Systems:  Per HPI      Physical Exam:  Vitals:    23 1255   BP: (!) 140/92   Pulse: 88   SpO2: 99%   Weight: 64.9 kg (143 lb)       Gen: NAD, Awake and alert, cooperative with exam.  Tearful   Abd: soft, nontender, nondistended, no rebound, no guarding  Extremities: nontender, no edema  : normal appearing external genitalia, intact normal appearing vaginal mucosa without lesions or abnormal discharge; cervix with polypoid lesion from cervical canal.  No spontaneous bleeding.       Procedure:    Written consent obtained for removal of cervical polyp.  Discussed risks of infection and bleeding.  Low risk of malignancy on pathology.    Sterile speculum placed.  Cervix visualized and swabbed with betadine x 3.  Cervical polyp grasped with polyp forceps and twisted until removed.  A small portion of polyp still remained, so procedure repeated.  No further polyp noted after removal.  Silver nitrate applied to base for hemostasis.  Patient did complain of cramping during procedure, but tolerated well overall.  Speculum removed and specimen sent for pathology.    Assessment:  Wanda Orellana is a 53 year old  who presents with chief complaint   Chief Complaint   Patient presents with    Other   .     Diagnosis:   1. Cervical polyp  Uncomplicated cervical polyp removal.  Specimen sent for path.  Aware of low risk of malignancy.  Asked what would be done if malignancy found; would be referred to gyn onc and would presumably have hysterectomy, removal of tubes and ovaries.  Further treatment would depend on further pathology results.  If benign pathology, can just observe and follow-up prn.  - Surgical Pathology Exam  - BIOPSY/EXCIS CERVICAL LESION W/WO FULGURATION      Follow up: prn    Brittanie Billy,  MD

## 2023-08-18 ENCOUNTER — MYC MEDICAL ADVICE (OUTPATIENT)
Dept: OBGYN | Facility: CLINIC | Age: 53
End: 2023-08-18
Payer: COMMERCIAL

## 2023-09-06 ENCOUNTER — OFFICE VISIT (OUTPATIENT)
Dept: DERMATOLOGY | Facility: CLINIC | Age: 53
End: 2023-09-06
Payer: COMMERCIAL

## 2023-09-06 DIAGNOSIS — L81.4 LENTIGINES: ICD-10-CM

## 2023-09-06 DIAGNOSIS — L82.1 SEBORRHEIC KERATOSIS: ICD-10-CM

## 2023-09-06 DIAGNOSIS — D18.01 CHERRY ANGIOMA: ICD-10-CM

## 2023-09-06 DIAGNOSIS — D22.9 MULTIPLE BENIGN NEVI: Primary | ICD-10-CM

## 2023-09-06 PROCEDURE — 99213 OFFICE O/P EST LOW 20 MIN: CPT | Performed by: DERMATOLOGY

## 2023-09-06 ASSESSMENT — PAIN SCALES - GENERAL: PAINLEVEL: NO PAIN (0)

## 2023-09-06 NOTE — PROGRESS NOTES
[facility] Dermatology Note  Encounter Date: Sep 6, 2023  Office Visit      Dermatology Problem List:  # Had near fainting with prior outside biopsy    Soc hx: I also see her mom Lisette.  ____________________________________________    Assessment & Plan:    # Cherry angiomas  # Seborrheic keratoses  - Reassured of benign nature, no treatment necessary    # Multiple benign nevi  # Solar lentigines   - Monitor nevus on R lower abdomen, photo today, suspect collision, notify me for changes and can add on biopsy  - Monitor for ABCDEs of melanoma   - Continue sun protection - recommend SPF 30 or higher with frequent application   - Return sooner if noticing changing or symptomatic lesions     Procedures Performed:   None      Follow-up: 4 month(s) in-person, or earlier for new or changing lesions    Staff:    Keyla Lau MD    Department of Dermatology  [facility]: Phone: [phone], Fax: [phone]  9/6/2023      ____________________________________________    CC: Skin Check (FBSE.  No spots of concern.  Has many moles. )    HPI:  Ms. Wanda Orellana is a(n) 53 year old female who presents today as a new patient for FBSE.     Patient is otherwise feeling well, without additional skin concerns.    Labs Reviewed:  N/A    Physical Exam:  Vitals: LMP 08/09/2023 (Within Days)   SKIN: Total skin excluding the undergarment areas was performed. The exam included the head/face, neck, both arms, chest, back, abdomen, both legs, digits and/or nails.   - Right lower abdomen bilobed 5 mm brown macule, laterally fleshy wobbly and medially more flat, dermoscopy even globules.  - There are dome shaped bright red papules on the trunk and extremities.   - Multiple regular brown pigmented macules and papules are identified on the trunk and extremities.   - Scattered brown macules on sun exposed areas.  - There are waxy  stuck on tan to brown papules on the trunk and extremities.  - No other lesions of concern on areas examined.     Medications:  Current Outpatient Medications   Medication    albuterol (PROAIR HFA/PROVENTIL HFA/VENTOLIN HFA) 108 (90 Base) MCG/ACT inhaler    Cetirizine HCl (ZYRTEC ALLERGY PO)    fluticasone (FLONASE) 50 MCG/ACT nasal spray    multivitamin, therapeutic with minerals (THERA-VIT-M) TABS    norethindrone-ethinyl estradiol (ALAYCEN 1/35) 1-35 MG-MCG tablet    vitamin E 400 UNIT TABS    budesonide-formoterol (SYMBICORT) 80-4.5 MCG/ACT Inhaler    budesonide-formoterol (SYMBICORT) 80-4.5 MCG/ACT Inhaler    Cholecalciferol (VITAMIN D) 2000 UNIT tablet    diclofenac (VOLTAREN) 1 % topical gel    fluticasone (FLOVENT HFA) 220 MCG/ACT inhaler     Current Facility-Administered Medications   Medication    rabies immune globulin 300 units/mL (HYPERAB) injection 1,260 Units      Past Medical History:   Patient Active Problem List   Diagnosis    GERD (gastroesophageal reflux disease)    AMA (advanced maternal age) primigravida 35+    Mucous polyp of cervix    CARDIOVASCULAR SCREENING; LDL GOAL LESS THAN 160    Mild intermittent asthma    Congenital uterine anomaly    H/O paroxysmal supraventricular tachycardia    Chronic seasonal allergic rhinitis due to other allergen     Past Medical History:   Diagnosis Date    Fracture of metatarsal bone(s), closed 11/09    foreign body in foot ?needle?    Headaches     Mild intermittent asthma 2011    exercise induced    Seasonal allergies         CC Carol Bahena, MARY JO CNP  [address]  [place] [address] on close of this encounter.

## 2023-09-06 NOTE — PATIENT INSTRUCTIONS

## 2023-09-06 NOTE — NURSING NOTE
Dermatology Rooming Note    Wanda Orellana's goals for this visit include:   Chief Complaint   Patient presents with    Skin Check     FBSE.  No spots of concern.  Has many moles.      Imani Adames, CMA

## 2023-09-06 NOTE — LETTER
9/6/2023       RE: Wanda Orellana  [address]  [facility] [address]     Dear Colleague,    Thank you for referring your patient, Wanda Orellana, to the [facility] [place] [facility]. Please see a copy of my visit note below.    [facility] Dermatology Note  Encounter Date: Sep 6, 2023  Office Visit      Dermatology Problem List:  # Had near fainting with prior outside biopsy    Soc hx: I also see her mom Lisette.  ____________________________________________    Assessment & Plan:    # Cherry angiomas  # Seborrheic keratoses  - Reassured of benign nature, no treatment necessary    # Multiple benign nevi  # Solar lentigines   - Monitor nevus on R lower abdomen, photo today, suspect collision, notify me for changes and can add on biopsy  - Monitor for ABCDEs of melanoma   - Continue sun protection - recommend SPF 30 or higher with frequent application   - Return sooner if noticing changing or symptomatic lesions     Procedures Performed:   None      Follow-up: 4 month(s) in-person, or earlier for new or changing lesions    Staff:    Keyla Lau MD    Department of Dermatology  [facility]: Phone: [phone], Fax: [phone]  9/6/2023      ____________________________________________    CC: Skin Check (FBSE.  No spots of concern.  Has many moles. )    HPI:  Ms. Wanda Orellana is a(n) 53 year old female who presents today as a new patient for FBSE.     Patient is otherwise feeling well, without additional skin concerns.    Labs Reviewed:  N/A    Physical Exam:  Vitals: LMP 08/09/2023 (Within Days)   SKIN: Total skin excluding the undergarment areas was performed. The exam included the head/face, neck, both arms, chest, back, abdomen, both legs, digits and/or nails.   - Right lower abdomen bilobed 5 mm brown macule,  laterally fleshy wobbly and medially more flat, dermoscopy even globules.  - There are dome shaped bright red papules on the trunk and extremities.   - Multiple regular brown pigmented macules and papules are identified on the trunk and extremities.   - Scattered brown macules on sun exposed areas.  - There are waxy stuck on tan to brown papules on the trunk and extremities.  - No other lesions of concern on areas examined.     Medications:  Current Outpatient Medications   Medication    albuterol (PROAIR HFA/PROVENTIL HFA/VENTOLIN HFA) 108 (90 Base) MCG/ACT inhaler    Cetirizine HCl (ZYRTEC ALLERGY PO)    fluticasone (FLONASE) 50 MCG/ACT nasal spray    multivitamin, therapeutic with minerals (THERA-VIT-M) TABS    norethindrone-ethinyl estradiol (ALAYCEN 1/35) 1-35 MG-MCG tablet    vitamin E 400 UNIT TABS    budesonide-formoterol (SYMBICORT) 80-4.5 MCG/ACT Inhaler    budesonide-formoterol (SYMBICORT) 80-4.5 MCG/ACT Inhaler    Cholecalciferol (VITAMIN D) 2000 UNIT tablet    diclofenac (VOLTAREN) 1 % topical gel    fluticasone (FLOVENT HFA) 220 MCG/ACT inhaler     Current Facility-Administered Medications   Medication    rabies immune globulin 300 units/mL (HYPERAB) injection 1,260 Units      Past Medical History:   Patient Active Problem List   Diagnosis    GERD (gastroesophageal reflux disease)    AMA (advanced maternal age) primigravida 35+    Mucous polyp of cervix    CARDIOVASCULAR SCREENING; LDL GOAL LESS THAN 160    Mild intermittent asthma    Congenital uterine anomaly    H/O paroxysmal supraventricular tachycardia    Chronic seasonal allergic rhinitis due to other allergen     Past Medical History:   Diagnosis Date    Fracture of metatarsal bone(s), closed 11/09    foreign body in foot ?needle?    Headaches     Mild intermittent asthma 2011    exercise induced    Seasonal allergies         CC Carol Bahena, APRN CNP  [address]  [place] [address] on close of this encounter.

## 2023-09-19 NOTE — PROGRESS NOTES
"[facility]    PATIENT'S NAME: Wanda Orellana  PREFERRED NAME: Wanda  PRONOUNS: She/her  MRN: [id number]  : 1970  ADDRESS: [address] [place], [place] [address]T. NUMBER:  [id number]  DATE OF SERVICE: 8/15/23  START TIME: 12:30 pm   END TIME: 1:30 pm   PREFERRED PHONE: [phone]  May we leave a program related message: Yes  SERVICE MODALITY:  Video Visit:      Provider verified identity through the following two step process.  Patient provided:  Patient is known previously to provider    Telemedicine Visit: The patient's condition can be safely assessed and treated via synchronous audio and visual telemedicine encounter.      Reason for Telemedicine Visit: Patient convenience (e.g. access to timely appointments / distance to available provider)    Originating Site (Patient Location): Patient's home    Distant Site (Provider Location): Provider Remote Setting- Home Office    Consent:  The patient/guardian has verbally consented to: the potential risks and benefits of telemedicine (video visit) versus in person care; bill my insurance or make self-payment for services provided; and responsibility for payment of non-covered services.     Patient would like the video invitation sent by:  My Chart    Mode of Communication:  Video Conference via [facility]    Distant Location (Provider):  Off-site    As the provider I attest to compliance with applicable laws and regulations related to telemedicine.    UNIVERSAL ADULT Mental Health DIAGNOSTIC ASSESSMENT    Identifying Information:  Patient is a 53 year old,  individual.  Patient was referred for an assessment by primary care clinic.  Patient attended the session alone.    Chief Complaint:   The reason for seeking services at this time is: \"stress relief\".  The problem(s) began 20.    Patient has not attempted to resolve these concerns in the past.    Social/Family History:  Patient reported they grew up in [place], [place]. They were " raised by biological mother; grandmother. Parents  / . Patient reported that their childhood was challenging.  Patient described their current relationships with family of origin as good.     The patient describes their cultural background as . Cultural influences and impact on patient's life structure, values, norms, and healthcare: none.  Contextual influences on patient's health include: Contextual Factors: Family Factors taking care of her mother. These factors will be addressed in the Preliminary Treatment plan. Patient identified their preferred language to be English. Patient reported they does not need the assistance of an  or other support involved in therapy.     Patient reported had no significant delays in developmental tasks. Patient's highest education level was college graduate. Patient identified the following learning problems: none reported.  Modifications will not be used to assist communication in therapy. Patient reports they are  able to understand written materials.    Patient reported the following relationship history.  Patient's current relationship status is has a partner or significant other for 18 years. Patient identified their sexual orientation as heterosexual. Patient reported having 1 child(jerman). Patient identified partner; friends; other as part of their support system. Patient identified the quality of these relationships as good .      Patient's current living/housing situation involves staying in own home/apartment. The immediate members of family and household include Godfrey Thomas, Chely, pt's mother and son, and they report that housing is stable.    Patient is currently employed fulltime. Patient reports their finances are obtained through employment. Patient does not identify finances as a current stressor.      Patient reported that they have not been involved with the legal system. Patient does not report being under probation/ parole/  jurisdiction. They are not under any current court jurisdiction.     Patient's Strengths and Limitations:  Patient identified the following strengths or resources that will help them succeed in treatment: commitment to health and well being, friends / good social support, family support, insight, intelligence, positive work environment, and motivation. Things that may interfere with the patient's success in treatment include: none identified.     Assessments:  The following assessments were completed by patient for this visit:  PHQ9:       3/11/2013    11:30 AM 10/15/2013     3:00 PM 5/23/2016    11:48 AM 7/12/2023     8:52 AM   PHQ-9 SCORE   PHQ-9 Total Score 2 3     PHQ-9 Total Score MyChart    2 (Minimal depression)   PHQ-9 Total Score   3 2     GAD7:       7/12/2023     8:53 AM   SOTO-7 SCORE   Total Score 6 (mild anxiety)   Total Score 6     CAGE-AID:       7/12/2023     9:01 AM   CAGE-AID Total Score   Total Score 0   Total Score MyChart 0 (A total score of 2 or greater is considered clinically significant)     PROMIS 10-Global Health (all questions and answers displayed):       7/12/2023     9:01 AM   PROMIS 10   In general, would you say your health is: Very good   In general, would you say your quality of life is: Very good   In general, how would you rate your physical health? Very good   In general, how would you rate your mental health, including your mood and your ability to think? Very good   In general, how would you rate your satisfaction with your social activities and relationships? Very good   In general, please rate how well you carry out your usual social activities and roles Very good   To what extent are you able to carry out your everyday physical activities such as walking, climbing stairs, carrying groceries, or moving a chair? Mostly   In the past 7 days, how often have you been bothered by emotional problems such as feeling anxious, depressed, or irritable? Rarely   In the past 7 days, how  would you rate your fatigue on average? Mild   In the past 7 days, how would you rate your pain on average, where 0 means no pain, and 10 means worst imaginable pain? 0   In general, would you say your health is: 4   In general, would you say your quality of life is: 4   In general, how would you rate your physical health? 4   In general, how would you rate your mental health, including your mood and your ability to think? 4   In general, how would you rate your satisfaction with your social activities and relationships? 4   In general, please rate how well you carry out your usual social activities and roles. (This includes activities at home, at work and in your community, and responsibilities as a parent, child, spouse, employee, friend, etc.) 4   To what extent are you able to carry out your everyday physical activities such as walking, climbing stairs, carrying groceries, or moving a chair? 4   In the past 7 days, how often have you been bothered by emotional problems such as feeling anxious, depressed, or irritable? 2   In the past 7 days, how would you rate your fatigue on average? 2   In the past 7 days, how would you rate your pain on average, where 0 means no pain, and 10 means worst imaginable pain? 0   Global Mental Health Score 16   Global Physical Health Score 17   PROMIS TOTAL - SUBSCORES 33     Smock Suicide Severity Rating Scale (Lifetime/Recent)      7/12/2023    10:08 AM   Smock Suicide Severity Rating (Lifetime/Recent)   Q1 Wish to be Dead (Lifetime) N   Q2 Non-Specific Active Suicidal Thoughts (Lifetime) N   Actual Attempt (Lifetime) N   Has subject engaged in non-suicidal self-injurious behavior? (Lifetime) N   Interrupted Attempts (Lifetime) N   Aborted or Self-Interrupted Attempt (Lifetime) N   Preparatory Acts or Behavior (Lifetime) N   Calculated C-SSRS Risk Score (Lifetime/Recent) No Risk Indicated       Personal and Family Medical History:  Patient does not report a family history  of mental health concerns.  Patient reports family history includes Alcohol/Drug in her maternal aunt; Arthritis in her maternal grandmother and mother; Asthma in her mother and son; Diabetes in her maternal grandmother and mother; Eye Disorder in her mother; Heart Failure in her mother; Hypertension in her mother; Kidney Disease in her mother; Lipids in her mother; Obesity in her mother; Respiratory in her mother; Unknown/Adopted in her father, paternal grandfather, and paternal grandmother.     Patient does not report Mental Health Diagnosis or Treatment.      Patient has had a physical exam to rule out medical causes for current symptoms. Date of last physical exam was within the past year. Client was encouraged to follow up with PCP if symptoms were to develop. The patient has a [facility] Provider, who is named Carol Bahena. Patient reports no current medical concerns. Patient denies any issues with pain. There are not significant appetite / nutritional concerns / weight changes. Patient does not report a history of head injury / trauma / cognitive impairment.     Current Outpatient Medications   Medication    albuterol (PROAIR HFA/PROVENTIL HFA/VENTOLIN HFA) 108 (90 Base) MCG/ACT inhaler    budesonide-formoterol (SYMBICORT) 80-4.5 MCG/ACT Inhaler    budesonide-formoterol (SYMBICORT) 80-4.5 MCG/ACT Inhaler    Cetirizine HCl (ZYRTEC ALLERGY PO)    Cholecalciferol (VITAMIN D) 2000 UNIT tablet    diclofenac (VOLTAREN) 1 % topical gel    fluticasone (FLONASE) 50 MCG/ACT nasal spray    fluticasone (FLOVENT HFA) 220 MCG/ACT inhaler    multivitamin, therapeutic with minerals (THERA-VIT-M) TABS    norethindrone-ethinyl estradiol (ALAYCEN 1/35) 1-35 MG-MCG tablet    vitamin E 400 UNIT TABS     Current Facility-Administered Medications   Medication    rabies immune globulin 300 units/mL (HYPERAB) injection 1,260 Units     Medication Adherence:  Patient reports not currently prescribed.  taking prescribed  medications as prescribed.    Patient Allergies:    Allergies   Allergen Reactions    Asa [Aspirin]     Aspirin Buffered Other (See Comments)     Mouth and tongue swelling    Codeine      Medical History:    Past Medical History:   Diagnosis Date    Fracture of metatarsal bone(s), closed 11/09    foreign body in foot ?needle?    Headaches     Mild intermittent asthma 2011    exercise induced    Seasonal allergies      Current Mental Status Exam:   Appearance:  Appropriate    Eye Contact:  Good   Psychomotor:  Normal       Gait / station:  no problem  Attitude / Demeanor: Cooperative   Speech      Rate / Production: Normal/ Responsive      Volume:  Normal  volume      Language:  intact  Mood:   Normal  Affect:   Appropriate  Bright    Thought Content: Clear   Thought Process: Coherent  Logical       Associations: No loosening of associations  Insight:   Good   Judgment:  Intact   Orientation:  All  Attention/concentration: Good    Substance Use:  Patient did report a family history of substance use concerns; see medical history section for details.  Patient has not received chemical dependency treatment in the past.  Patient has not ever been to detox.      Patient is not currently receiving any chemical dependency treatment.     Substance History of use Age of first use Date of last use     Pattern and duration of use (include amounts and frequency)   Alcohol currently use   17 07/08/23 REPORTS SUBSTANCE USE: reports using substance 1 times per month and has 1 drink at a time.   Patient reports heaviest use is current use.   Cannabis   never used     REPORTS SUBSTANCE USE: N/A     Amphetamines   never used     REPORTS SUBSTANCE USE: N/A   Cocaine/crack    never used       REPORTS SUBSTANCE USE: N/A   Hallucinogens never used         REPORTS SUBSTANCE USE: N/A   Inhalants never used         REPORTS SUBSTANCE USE: N/A   Heroin never used         REPORTS SUBSTANCE USE: N/A   Other Opiates never used     REPORTS  SUBSTANCE USE: N/A   Benzodiazepine   never used     REPORTS SUBSTANCE USE: N/A   Barbiturates never used     REPORTS SUBSTANCE USE: N/A   Over the counter meds used in the past who knows 11/24/22 REPORTS SUBSTANCE USE: N/A   Caffeine currently use child?   REPORTS SUBSTANCE USE: reports using substance 1 times per day and has 1 cup at a time.   Patient reports heaviest use is current use.   Nicotine  never used     REPORTS SUBSTANCE USE: N/A   Other substances not listed above:  Identify:  never used     REPORTS SUBSTANCE USE: N/A     Patient reported the following problems as a result of their substance use: no problems, not applicable.  Substance Use: No symptoms  Based on the negative CAGE score and clinical interview there  are not indications of drug or alcohol abuse.    Significant Losses / Trauma / Abuse / Neglect Issues:   Patient did not serve in the .  There are indications or report of significant loss, trauma, abuse or neglect issues related to: death of grandmother (who was pt's primary caregiver) when pt was 18 years old and divorce / relational changes of parents, and moving in with grandmother when pt was in 5th grade.  Concerns for possible neglect are not present.     Safety Assessment:   Patient denies current homicidal ideation and behaviors.  Patient denies current self-injurious ideation and behaviors.    Patient denied risk behaviors associated with substance use.  Patient denies any high risk behaviors associated with mental health symptoms.  Patient reports the following current concerns for their personal safety: None.  Patient reports there are not firearms in the house. There are no firearms in the home..    History of Safety Concerns:  Patient denied a history of homicidal ideation.     Patient denied a history of personal safety concerns.    Patient denied a history of assaultive behaviors.    Patient denied a history of sexual assault behaviors.     Patient denied a history of  risk behaviors associated with substance use.  Patient denies any history of high risk behaviors associated with mental health symptoms.  Patient reports the following protective factors: forward or future oriented thinking; dedication to family or friends; safe and stable environment; regular sleep; effectively controls impulses; regular physical activity; sense of belonging; purpose; secure attachment; daily obligations; structured day; effective problem solving skills; commitment to well being; sense of meaning; positive social skills; financial stability; strong sense of self worth or esteem; sense of personal control or determination; access to a variety of clinical interventions and pets    Risk Plan:  See Recommendations for Safety and Risk Management Plan    Review of Symptoms per patient report:   Depression: Change in sleep and Irritability  Jennifer:  No Symptoms  Psychosis: No Symptoms  Anxiety: Excessive worry, Sleep disturbance, Ruminations, and Irritability  Panic:  No symptoms  Post Traumatic Stress Disorder:  No Symptoms   Eating Disorder: No Symptoms  ADD / ADHD:  No symptoms  Conduct Disorder: No symptoms  Autism Spectrum Disorder: No symptoms  Obsessive Compulsive Disorder: No Symptoms  Patient reports the following compulsive behaviors and treatment history: NA.      Diagnostic Criteria:   Generalized Anxiety Disorder  A. Excessive anxiety and worry about a number of events or activities (such as work or school performance).   B. The person finds it difficult to control the worry.   - Restlessness or feeling keyed up or on edge.    - Being easily fatigued.    - Difficulty concentrating or mind going blank.    - Irritability.    - Muscle tension.    - Sleep disturbance (difficulty falling or staying asleep, or restless unsatisfying sleep).   D. The focus of the anxiety and worry is not confined to features of an Axis I disorder.  E. The anxiety, worry, or physical symptoms cause clinically  significant distress or impairment in social, occupational, or other important areas of functioning.   F. The disturbance is not due to the direct physiological effects of a substance (e.g., a drug of abuse, a medication) or a general medical condition (e.g., hyperthyroidism) and does not occur exclusively during a Mood Disorder, a Psychotic Disorder, or a Pervasive Developmental Disorder.    Functional Status:  Patient reports the following functional impairments:  relationship(s) and self-care.     Nonprogrammatic care:  Patient is requesting basic services to address current mental health concerns.    Clinical Summary:  1. Reason for assessment: Increased stress levels.  2. 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  3. Principal DSM5 Diagnoses  (Sustained by DSM5 Criteria Listed Above):   300.02 (F41.1) Generalized Anxiety Disorder.  4. Other Diagnoses that is relevant to services: NA  5. Provisional Diagnosis:  NA  6. Prognosis: Expect Improvement and Relieve Acute Symptoms.  7. Likely consequences of symptoms if not treated: worsening of anxiety.  8. Client strengths include:  caring, educated, empathetic, employed, goal-focused, insightful, intelligent, motivated, open to learning, open to suggestions / feedback, responsible parent, and support of family, friends and providers .     Recommendations:     1. Plan for Safety and Risk Management:   Safety and Risk: Recommended that patient call 911 or go to the local ED should there be a change in any of these risk factors..          Report to child / adult protection services was NA.     2. Patient's biopsychosocial factors will be explore and acknowledge. Pt's culture and values will be incorporated into treatment.    3. Initial Treatment will focus on:   Anxiety - Cognitive Behavioral Therapy  Adjustment Difficulties related to: taking care of her mother's health and experiencing the stressos of  having her living at her house.     4. Resources/Service Plan:    services are not indicated.   Modifications to assist communication are not indicated.   Additional disability accommodations are not indicated.      5. Collaboration:   Collaboration / coordination of treatment will be initiated with the following  support professionals: primary care physician.      6.  Referrals:   The following referral(s) will be initiated: Outpatient [facility]. Next Scheduled Appointment: Pt will follow up to schedule an appointment.      A Release of Information has been obtained for the following: NA.     Emergency Contact was not obtained.      Clinical Substantiation/medical necessity for the above recommendations: Without ongoing therapy, symptoms will increase and require a higher level of care.   .    7. VERONIKA:    VERONIKA:  Discussed the general effects of drugs and alcohol on health and well-being. Provider gave patient printed information about the  effects of chemical use on their health and well being. Recommendations:  NA .     8. Records:   These were reviewed at time of assessment.   Information in this assessment was obtained from the medical record and  provided by patient who is a good historian.  Patient will have open access to their mental health medical record.    9.   Interactive Complexity: Ela Colon, RON    August 15, 2023  Assessment reviewed and clinical supervision by CHANELLE Leavitt, [facility] 9/19/2023

## 2023-09-21 ENCOUNTER — MYC MEDICAL ADVICE (OUTPATIENT)
Dept: FAMILY MEDICINE | Facility: CLINIC | Age: 53
End: 2023-09-21
Payer: COMMERCIAL

## 2023-09-21 DIAGNOSIS — Z30.41 ENCOUNTER FOR SURVEILLANCE OF CONTRACEPTIVE PILLS: ICD-10-CM

## 2023-09-21 RX ORDER — NORETHINDRONE AND ETHINYL ESTRADIOL 1 MG-35MCG
1 KIT ORAL DAILY
Qty: 84 TABLET | Refills: 4 | Status: SHIPPED | OUTPATIENT
Start: 2023-09-21 | End: 2024-01-17

## 2023-09-21 NOTE — TELEPHONE ENCOUNTER
BP Readings from Last 6 Encounters:   08/17/23 (!) 140/92   06/27/23 126/82   01/24/23 134/86   07/29/22 134/86   07/13/22 102/70   07/05/22 128/84     Elevated BP was during visit with OB-GYN for cervical poyp eval. Other readings below 140/90.  
Requested Prescriptions   Pending Prescriptions Disp Refills    norethindrone-ethinyl estradiol (ALAYCEN 1/35) 1-35 MG-MCG tablet 84 tablet 4     Sig: Take 1 tablet by mouth daily       There is no refill protocol information for this order       Routing refill request to provider for review/approval because:  Drug not on the [facility] refill protocol     Nova Acosta RN  [facility]      
No

## 2023-09-26 ENCOUNTER — PATIENT OUTREACH (OUTPATIENT)
Dept: CARE COORDINATION | Facility: CLINIC | Age: 53
End: 2023-09-26
Payer: COMMERCIAL

## 2023-09-26 ENCOUNTER — TELEPHONE (OUTPATIENT)
Dept: FAMILY MEDICINE | Facility: CLINIC | Age: 53
End: 2023-09-26

## 2023-09-26 NOTE — TELEPHONE ENCOUNTER
Sent mychart to triage sx and need to nasonex since medication is not on patients medication list.   Advised an appt with PCP may be needed to determine appropriate treatment   Please forward to PCP when pt responds    Africa LEO RN  [facility]

## 2023-09-27 NOTE — TELEPHONE ENCOUNTER
LVM requesting call back regarding refill request for nasonex   Not active on med list  Is pt symptomatic? Determine need for rx    Africa LEO RN  [facility]

## 2023-09-28 DIAGNOSIS — J30.9 ALLERGIC RHINITIS, UNSPECIFIED SEASONALITY, UNSPECIFIED TRIGGER: ICD-10-CM

## 2023-09-28 DIAGNOSIS — J45.20 MILD INTERMITTENT ASTHMA WITHOUT COMPLICATION: ICD-10-CM

## 2023-09-28 NOTE — TELEPHONE ENCOUNTER
Hello pt is requesting the nasonex 50mcg I do no see this on the med list can we please get this sent over please

## 2023-09-28 NOTE — TELEPHONE ENCOUNTER
LVM requesting call back to discuss refill request (not on active med list) and triage if experiencing sx   See previous TE from 9/26    Africa LEO RN  [facility]

## 2023-10-03 RX ORDER — FLUTICASONE PROPIONATE 50 MCG
1-2 SPRAY, SUSPENSION (ML) NASAL DAILY
Qty: 48 G | Refills: 1 | Status: SHIPPED | OUTPATIENT
Start: 2023-10-03 | End: 2024-05-31

## 2023-10-03 NOTE — TELEPHONE ENCOUNTER
"Requested Prescriptions   Pending Prescriptions Disp Refills    fluticasone (FLONASE) 50 MCG/ACT nasal spray 48 g 1     Sig: Spray 1-2 sprays into both nostrils daily       Nasal Allergy Protocol Passed - 10/2/2023 12:51 PM        Passed - Patient is age 12 or older        Passed - Recent (12 mo) or future (30 days) visit within the authorizing provider's specialty     Patient has had an office visit with the authorizing provider or a provider within the authorizing providers department within the previous 12 mos or has a future within next 30 days. See \"Patient Info\" tab in inbasket, or \"Choose Columns\" in Meds & Orders section of the refill encounter.            Passed - Medication is active on med list         Prescription approved per [facility] Refill Protocol.  Last annual 6/27/23    Africa LEO RN  [facility]    "

## 2023-10-24 ENCOUNTER — PATIENT OUTREACH (OUTPATIENT)
Dept: CARE COORDINATION | Facility: CLINIC | Age: 53
End: 2023-10-24
Payer: COMMERCIAL

## 2023-11-06 ENCOUNTER — ALLIED HEALTH/NURSE VISIT (OUTPATIENT)
Dept: FAMILY MEDICINE | Facility: CLINIC | Age: 53
End: 2023-11-06
Payer: COMMERCIAL

## 2023-11-06 DIAGNOSIS — Z23 ENCOUNTER FOR IMMUNIZATION: Primary | ICD-10-CM

## 2023-11-06 PROCEDURE — 90471 IMMUNIZATION ADMIN: CPT

## 2023-11-06 PROCEDURE — 91320 SARSCV2 VAC 30MCG TRS-SUC IM: CPT

## 2023-11-06 PROCEDURE — 99207 PR NO CHARGE NURSE ONLY: CPT

## 2023-11-06 PROCEDURE — 90686 IIV4 VACC NO PRSV 0.5 ML IM: CPT

## 2023-11-06 PROCEDURE — 90480 ADMN SARSCOV2 VAC 1/ONLY CMP: CPT

## 2023-11-06 NOTE — PROGRESS NOTES
Prior to immunization administration, verified patients identity using patient s name and date of birth. Please see Immunization Activity for additional information.     Screening Questionnaire for Adult Immunization    Are you sick today?   No   Do you have allergies to medications, food, a vaccine component or latex?   No   Have you ever had a serious reaction after receiving a vaccination?   No   Do you have a long-term health problem with heart, lung, kidney, or metabolic disease (e.g., diabetes), asthma, a blood disorder, no spleen, complement component deficiency, a cochlear implant, or a spinal fluid leak?  Are you on long-term aspirin therapy?   No   Do you have cancer, leukemia, HIV/AIDS, or any other immune system problem?   No   Do you have a parent, brother, or sister with an immune system problem?   No   In the past 3 months, have you taken medications that affect  your immune system, such as prednisone, other steroids, or anticancer drugs; drugs for the treatment of rheumatoid arthritis, Crohn s disease, or psoriasis; or have you had radiation treatments?   No   Have you had a seizure, or a brain or other nervous system problem?   No   During the past year, have you received a transfusion of blood or blood    products, or been given immune (gamma) globulin or antiviral drug?   No   For women: Are you pregnant or is there a chance you could become       pregnant during the next month?   No   Have you received any vaccinations in the past 4 weeks?   No     Immunization questionnaire answers were all negative.    I have reviewed the following standing orders:   This patient is due and qualifies for the Covid-19 vaccine.     Click here for COVID-19 Standing Order    I have reviewed the vaccines inclusion and exclusion criteria; No concerns regarding eligibility.     This patient is due and qualifies for the Influenza vaccine.    Click here for Influenza Vaccine Standing Order    I have reviewed the  vaccines inclusion and exclusion criteria; No concerns regarding eligibility.     Patient instructed to remain in clinic for 15 minutes afterwards, and to report any adverse reactions.     Screening performed by Tani Ramirez RN on 11/6/2023 at 5:10 PM.

## 2023-11-08 ENCOUNTER — MYC MEDICAL ADVICE (OUTPATIENT)
Dept: FAMILY MEDICINE | Facility: CLINIC | Age: 53
End: 2023-11-08
Payer: COMMERCIAL

## 2023-12-01 ENCOUNTER — TELEPHONE (OUTPATIENT)
Dept: CARDIOLOGY | Facility: CLINIC | Age: 53
End: 2023-12-01
Payer: COMMERCIAL

## 2023-12-01 ENCOUNTER — NURSE TRIAGE (OUTPATIENT)
Dept: CARDIOLOGY | Facility: CLINIC | Age: 53
End: 2023-12-01
Payer: COMMERCIAL

## 2023-12-01 DIAGNOSIS — I47.10 PAROXYSMAL SUPRAVENTRICULAR TACHYCARDIA (H): Primary | ICD-10-CM

## 2023-12-01 NOTE — TELEPHONE ENCOUNTER
Can requesting pt wear 14 day biotel monitor. Orders placed and discussed with pt over the phone. We will have clinic coordinator contact her to get this set up.     Antonio Chong RN   Cardiology Nurse Coordinator

## 2023-12-01 NOTE — TELEPHONE ENCOUNTER
Called off waitlist per Juan LEO. Patient pointed out she has a ziopatch being placed on 12/4 and wants to know if that data would need to be back to benefit from a visit.    Per teams messaging to Juan LEO, patient is correct and it is best to keep her 1/8 visit and remove from waitlist.    Yana Cobb on 12/1/2023 at 3:43 PM

## 2023-12-01 NOTE — TELEPHONE ENCOUNTER
"Received a call from the call center to discuss chest tightness, palpitations.  Patient has an appointment 1/8/24 with Dr. Joel Israel.  Spoke to Wanda, who says she called to make an appointment for episodes of heart racing and chest tightness. She says she has been diagnosed with PSVT and has had an increase in episodes recently. She says a couple weeks ago, she had an episode that lasted about 15 minutes and was dizzy.  She says she had an episode yesterday that lasted about 15 minutes and had a little lightheadedness, shortness of breath, and chest tightness. She says the chest tightness continues today even though she is not having palpitations currently.   She says she has not been checking her HR during episodes, but does have a pulse oximeter available, so will monitor with any further episodes.   She says she has never had any testing done for the PSVT, so is wondering if she should.  Advised a message will be sent to [place] [facility] for follow up.    1. DESCRIPTION: \"Please describe your heart rate or heartbeat that you are having\" (e.g., fast/slow, regular/irregular, skipped or extra beats, \"palpitations\") fast  2. ONSET: \"When did it start?\" (Minutes, hours or days)  3. DURATION: \"How long does it last\" (e.g., seconds, minutes, hours) 15 minutes both times  4. PATTERN \"Does it come and go, or has it been constant since it started?\" \"Does it get worse with exertion?\" \"Are you feeling it now?\" Intermittent. Not having currently  5. TAP: \"Using your hand, can you tap out what you are feeling on a chair or table in front of you, so that I can hear?\" (Note: not all patients can do this)  6. HEART RATE: \"Can you tell me your heart rate?\" \"How many beats in 15 seconds?\" (Note: not all patients can do this)   7. RECURRENT SYMPTOM: \"Have you ever had this before?\" If Yes, ask: \"When was the last time?\" and \"What happened that time?\"  8. CAUSE: \"What do you think is causing the palpitations?\"  9. CARDIAC HISTORY: " "\"Do you have any history of heart disease?\" (e.g., heart attack, angina, bypass surgery, angioplasty, arrhythmia) PSVT  10. OTHER SYMPTOMS: \"Do you have any other symptoms?\" (e.g., dizziness, chest pain, sweating, difficulty breathing) dizziness, shortness of breath  11. PREGNANCY: \"Is there any chance you are pregnant?\" \"When was your last menstrual period?\"  Additional Information   Negative: Heart beating very rapidly (e.g., > 140 / minute) and present now  (Exception: During exercise.)   Negative: Difficulty breathing   Negative: Dizziness, lightheadedness, or weakness    Protocols used: Heart Rate and Heartbeat Lvoxwvune-G-VL    "

## 2023-12-04 ENCOUNTER — ANCILLARY PROCEDURE (OUTPATIENT)
Dept: CARDIOLOGY | Facility: CLINIC | Age: 53
End: 2023-12-04
Attending: INTERNAL MEDICINE
Payer: COMMERCIAL

## 2023-12-04 DIAGNOSIS — I47.10 PAROXYSMAL SUPRAVENTRICULAR TACHYCARDIA (H): ICD-10-CM

## 2023-12-04 PROCEDURE — 999N000096 CARDIAC MOBILE TELEMETRY MONITOR

## 2023-12-04 NOTE — PROGRESS NOTES
Per Dr. Israel, patient to have Biotel monitor placed.  Diagnosis: Paroxysmal SVT (I47.10)  Monitor placed: Yes  Patient Instructed: Yes  Patient verbalized understanding: Yes  Monitor #[id number]    Monitor was placed by JACK Griffith  11:56 AM

## 2024-01-05 ENCOUNTER — OFFICE VISIT (OUTPATIENT)
Dept: DERMATOLOGY | Facility: CLINIC | Age: 54
End: 2024-01-05
Payer: COMMERCIAL

## 2024-01-05 DIAGNOSIS — D22.9 MULTIPLE BENIGN NEVI: Primary | ICD-10-CM

## 2024-01-05 PROCEDURE — 99212 OFFICE O/P EST SF 10 MIN: CPT | Performed by: DERMATOLOGY

## 2024-01-05 ASSESSMENT — PAIN SCALES - GENERAL: PAINLEVEL: NO PAIN (0)

## 2024-01-05 NOTE — NURSING NOTE
Dermatology Rooming Note    Wanda Orellana's goals for this visit include:   Chief Complaint   Patient presents with    Derm Problem     Recheck spot on abdomen.      Bethany Garcia RN

## 2024-01-05 NOTE — LETTER
1/5/2024       RE: Wanda Orellana  [address]  [facility] [address]     Dear Colleague,    Thank you for referring your patient, Wanda Orellana, to the [facility] DERMATOLOGY [facility] [place] at [facility]. Please see a copy of my visit note below.    [facility] Dermatology Note  Encounter Date: Jan 5, 2024  Office Visit     Dermatology Problem List:  # Had near fainting with prior outside biopsy  #Shx: I also see her mom Lisette.    # Lesion to monitor:  - Right lower abdomen, photo obtained 09/06/23 - favor collision nevus  - Left temple, photo obtained 01/05/24 - favor benign nevus, cannot exclude BCC  ____________________________________________    Assessment & Plan:    # Lesions to monitor  - Right lower abdomen, photo obtained 09/06/23 - favor collision nevus; reassuringly unchanged from prior photo; continue to monitor  - Left temple, photo obtained 01/05/24 - favor benign nevus, cannot exclude BCC; recheck next visit; notify me sooner for changes/growth      Procedures Performed:   None.    Follow-up: 6 month(s) in-person, or earlier for new or changing lesions    Staff and Scribe:     Scribe Disclosure:   I, RASHAAD SMITH, am serving as a scribe; to document services personally performed by Keyla Lau MD -based on data collection and the provider's statements to me.    Provider Disclosure:   The documentation recorded by the scribe accurately reflects the services I personally performed and the decisions made by me.    Keyla Lau MD    Department of Dermatology  [facility]: Phone: [phone], Fax: [phone]  1/11/2024     ____________________________________________    CC: Derm Problem (Recheck spot on abdomen. )    HPI:  Ms. Wanda Orellana is a(n) 53 year old female who presents today as a return patient for spot  check.    The patient has a spot on her abdomen she would like to be checked.     She has a few moles she would like to be checked. One looks like two moles.    Patient is otherwise feeling well, without additional skin concerns.    Labs Reviewed:  N/A    Physical Exam:  Vitals: There were no vitals taken for this visit.  SKIN: Focused examination of the following was performed.  - Right lower abdomen, 5 mm two toned, somewhat bilobed macule. Unchanged from prior.  - Left temple, fleshy flesh colored papule with non arborizing vessels and wobble sign.   - No other lesions of concern on areas examined.     Medications:  Current Outpatient Medications   Medication    albuterol (PROAIR HFA/PROVENTIL HFA/VENTOLIN HFA) 108 (90 Base) MCG/ACT inhaler    budesonide-formoterol (SYMBICORT) 80-4.5 MCG/ACT Inhaler    budesonide-formoterol (SYMBICORT) 80-4.5 MCG/ACT Inhaler    Cetirizine HCl (ZYRTEC ALLERGY PO)    Cholecalciferol (VITAMIN D) 2000 UNIT tablet    diclofenac (VOLTAREN) 1 % topical gel    fluticasone (FLONASE) 50 MCG/ACT nasal spray    fluticasone (FLOVENT HFA) 220 MCG/ACT inhaler    multivitamin, therapeutic with minerals (THERA-VIT-M) TABS    norethindrone-ethinyl estradiol (ALAYCEN 1/35) 1-35 MG-MCG tablet    vitamin E 400 UNIT TABS     Current Facility-Administered Medications   Medication    rabies immune globulin 300 units/mL (HYPERAB) injection 1,260 Units      Past Medical History:   Patient Active Problem List   Diagnosis    GERD (gastroesophageal reflux disease)    AMA (advanced maternal age) primigravida 35+    Mucous polyp of cervix    CARDIOVASCULAR SCREENING; LDL GOAL LESS THAN 160    Mild intermittent asthma    Congenital uterine anomaly    H/O paroxysmal supraventricular tachycardia    Chronic seasonal allergic rhinitis due to other allergen     Past Medical History:   Diagnosis Date    Fracture of metatarsal bone(s), closed 11/09    foreign body in foot ?needle?    Headaches     Mild intermittent  asthma 2011    exercise induced    Seasonal allergies         CC No referring provider defined for this encounter. on close of this encounter.

## 2024-01-05 NOTE — PROGRESS NOTES
[facility] Dermatology Note  Encounter Date: Jan 5, 2024  Office Visit     Dermatology Problem List:  # Had near fainting with prior outside biopsy  #Shx: I also see her mom Lisette.    # Lesion to monitor:  - Right lower abdomen, photo obtained 09/06/23 - favor collision nevus  - Left temple, photo obtained 01/05/24 - favor benign nevus, cannot exclude BCC  ____________________________________________    Assessment & Plan:    # Lesions to monitor  - Right lower abdomen, photo obtained 09/06/23 - favor collision nevus; reassuringly unchanged from prior photo; continue to monitor  - Left temple, photo obtained 01/05/24 - favor benign nevus, cannot exclude BCC; recheck next visit; notify me sooner for changes/growth      Procedures Performed:   None.    Follow-up: 6 month(s) in-person, or earlier for new or changing lesions    Staff and Scribe:     Scribe Disclosure:   I, RASHAAD SMITH, am serving as a scribe; to document services personally performed by Keyla Lau MD -based on data collection and the provider's statements to me.    Provider Disclosure:   The documentation recorded by the scribe accurately reflects the services I personally performed and the decisions made by me.    Keyla Lau MD    Department of Dermatology  [facility]: Phone: [phone], Fax: [phone]  1/11/2024     ____________________________________________    CC: Derm Problem (Recheck spot on abdomen. )    HPI:  Ms. Wanda Orellana is a(n) 53 year old female who presents today as a return patient for spot check.    The patient has a spot on her abdomen she would like to be checked.     She has a few moles she would like to be checked. One looks like two moles.    Patient is otherwise feeling well, without additional skin concerns.    Labs Reviewed:  N/A    Physical Exam:  Vitals: There were no vitals taken for  this visit.  SKIN: Focused examination of the following was performed.  - Right lower abdomen, 5 mm two toned, somewhat bilobed macule. Unchanged from prior.  - Left temple, fleshy flesh colored papule with non arborizing vessels and wobble sign.   - No other lesions of concern on areas examined.     Medications:  Current Outpatient Medications   Medication    albuterol (PROAIR HFA/PROVENTIL HFA/VENTOLIN HFA) 108 (90 Base) MCG/ACT inhaler    budesonide-formoterol (SYMBICORT) 80-4.5 MCG/ACT Inhaler    budesonide-formoterol (SYMBICORT) 80-4.5 MCG/ACT Inhaler    Cetirizine HCl (ZYRTEC ALLERGY PO)    Cholecalciferol (VITAMIN D) 2000 UNIT tablet    diclofenac (VOLTAREN) 1 % topical gel    fluticasone (FLONASE) 50 MCG/ACT nasal spray    fluticasone (FLOVENT HFA) 220 MCG/ACT inhaler    multivitamin, therapeutic with minerals (THERA-VIT-M) TABS    norethindrone-ethinyl estradiol (ALAYCEN 1/35) 1-35 MG-MCG tablet    vitamin E 400 UNIT TABS     Current Facility-Administered Medications   Medication    rabies immune globulin 300 units/mL (HYPERAB) injection 1,260 Units      Past Medical History:   Patient Active Problem List   Diagnosis    GERD (gastroesophageal reflux disease)    AMA (advanced maternal age) primigravida 35+    Mucous polyp of cervix    CARDIOVASCULAR SCREENING; LDL GOAL LESS THAN 160    Mild intermittent asthma    Congenital uterine anomaly    H/O paroxysmal supraventricular tachycardia    Chronic seasonal allergic rhinitis due to other allergen     Past Medical History:   Diagnosis Date    Fracture of metatarsal bone(s), closed 11/09    foreign body in foot ?needle?    Headaches     Mild intermittent asthma 2011    exercise induced    Seasonal allergies         CC No referring provider defined for this encounter. on close of this encounter.

## 2024-01-08 ENCOUNTER — OFFICE VISIT (OUTPATIENT)
Dept: CARDIOLOGY | Facility: CLINIC | Age: 54
End: 2024-01-08
Attending: INTERNAL MEDICINE
Payer: COMMERCIAL

## 2024-01-08 VITALS
DIASTOLIC BLOOD PRESSURE: 84 MMHG | OXYGEN SATURATION: 100 % | BODY MASS INDEX: 23.81 KG/M2 | SYSTOLIC BLOOD PRESSURE: 133 MMHG | HEART RATE: 81 BPM | WEIGHT: 150.8 LBS

## 2024-01-08 DIAGNOSIS — I47.10 PAROXYSMAL SUPRAVENTRICULAR TACHYCARDIA (H): ICD-10-CM

## 2024-01-08 DIAGNOSIS — R00.2 PALPITATIONS: Primary | ICD-10-CM

## 2024-01-08 DIAGNOSIS — R03.0 ELEVATED BLOOD PRESSURE READING WITHOUT DIAGNOSIS OF HYPERTENSION: ICD-10-CM

## 2024-01-08 PROCEDURE — 99213 OFFICE O/P EST LOW 20 MIN: CPT | Performed by: INTERNAL MEDICINE

## 2024-01-08 PROCEDURE — 99215 OFFICE O/P EST HI 40 MIN: CPT | Mod: 25 | Performed by: INTERNAL MEDICINE

## 2024-01-08 PROCEDURE — 93228 REMOTE 30 DAY ECG REV/REPORT: CPT | Performed by: INTERNAL MEDICINE

## 2024-01-08 PROCEDURE — 999N000096 CARDIAC MOBILE TELEMETRY MONITOR

## 2024-01-08 ASSESSMENT — PAIN SCALES - GENERAL: PAINLEVEL: NO PAIN (0)

## 2024-01-08 NOTE — PATIENT INSTRUCTIONS
You were seen in the Electrophysiology Clinic today by: Dr. Israel    Plan:   Medication Changes: None.      Labs/Tests Needed: 30 day heart monitor and Echocardiogram    Follow up Visit: with Dr. Israel as needed based on testing results.     Further Instructions: Monitor heart rate/rhythm with Kardia device or Apple Watch at home.       If you have further questions, please utilize [employer] to contact us.     Your Care Team:    EP Cardiology   Telephone Number     Nurse Line  Antonio Chong RN    [phone]     For scheduling appointments:   Blank   [phone]   For procedure scheduling:    Tamanna Quintanilla     [phone]   For the Device Clinic (Pacemakers, ICDs, Loop Recorders)    During business hours: [phone]  After business hours:   [phone]- select option 4 and ask for job code [id number].       On-call cardiologist for after hours or on weekends:   [phone], option #4, and ask to speak to the on-call cardiologist.     Cardiovascular Clinic:   [address] [place] [address]. [place] [address]      As always, Thank you for trusting us with your health care needs!

## 2024-01-08 NOTE — NURSING NOTE
Chief Complaint   Patient presents with    Follow Up     Follow up post heart monitor     Vitals were taken and medications reconciled.    Andrzej Shin, EMT  12:01 PM

## 2024-01-08 NOTE — LETTER
1/8/2024      RE: Wanda Orellana  [address]  [facility] [address]       Dear Colleague,    Thank you for the opportunity to participate in the care of your patient, Wanda Orellana, at the [facility] HEART CLINIC [place] at [facility]. Please see a copy of my visit note below.    HPI:   Wanda Orellana is a 51 yo Female with a PMH of Ex-induced asthma, COVID infection in 8/2021 and palpitations.    Patient is being seen today for palpitations follow-up.  She tells me that she has history of fast heart palpitations over the last 10 years.  They are random.  Unpredictable.  Usually last 15 minutes up to 30 minutes or so.  Reports associated dizziness but no syncope, chest pain, shortness of breath.  Otherwise she is pretty active with no exertional symptoms.    She does not smoke.  No energy drinks.  Alcohol occasional.  No prior documented EKG showing cardiac arrhythmia.    Recently wore Zio patch for 2 weeks but she tells me that she did not have any typical episode during the monitoring.    PAST MEDICAL HISTORY:  Past Medical History:   Diagnosis Date     Fracture of metatarsal bone(s), closed 11/09    foreign body in foot ?needle?     Headaches      Mild intermittent asthma 2011    exercise induced     Seasonal allergies        CURRENT MEDICATIONS:  Current Outpatient Medications   Medication Sig Dispense Refill     albuterol (PROAIR HFA/PROVENTIL HFA/VENTOLIN HFA) 108 (90 Base) MCG/ACT inhaler Inhale 2 puffs into the lungs every 6 hours as needed for shortness of breath / dyspnea or wheezing 18 g 11     budesonide-formoterol (SYMBICORT) 80-4.5 MCG/ACT Inhaler Inhale 2 puffs into the lungs 2 times daily       budesonide-formoterol (SYMBICORT) 80-4.5 MCG/ACT Inhaler Inhale 2 puffs into the lungs 2 times daily 10.2 g 11     Cetirizine HCl (ZYRTEC ALLERGY PO) Take 1 tablet by mouth daily       Cholecalciferol (VITAMIN D) 2000 UNIT tablet Take 1 tablet by mouth  daily       diclofenac (VOLTAREN) 1 % topical gel Apply 2 g topically 4 times daily 100 g 0     fluticasone (FLONASE) 50 MCG/ACT nasal spray Spray 1-2 sprays into both nostrils daily 48 g 1     fluticasone (FLOVENT HFA) 220 MCG/ACT inhaler Inhale 2 puffs into the lungs 2 times daily 36 g 3     multivitamin, therapeutic with minerals (THERA-VIT-M) TABS Take 1 tablet by mouth daily       norethindrone-ethinyl estradiol (ALAYCEN 1/35) 1-35 MG-MCG tablet Take 1 tablet by mouth daily 84 tablet 4     vitamin E 400 UNIT TABS Take 400 Units by mouth daily.         PAST SURGICAL HISTORY:  Past Surgical History:   Procedure Laterality Date     COLONOSCOPY N/A 7/13/2022    Procedure: COLONOSCOPY, WITH POLYPECTOMY;  Surgeon: Alvino Chamberlain MD;  Location: [facility] OR     DILATION AND CURETTAGE SUCTION  2/24/2012    Procedure:DILATION AND CURETTAGE SUCTION; Suction Dilation & Curettage   (11 weeks); Surgeon:CHESTER ALVAREZ; Location:[facility] OR     EYE SURGERY  2002    laser surg w/ complications of infections, etc     WISDOM TEETH[         ALLERGIES:     Allergies   Allergen Reactions     Asa [Aspirin]      Aspirin Buffered Other (See Comments)     Mouth and tongue swelling     Codeine        FAMILY HISTORY:  - Premature coronary artery disease  - Atrial fibrillation  - Sudden cardiac death     SOCIAL HISTORY:  Social History     Tobacco Use     Smoking status: Never     Smokeless tobacco: Never   Vaping Use     Vaping Use: Never used   Substance Use Topics     Alcohol use: Yes     Comment: OCCASSIONALLY- 2 PER MONTH     Drug use: No       Exam:  BP (!) 149/90 (BP Location: Left arm, Patient Position: Chair, Cuff Size: Adult Regular)   Pulse 81   Wt 68.4 kg (150 lb 12.8 oz)   SpO2 100%   BMI 23.81 kg/m    GENERAL APPEARANCE: healthy, alert and no distress  HEENT: no icterus  NECK: no adenopathy, no asymmetry, masses, or scars, thyroid normal to palpation and no bruits, JVP not elevated  RESPIRATORY: lungs clear to  auscultation - no rales, rhonchi or wheezes, no use of accessory muscles, no retractions, respirations are unlabored, normal respiratory rate  CARDIOVASCULAR: regular rhythm, normal S1 with physiologic split S2, no S3 or S4 and no murmur, click or rub, precordium quiet with normal PMI.  EXTREMITIES: o bruits  NEURO: alert and oriented to person/place/time, normal speech, gait and affect  SKIN: no ecchymoses, no rashes    Labs:  CBC RESULTS:   Lab Results   Component Value Date    WBC 5.2 09/30/2020    RBC 4.59 09/30/2020    HGB 14.0 09/30/2020    HCT 42.8 09/30/2020    MCV 93 09/30/2020    MCH 30.5 09/30/2020    MCHC 32.7 09/30/2020    RDW 11.9 09/30/2020     09/30/2020       BMP RESULTS:  Lab Results   Component Value Date     06/27/2023     09/30/2020    POTASSIUM 4.4 06/27/2023    POTASSIUM 4.0 04/25/2022    POTASSIUM 4.4 09/30/2020    CHLORIDE 104 06/27/2023    CHLORIDE 106 04/25/2022    CHLORIDE 108 09/30/2020    CO2 24 06/27/2023    CO2 24 04/25/2022    CO2 25 09/30/2020    ANIONGAP 11 06/27/2023    ANIONGAP 7 04/25/2022    ANIONGAP 6 09/30/2020    GLC 93 06/27/2023    GLC 83 04/25/2022    GLC 83 09/30/2020    BUN 8.2 06/27/2023    BUN 9 04/25/2022    BUN 9 09/30/2020    CR 0.85 06/27/2023    CR 0.78 09/30/2020    GFRESTIMATED 81 06/27/2023    GFRESTIMATED 88 09/30/2020    GFRESTBLACK >90 09/30/2020    CORDELL 9.6 06/27/2023    CORDELL 9.2 09/30/2020        ECG on 7/29/2022: Sinus rhythm, no preexcitation.      Assessment and Plan:   # Palpitations (previously noted to have SVT: No documented EKG in the chart)  -Patient reports more frequent and longer lasting palpitations lately.   -Recommend 1 month EKG monitoring  -Discussed home EKG devices like Apple Watch and [employer].  She is interested.  If she captures any of her episodes she is going to send me EKGs through "[employer].    # Elevated blood pressure without diagnosis of hypertension  -Recommend home monitoring and see primary care  physician    Return to clinic to be determined.    Total time spent today for this visit is 41 minutes including precharting, face-to-face clinic visit, review of labs/imaging and medical documentation.     Joel GREER MD  [facility] Division of Cardiology  Pager [phone]

## 2024-01-08 NOTE — PROGRESS NOTES
HPI:   Wanda Orellana is a 53 yo Female with a PMH of Ex-induced asthma, COVID infection in 8/2021 and palpitations.    Patient is being seen today for palpitations follow-up.  She tells me that she has history of fast heart palpitations over the last 10 years.  They are random.  Unpredictable.  Usually last 15 minutes up to 30 minutes or so.  Reports associated dizziness but no syncope, chest pain, shortness of breath.  Otherwise she is pretty active with no exertional symptoms.    She does not smoke.  No energy drinks.  Alcohol occasional.  No prior documented EKG showing cardiac arrhythmia.    Recently wore Zio patch for 2 weeks but she tells me that she did not have any typical episode during the monitoring.    PAST MEDICAL HISTORY:  Past Medical History:   Diagnosis Date    Fracture of metatarsal bone(s), closed 11/09    foreign body in foot ?needle?    Headaches     Mild intermittent asthma 2011    exercise induced    Seasonal allergies        CURRENT MEDICATIONS:  Current Outpatient Medications   Medication Sig Dispense Refill    albuterol (PROAIR HFA/PROVENTIL HFA/VENTOLIN HFA) 108 (90 Base) MCG/ACT inhaler Inhale 2 puffs into the lungs every 6 hours as needed for shortness of breath / dyspnea or wheezing 18 g 11    budesonide-formoterol (SYMBICORT) 80-4.5 MCG/ACT Inhaler Inhale 2 puffs into the lungs 2 times daily      budesonide-formoterol (SYMBICORT) 80-4.5 MCG/ACT Inhaler Inhale 2 puffs into the lungs 2 times daily 10.2 g 11    Cetirizine HCl (ZYRTEC ALLERGY PO) Take 1 tablet by mouth daily      Cholecalciferol (VITAMIN D) 2000 UNIT tablet Take 1 tablet by mouth daily      diclofenac (VOLTAREN) 1 % topical gel Apply 2 g topically 4 times daily 100 g 0    fluticasone (FLONASE) 50 MCG/ACT nasal spray Spray 1-2 sprays into both nostrils daily 48 g 1    fluticasone (FLOVENT HFA) 220 MCG/ACT inhaler Inhale 2 puffs into the lungs 2 times daily 36 g 3    multivitamin, therapeutic with minerals (THERA-VIT-M)  TABS Take 1 tablet by mouth daily      norethindrone-ethinyl estradiol (ALAYCEN 1/35) 1-35 MG-MCG tablet Take 1 tablet by mouth daily 84 tablet 4    vitamin E 400 UNIT TABS Take 400 Units by mouth daily.         PAST SURGICAL HISTORY:  Past Surgical History:   Procedure Laterality Date    COLONOSCOPY N/A 7/13/2022    Procedure: COLONOSCOPY, WITH POLYPECTOMY;  Surgeon: Alvino Chamberlain MD;  Location: [facility] OR    DILATION AND CURETTAGE SUCTION  2/24/2012    Procedure:DILATION AND CURETTAGE SUCTION; Suction Dilation & Curettage   (11 weeks); Surgeon:CHESTER ALVAREZ; Location:[facility]    EYE SURGERY  2002    laser surg w/ complications of infections, etc    WISDOM TEETH[         ALLERGIES:     Allergies   Allergen Reactions    Asa [Aspirin]     Aspirin Buffered Other (See Comments)     Mouth and tongue swelling    Codeine        FAMILY HISTORY:  - Premature coronary artery disease  - Atrial fibrillation  - Sudden cardiac death     SOCIAL HISTORY:  Social History     Tobacco Use    Smoking status: Never    Smokeless tobacco: Never   Vaping Use    Vaping Use: Never used   Substance Use Topics    Alcohol use: Yes     Comment: OCCASSIONALLY- 2 PER MONTH    Drug use: No       Exam:  BP (!) 149/90 (BP Location: Left arm, Patient Position: Chair, Cuff Size: Adult Regular)   Pulse 81   Wt 68.4 kg (150 lb 12.8 oz)   SpO2 100%   BMI 23.81 kg/m    GENERAL APPEARANCE: healthy, alert and no distress  HEENT: no icterus  NECK: no adenopathy, no asymmetry, masses, or scars, thyroid normal to palpation and no bruits, JVP not elevated  RESPIRATORY: lungs clear to auscultation - no rales, rhonchi or wheezes, no use of accessory muscles, no retractions, respirations are unlabored, normal respiratory rate  CARDIOVASCULAR: regular rhythm, normal S1 with physiologic split S2, no S3 or S4 and no murmur, click or rub, precordium quiet with normal PMI.  EXTREMITIES: o bruits  NEURO: alert and oriented to person/place/time, normal speech,  gait and affect  SKIN: no ecchymoses, no rashes    Labs:  CBC RESULTS:   Lab Results   Component Value Date    WBC 5.2 09/30/2020    RBC 4.59 09/30/2020    HGB 14.0 09/30/2020    HCT 42.8 09/30/2020    MCV 93 09/30/2020    MCH 30.5 09/30/2020    MCHC 32.7 09/30/2020    RDW 11.9 09/30/2020     09/30/2020       BMP RESULTS:  Lab Results   Component Value Date     06/27/2023     09/30/2020    POTASSIUM 4.4 06/27/2023    POTASSIUM 4.0 04/25/2022    POTASSIUM 4.4 09/30/2020    CHLORIDE 104 06/27/2023    CHLORIDE 106 04/25/2022    CHLORIDE 108 09/30/2020    CO2 24 06/27/2023    CO2 24 04/25/2022    CO2 25 09/30/2020    ANIONGAP 11 06/27/2023    ANIONGAP 7 04/25/2022    ANIONGAP 6 09/30/2020    GLC 93 06/27/2023    GLC 83 04/25/2022    GLC 83 09/30/2020    BUN 8.2 06/27/2023    BUN 9 04/25/2022    BUN 9 09/30/2020    CR 0.85 06/27/2023    CR 0.78 09/30/2020    GFRESTIMATED 81 06/27/2023    GFRESTIMATED 88 09/30/2020    GFRESTBLACK >90 09/30/2020    CORDELL 9.6 06/27/2023    CORDELL 9.2 09/30/2020        ECG on 7/29/2022: Sinus rhythm, no preexcitation.      Assessment and Plan:   # Palpitations (previously noted to have SVT: No documented EKG in the chart)  -Patient reports more frequent and longer lasting palpitations lately.   -Recommend 1 month EKG monitoring  -Discussed home EKG devices like Apple Watch and [employer].  She is interested.  If she captures any of her episodes she is going to send me EKGs through [employer].    # Elevated blood pressure without diagnosis of hypertension  -Recommend home monitoring and see primary care physician    Return to clinic to be determined.    Total time spent today for this visit is 41 minutes including precharting, face-to-face clinic visit, review of labs/imaging and medical documentation.     Joel GREER MD  [facility] Division of Cardiology  Pager [phone]     Addendum note 2/22/2024:    Patient captured one of her episodes on smart watch. Appear SVT.  Will refer to EP if she prefers ablation otherwise will start on metoprolol.  DR GREER

## 2024-01-08 NOTE — PROGRESS NOTES
Per Dr. Israel, patient to have 30 day biotel monitor placed.  Diagnosis: SVT  Monitor placed: Yes  Patient Instructed: Yes  Patient verbalized understanding: Yes  Holter # [id number]  Giovanny Richards

## 2024-01-10 ENCOUNTER — MYC MEDICAL ADVICE (OUTPATIENT)
Dept: FAMILY MEDICINE | Facility: CLINIC | Age: 54
End: 2024-01-10
Payer: COMMERCIAL

## 2024-01-10 DIAGNOSIS — R06.83 SNORING: Primary | ICD-10-CM

## 2024-01-10 DIAGNOSIS — Z91.89 AT INCREASED RISK FOR EXPOSURE TO RABIES VIRUS: ICD-10-CM

## 2024-01-11 NOTE — TELEPHONE ENCOUNTER
"BP Readings from Last 6 Encounters:   01/08/24 133/84   08/17/23 (!) 140/92   06/27/23 126/82   01/24/23 134/86   07/29/22 134/86   07/13/22 102/70     Wt Readings from Last 5 Encounters:   01/08/24 68.4 kg (150 lb 12.8 oz)   08/17/23 64.9 kg (143 lb)   06/27/23 63.7 kg (140 lb 8 oz)   01/24/23 64.6 kg (142 lb 8 oz)   07/29/22 67.7 kg (149 lb 4.8 oz)     Estimated body mass index is 23.81 kg/m  as calculated from the following:    Height as of 6/27/23: 1.695 m (5' 6.73\").    Weight as of 1/8/24: 68.4 kg (150 lb 12.8 oz).    You can:  ?Lose weight (if you are overweight).  ?Choose a diet rich in fruits, vegetables, and low-fat dairy products, and low in meats, sweets, and refined grains.  ?Eat less salt (sodium).  ?Do something active for at least 30 minutes a day on most days of the week.  ?Limit the amount of alcohol you drink.  "

## 2024-01-17 ENCOUNTER — MYC REFILL (OUTPATIENT)
Dept: FAMILY MEDICINE | Facility: CLINIC | Age: 54
End: 2024-01-17
Payer: COMMERCIAL

## 2024-01-17 DIAGNOSIS — Z30.41 ENCOUNTER FOR SURVEILLANCE OF CONTRACEPTIVE PILLS: ICD-10-CM

## 2024-01-19 RX ORDER — NORETHINDRONE AND ETHINYL ESTRADIOL 1 MG-35MCG
1 KIT ORAL DAILY
Qty: 84 TABLET | Refills: 3 | Status: SHIPPED | OUTPATIENT
Start: 2024-01-19 | End: 2024-03-19

## 2024-01-23 ENCOUNTER — TELEPHONE (OUTPATIENT)
Dept: DERMATOLOGY | Facility: CLINIC | Age: 54
End: 2024-01-23
Payer: COMMERCIAL

## 2024-01-23 NOTE — TELEPHONE ENCOUNTER
Patient Contacted patient stated she will call back and schedule the following:    Appointment type: Return  Provider: Dr. Lau  Return date: 7/5/24  Specialty phone number: [phone]

## 2024-01-28 ENCOUNTER — HEALTH MAINTENANCE LETTER (OUTPATIENT)
Age: 54
End: 2024-01-28

## 2024-02-12 ENCOUNTER — ANCILLARY PROCEDURE (OUTPATIENT)
Dept: CARDIOLOGY | Facility: CLINIC | Age: 54
End: 2024-02-12
Attending: INTERNAL MEDICINE
Payer: COMMERCIAL

## 2024-02-12 ENCOUNTER — LAB (OUTPATIENT)
Dept: LAB | Facility: CLINIC | Age: 54
End: 2024-02-12
Payer: COMMERCIAL

## 2024-02-12 DIAGNOSIS — I47.10 PAROXYSMAL SUPRAVENTRICULAR TACHYCARDIA (H): ICD-10-CM

## 2024-02-12 DIAGNOSIS — Z91.89 AT INCREASED RISK FOR EXPOSURE TO RABIES VIRUS: ICD-10-CM

## 2024-02-12 LAB — LVEF ECHO: NORMAL

## 2024-02-12 PROCEDURE — 99000 SPECIMEN HANDLING OFFICE-LAB: CPT | Performed by: PATHOLOGY

## 2024-02-12 PROCEDURE — 86382 NEUTRALIZATION TEST VIRAL: CPT | Mod: 90 | Performed by: PATHOLOGY

## 2024-02-12 PROCEDURE — 93306 TTE W/DOPPLER COMPLETE: CPT | Performed by: STUDENT IN AN ORGANIZED HEALTH CARE EDUCATION/TRAINING PROGRAM

## 2024-02-12 PROCEDURE — 36415 COLL VENOUS BLD VENIPUNCTURE: CPT | Performed by: PATHOLOGY

## 2024-02-24 LAB — RABV NAB SPEC NT-ACNC: NORMAL

## 2024-03-06 NOTE — MR AVS SNAPSHOT
After Visit Summary   10/26/2017    Wanda Orellana    MRN: [id number]           Patient Information     Date Of Birth          1970        Visit Information        Provider Department      10/26/2017 2:15 PM AUSTIN DUMAS MA/LPN [facility]        Today's Diagnoses     Need for prophylactic vaccination and inoculation against influenza    -  1       Follow-ups after your visit        Your next 10 appointments already scheduled     Oct 26, 2017  2:15 PM CDT   Nurse Only with AUSTIN DUMAS MA/LPN   [facility] ([facility])    [address] [place] [id number] [phone]              Who to contact     If you have questions or need follow up information about today's clinic visit or your schedule please contact [facility] directly at [phone].  Normal or non-critical lab and imaging results will be communicated to you by mydalahart, letter or phone within 4 business days after the clinic has received the results. If you do not hear from us within 7 days, please contact the clinic through mydalahart or phone. If you have a critical or abnormal lab result, we will notify you by phone as soon as possible.  Submit refill requests through [email or web address] or call your pharmacy and they will forward the refill request to us. Please allow 3 business days for your refill to be completed.          Additional Information About Your Visit        MyChart Information     [email or web address] gives you secure access to your electronic health record. If you see a primary care provider, you can also send messages to your care team and make appointments. If you have questions, please call your primary care clinic.  If you do not have a primary care provider, please call [phone] and they will assist you.        Care EveryWhere ID     This is your Care EveryWhere ID. This could be used by other organizations to access your [place] medical records  [id number]    Patient received skilled nursing care today to monitor v/s and cp status.   Caregiver assisting with ADL when needed, shopping and home management  Medications reconciled and the patient comprehend the purpose for each meds  Patient education provided this visit:instructed the patient on disease entity and s/s that indicate deterioation in status   Medication management, taking medications as prescribed   Home safety, fall precautions stress the need to use assistive device when walking to avoid falls or injuries  Call Me First procedure, S/S to report to nurse, physician, and that necessitate calling emergency personnel   Diet/Nutrition instructed on foods high in sodium content and the need to limit sodiun intake with meals  Patient/caregiver response: caregiver and patient verbalized understanding of these instructions  Plan for next home care visit: reinforce teaching and monitor status  The following discharge planning was discussed with the patient/caregiver: home care agency discharge to be completed when goals met. Patient in agreement.   This patient remains homebound. See Homebound Status within Visit Record.       Blood Pressure from Last 3 Encounters:   03/24/17 112/73   01/19/17 117/76   05/23/16 118/80    Weight from Last 3 Encounters:   03/24/17 147 lb 4.8 oz (66.8 kg)   01/19/17 147 lb 11.2 oz (67 kg)   05/23/16 145 lb 1.6 oz (65.8 kg)              We Performed the Following     FLU VAC, SPLIT VIRUS IM > 3 YO (QUADRIVALENT) [39640]     Vaccine Administration, Initial [63390]        Primary Care Provider Office Phone # Fax #    Desire Garrett [phone]       XXX RESIGNED  [address] [facility] [address]  [facility] [address]        Equal Access to Services     MAITE URBINA : Hadii chevy ku hadasho Sochuckyali, waaxda luqadaha, qaybta kaalmada adeegyada, waxay idiin hayderrick leonardo . So [place] [phone].    ATENCIÓN: Si habla español, tiene a danielle disposición servicios gratuitos de asistencia lingüística. [facility] [phone].    We comply with applicable federal civil rights laws and [place] laws. We do not discriminate on the basis of race, color, national origin, age, disability, sex, sexual orientation, or gender identity.            Thank you!     Thank you for choosing [facility]  for your care. Our goal is always to provide you with excellent care. Hearing back from our patients is one way we can continue to improve our services. Please take a few minutes to complete the written survey that you may receive in the mail after your visit with us. Thank you!             Your Updated Medication List - Protect others around you: Learn how to safely use, store and throw away your medicines at www.disposemymeds.org.          This list is accurate as of: 10/26/17  2:09 PM.  Always use your most recent med list.                   Brand Name Dispense Instructions for use Diagnosis    albuterol 108 (90 BASE) MCG/ACT Inhaler    PROAIR HFA/PROVENTIL HFA/VENTOLIN HFA    1 Inhaler    Inhale 2 puffs into the lungs every 6 hours as needed for shortness of breath / dyspnea or wheezing     Influenza B       multivitamin, therapeutic with minerals Tabs tablet      Take 1 tablet by mouth daily        norethindrone-ethinyl estradiol 1-35 MG-MCG per tablet    ORTHO-NOVUM 1-35 TAB,NORTREL 1-35 TAB    84 tablet    Take 1 tablet by mouth daily    Encounter for surveillance of contraceptive pills       olopatadine 0.1 % ophthalmic solution    PATANOL    5 mL    Place 1 drop into both eyes 2 times daily    Eye discharge       vitamin D 2000 UNITS tablet      Take 1 tablet by mouth daily.        vitamin E 400 UNITS Tabs      Take 400 Units by mouth daily.        ZYRTEC ALLERGY PO      Take 1 tablet by mouth daily.

## 2024-03-06 NOTE — RESULT ENCOUNTER NOTE
Wanda,    The way I am reading the rabies titer is that you have antibodies, but the level is not at the acceptable limit. I need to confer with another provider if we need to do additional shots to achieve that level or not.  If you have any questions, please feel free to contact the clinic.    KATHERIN Snatos

## 2024-03-07 DIAGNOSIS — Z92.29 HISTORY OF RABIES VACCINATION: Primary | ICD-10-CM

## 2024-03-07 NOTE — RESULT ENCOUNTER NOTE
"Wanda,    What I can find is that if your titer result is less than 0.5 you \"may need additional doses.\"  I would recommend that you see infectious disease for a consult on how many more to achieve the proper response. I put in a referral for this. If you have any questions, please feel free to contact the clinic.    KATHERIN Santos  "

## 2024-03-08 ENCOUNTER — MYC MEDICAL ADVICE (OUTPATIENT)
Dept: FAMILY MEDICINE | Facility: CLINIC | Age: 54
End: 2024-03-08
Payer: COMMERCIAL

## 2024-03-19 ENCOUNTER — MYC REFILL (OUTPATIENT)
Dept: FAMILY MEDICINE | Facility: CLINIC | Age: 54
End: 2024-03-19
Payer: COMMERCIAL

## 2024-03-19 DIAGNOSIS — Z30.41 ENCOUNTER FOR SURVEILLANCE OF CONTRACEPTIVE PILLS: ICD-10-CM

## 2024-03-19 RX ORDER — NORETHINDRONE AND ETHINYL ESTRADIOL 1 MG-35MCG
1 KIT ORAL DAILY
Qty: 84 TABLET | Refills: 1 | Status: SHIPPED | OUTPATIENT
Start: 2024-03-19 | End: 2024-06-28

## 2024-03-26 NOTE — PROGRESS NOTES
"[facility]  General Infectious Disease/HIV Clinic Note: New Patient     Patient:  Wanda Orellana, Date of birth 1970   Medical record number [id number]  Date of Visit:  03/28/2024   Consult requested by Carol Bahena NP for evaluation of response to rabies vaccine.      Assessment       Possible rabies exposure in 2022, s/p appropriate post-exposure prophylaxis  Current infrequent rabies exposure  Rabies Ab below 1:5    Ms. Orellana presents to ID clinic to discuss a rabies antibody level that is below the level we consider protective. She has a history of an unknown animal bite in 2022 for which she received appropriate post-exposure prophylaxis. She has not had a bite since that time, and her current animals (5 cats) have been vaccinated. She does not work in a high risk field, and in discussion, does not think she would be exposed to rabies without knowing it. Therefore, I would categorize her rabies risk as \"infrequent\" and recommend against routine serologic testing or booster vaccination. It is likely her antibody response has waned with time and lack of re-exposure. In the future, if she is exposed to rabies again, she should receive standard post-exposure prophylaxis for a previously vaccinated person. Though her Ab response was below 1:5, she was not immunocompromised at the time of vaccination (or now), and we do not routinely check antibody levels for persons with infrequent exposure.     We also discussed travel-related vaccinations and medications. She should feel free to make an appointment with me in the future for pre-travel or post-travel evaluation.      Recommendations     No rabies booster indicated at this time  With any future exposure, pt should get routine post-exposure prophylaxis for previously vaccinated persons that were not immunocompromised.   Recommended pt make appointment with me for future travel-related visits.     RTC: PRN    I spent a total of 67 " "minutes on the day of the visit.   Time spent by me doing chart review, history and exam, documentation and further activities per the note    Vanesa Guzman MD  Pager [phone]  Infectious Diseases      History of the Infectious Disease lllness:   CC: Low rabies Ab    HPI: Wanda Orellana is a pleasant 54 year old female with a past medical history of mild intermittent asthma, seasonal allergies, GERD, and pSVT who presents to ID clinic to discuss her rabies antibody level.     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 She currently has 5 cats that she has had for several years (used to have 7, but a few ). These cats have been spayed/neutered and vaccinated against rabies. In , she was putting straw into one of the cat boxes and was bitten on the finger. As it was dark and happened quickly, she was not sure if it was one of the cats that bit her or an opossum (she has seen them and raccoons and squirrels on the property, and eating the cat food). She underwent post-exposure prophylaxis with RiG and rabies vaccination at the time, though she received this a few days after the original injury. She has not had any bites since that time, she does not work in a lab with the rabies virus, and has not been around bats or other animals.     Her rabies antibody level was checked in 2024 due to \"frequent exposure\" per AICP guidelines. The level was  >/= 0.1, which is below the limit that is considered protective against rabies. She was referred to ID clinic to discuss this further.     Social:  Lives with partner and 10 year old son. 5 cats on property. No plans for additional animals.   Traveling to [place] for spring break tomorrow. Travels extensively with friends and family. [place] last , has traveled to [place], [place], [place], [place], [place], etc.      Relevant Microbiology/labs:   24 Rabies Ab: >/= 0.1    Review of Systems: The remainder of a complete ROS was negative, " except as noted in HPI.     Past Medical History:   Diagnosis Date    Fracture of metatarsal bone(s), closed 11/09    foreign body in foot ?needle?    Headaches     Mild intermittent asthma 2011    exercise induced    Seasonal allergies        Past Surgical History:   Procedure Laterality Date    COLONOSCOPY N/A 7/13/2022    Procedure: COLONOSCOPY, WITH POLYPECTOMY;  Surgeon: Alvino Chamberlain MD;  Location: [facility] OR    DILATION AND CURETTAGE SUCTION  2/24/2012    Procedure:DILATION AND CURETTAGE SUCTION; Suction Dilation & Curettage   (11 weeks); Surgeon:CHESTER ALVAREZ; Location:[facility] OR    EYE SURGERY  2002    laser surg w/ complications of infections, etc    WISDOM TEETH[         Family History   Problem Relation Age of Onset    Diabetes Mother     Arthritis Mother     Heart Failure Mother     Lipids Mother     Obesity Mother     Eye Disorder Mother         retinal myopathy    Hypertension Mother     Kidney Disease Mother     Asthma Mother     Respiratory Mother         emphysema-smoker    Unknown/Adopted Father     Diabetes Maternal Grandmother     Arthritis Maternal Grandmother     Unknown/Adopted Paternal Grandmother     Unknown/Adopted Paternal Grandfather     Asthma Son     Alcohol/Drug Maternal Aunt        Social History     Social History Narrative    Caffeine intake/servings daily - 0    Calcium intake/servings daily - 3    Exercise 3+ times weekly - describe biking, cardio    Sunscreen used - Yes    Seatbelts used - Yes    Guns stored in the home - No    Self Breast Exam - No    Pap test up to date -  Yes    Eye exam up to date -  No    Dental exam up to date -  Yes    DEXA scan up to date -  Not Applicable    Flex Sig/Colonoscopy up to date -  Not Applicable    Mammography up to date -  No    Immunizations reviewed and up to date - Yes    Abuse: Current or Past (Physical, Sexual or Emotional) - No    Do you feel safe in your environment - Yes    Do you cope well with stress - Yes    Do you suffer  from insomnia - No    Last updated by: ROBIN BLANCO  1/23/2013                                         Social History     Tobacco Use    Smoking status: Never    Smokeless tobacco: Never   Vaping Use    Vaping Use: Never used   Substance Use Topics    Alcohol use: Yes     Comment: OCCASSIONALLY- 2 PER MONTH    Drug use: No       Immunization History   Administered Date(s) Administered    COVID-19 12+ (2023-24) (Pfizer) 11/06/2023    COVID-19 Bivalent 12+ (Pfizer) 10/04/2022    COVID-19 MONOVALENT 12+ (Pfizer) 04/14/2021, 05/12/2021, 11/30/2021    COVID-19 Monovalent 12+ (Pfizer 2022) 07/05/2022    Hepatitis A (ADULT 19+) 01/05/2006, 08/14/2012    Hepatitis B, Adult 08/14/2012, 06/27/2023    Influenza (IIV3) PF 09/30/2012    Influenza Vaccine 18-64 (Flublok) 09/28/2020, 10/26/2021, 10/04/2022    Influenza Vaccine >6 months,quad, PF 11/14/2014, 02/10/2016, 10/26/2017, 09/07/2018, 11/06/2019, 11/06/2023    Pneumococcal 20 valent Conjugate (Prevnar 20) 06/27/2023    Rabies - IM Diploid Cell Culture 01/05/2022, 01/08/2022, 01/12/2022, 01/19/2022, 02/16/2022    TDAP (Adacel,Boostrix) 01/05/2022    TDAP Vaccine (Adacel) 01/06/2011, 07/29/2013    Td (Adult), Adsorbed 12/31/1999    Typhoid IM 01/05/2006    Typhoid Oral 08/14/2012    Typhoid, Unspecified Formulation 01/05/2006    Zoster recombinant adjuvanted (SHINGRIX) 07/05/2022, 06/27/2023       Patient Active Problem List   Diagnosis    GERD (gastroesophageal reflux disease)    AMA (advanced maternal age) primigravida 35+    Mucous polyp of cervix    CARDIOVASCULAR SCREENING; LDL GOAL LESS THAN 160    Mild intermittent asthma    Congenital uterine anomaly    H/O paroxysmal supraventricular tachycardia    Chronic seasonal allergic rhinitis due to other allergen       Outpatient Medications Marked as Taking for the 3/28/24 encounter (Office Visit) with Vanesa Guzman MD   Medication Sig    Cetirizine HCl (ZYRTEC ALLERGY PO) Take 1 tablet by mouth daily     Cholecalciferol (VITAMIN D) 2000 UNIT tablet Take 1 tablet by mouth daily    fluticasone (FLONASE) 50 MCG/ACT nasal spray Spray 1-2 sprays into both nostrils daily    multivitamin, therapeutic with minerals (THERA-VIT-M) TABS Take 1 tablet by mouth daily    norethindrone-ethinyl estradiol (ALAYCEN 1/35) 1-35 MG-MCG tablet Take 1 tablet by mouth daily    vitamin E 400 UNIT TABS Take 400 Units by mouth daily.     Current Facility-Administered Medications for the 3/28/24 encounter (Office Visit) with Vanesa Guzman MD   Medication    rabies immune globulin 300 units/mL (HYPERAB) injection 1,260 Units       Allergies   Allergen Reactions    Asa [Aspirin]     Aspirin Buffered Other (See Comments)     Mouth and tongue swelling    Codeine             Physical Exam:   BP (!) 151/106 (BP Location: Left arm, Patient Position: Sitting, Cuff Size: Adult Regular)   Pulse 94   Temp 97.9  F (36.6  C) (Oral)   Wt 67.6 kg (149 lb)   SpO2 100%   BMI 23.52 kg/m    Wt Readings from Last 4 Encounters:   01/08/24 68.4 kg (150 lb 12.8 oz)   08/17/23 64.9 kg (143 lb)   06/27/23 63.7 kg (140 lb 8 oz)   01/24/23 64.6 kg (142 lb 8 oz)     Exam:   GENERAL: well-developed, well-nourished, alert, oriented, in no acute distress.  HEAD: Head is normocephalic, atraumatic   EYES: Eyes have anicteric sclerae.    ENT: Oropharynx is moist   NECK: Supple.  LUNGS: Normal WOB on RA.   ABDOMEN: Normal bowel sounds, soft, nontender.  SKIN: No acute rashes.   NEUROLOGIC: Grossly nonfocal.         Laboratory Data:     Metabolic Studies    Recent Labs   Lab Test 06/27/23  0953 04/25/22  1023 04/25/22  1023 09/30/20  0853     --  137 139   POTASSIUM 4.4  --  4.0 4.4   CHLORIDE 104  --  106 108   CO2 24  --  24 25   ANIONGAP 11  --  7 6   BUN 8.2  --  9 9   CR 0.85  --  0.75 0.78   GFRESTIMATED 81   < > >90 88   GLC 93  --  83 83   CORDELL 9.6   < > 9.3 9.2    < > = values in this interval not displayed.     Imaging:   None pertinent to visit.

## 2024-03-28 ENCOUNTER — OFFICE VISIT (OUTPATIENT)
Dept: CT IMAGING | Facility: CLINIC | Age: 54
End: 2024-03-28
Attending: INTERNAL MEDICINE
Payer: COMMERCIAL

## 2024-03-28 VITALS
TEMPERATURE: 97.9 F | BODY MASS INDEX: 23.52 KG/M2 | WEIGHT: 149 LBS | SYSTOLIC BLOOD PRESSURE: 151 MMHG | HEART RATE: 94 BPM | OXYGEN SATURATION: 100 % | DIASTOLIC BLOOD PRESSURE: 106 MMHG

## 2024-03-28 DIAGNOSIS — Z92.29 HISTORY OF RABIES VACCINATION: ICD-10-CM

## 2024-03-28 PROCEDURE — 99205 OFFICE O/P NEW HI 60 MIN: CPT | Performed by: INTERNAL MEDICINE

## 2024-03-28 PROCEDURE — 99213 OFFICE O/P EST LOW 20 MIN: CPT | Performed by: INTERNAL MEDICINE

## 2024-03-28 ASSESSMENT — PAIN SCALES - GENERAL: PAINLEVEL: NO PAIN (0)

## 2024-03-28 NOTE — NURSING NOTE
Chief Complaint   Patient presents with    Consult     History of rabies vaccination, viral, scheduled per pt, referring Carol Bahena APRN CNP     BP (!) 151/106 (BP Location: Left arm, Patient Position: Sitting, Cuff Size: Adult Regular)   Pulse 94   Temp 97.9  F (36.6  C) (Oral)   Wt 67.6 kg (149 lb)   SpO2 100%   BMI 23.52 kg/m

## 2024-04-25 NOTE — TELEPHONE ENCOUNTER
Last appointment: 2/15/24 Juli  Next appointment: None  Previous refill encounter(s): 2/15/24 18g + 1 (last refill 3/26/24)    Requested Prescriptions     Pending Prescriptions Disp Refills    albuterol sulfate HFA (PROVENTIL;VENTOLIN;PROAIR) 108 (90 Base) MCG/ACT inhaler 18 g 1     Sig: Inhale 2 puffs into the lungs every 4 hours as needed for Wheezing     For Pharmacy Admin Tracking Only    Program: Medication Refill  CPA in place:    Recommendation Provided To:   Intervention Detail: New Rx: 1, reason: Patient Preference  Intervention Accepted By:   Gap Closed?:    Time Spent (min): 5     Spoke with pt to discuss her symptoms. Pt states her SVT episodes have increased in frequency and episode length. Her most concerning symptom is the mild chest tightness. I will forward concerns to Dr. Israel and contact pt with her recommendations.     Antonio Chong, PEYTON   Cardiology Nurse Coordinator

## 2024-05-18 NOTE — TELEPHONE ENCOUNTER
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 You were seen for UTI   . A copy of any of your resulted labs, imaging, and findings have been provided to you. Make sure to view any test results that may not have yet resulted at the time of your discharge by creating a FollowMyHealth account at: [email or web address] to sign up for FollowMyHealth.   2. Continue to take your home medications as prescribed.   3. Please follow up with your primary care physician and surgeon for your planne dsurgery. You may call our referrals coordinator at [phone] Monday to Friday 11am-7pm for assistance with making an appointment. Or you can call [phone] to make an appointment.  4. Return to the emergency department for new, persistent, or worsening symptoms or signs, or for any concerning symptoms.   5. For your for health, you should make healthy food choices and be physically active. Also, you should not smoke or use drugs, and you should not drink alcohol in excess. Please visit [email or web address] for resources and more information.    Urinary Tract Infection    A urinary tract infection (UTI) is an infection of any part of the urinary tract, which includes the kidneys, ureters, bladder, and urethra. Risk factors include ignoring your need to urinate, wiping back to front if female, being an uncircumcised male, and having diabetes or a weak immune system. Symptoms include frequent urination, pain or burning with urination, foul smelling urine, cloudy urine, pain in the lower abdomen, blood in the urine, and fever. If you were prescribed an antibiotic medicine, take it as told by your health care provider. Do not stop taking the antibiotic even if you start to feel better.    SEEK IMMEDIATE MEDICAL CARE IF YOU HAVE ANY OF THE FOLLOWING SYMPTOMS: severe back or abdominal pain, fever, inability to keep fluids or medicine down, dizziness/lightheadedness, or a change in mental status.

## 2024-05-28 DIAGNOSIS — J45.21 MILD INTERMITTENT ASTHMA WITH ACUTE EXACERBATION: ICD-10-CM

## 2024-05-29 RX ORDER — FLUTICASONE PROPIONATE 220 UG/1
2 AEROSOL, METERED RESPIRATORY (INHALATION) 2 TIMES DAILY
Qty: 36 G | Refills: 3 | Status: SHIPPED | OUTPATIENT
Start: 2024-05-29 | End: 2024-08-01

## 2024-05-31 DIAGNOSIS — J30.9 ALLERGIC RHINITIS, UNSPECIFIED SEASONALITY, UNSPECIFIED TRIGGER: ICD-10-CM

## 2024-05-31 RX ORDER — FLUTICASONE PROPIONATE 50 MCG
1-2 SPRAY, SUSPENSION (ML) NASAL DAILY
Qty: 48 G | Refills: 1 | Status: SHIPPED | OUTPATIENT
Start: 2024-05-31 | End: 2024-06-28

## 2024-06-28 ENCOUNTER — OFFICE VISIT (OUTPATIENT)
Dept: FAMILY MEDICINE | Facility: CLINIC | Age: 54
End: 2024-06-28
Payer: COMMERCIAL

## 2024-06-28 VITALS
HEART RATE: 79 BPM | HEIGHT: 66 IN | SYSTOLIC BLOOD PRESSURE: 133 MMHG | OXYGEN SATURATION: 99 % | TEMPERATURE: 97.7 F | BODY MASS INDEX: 23.7 KG/M2 | DIASTOLIC BLOOD PRESSURE: 87 MMHG | WEIGHT: 147.5 LBS | RESPIRATION RATE: 19 BRPM

## 2024-06-28 DIAGNOSIS — E55.9 VITAMIN D DEFICIENCY: ICD-10-CM

## 2024-06-28 DIAGNOSIS — Z00.00 ROUTINE GENERAL MEDICAL EXAMINATION AT A HEALTH CARE FACILITY: Primary | ICD-10-CM

## 2024-06-28 DIAGNOSIS — K21.9 GASTROESOPHAGEAL REFLUX DISEASE, UNSPECIFIED WHETHER ESOPHAGITIS PRESENT: ICD-10-CM

## 2024-06-28 DIAGNOSIS — Z30.41 ENCOUNTER FOR SURVEILLANCE OF CONTRACEPTIVE PILLS: ICD-10-CM

## 2024-06-28 DIAGNOSIS — Z86.79 H/O PAROXYSMAL SUPRAVENTRICULAR TACHYCARDIA: ICD-10-CM

## 2024-06-28 DIAGNOSIS — J31.0 CHRONIC RHINITIS: ICD-10-CM

## 2024-06-28 DIAGNOSIS — N89.8 VAGINAL ITCHING: ICD-10-CM

## 2024-06-28 DIAGNOSIS — J45.20 MILD INTERMITTENT ASTHMA WITHOUT COMPLICATION: ICD-10-CM

## 2024-06-28 LAB
CLUE CELLS: ABNORMAL
TRICHOMONAS, WET PREP: ABNORMAL
VIT D+METAB SERPL-MCNC: 65 NG/ML (ref 20–50)
WBC'S/HIGH POWER FIELD, WET PREP: ABNORMAL
YEAST, WET PREP: ABNORMAL

## 2024-06-28 PROCEDURE — 87210 SMEAR WET MOUNT SALINE/INK: CPT | Performed by: NURSE PRACTITIONER

## 2024-06-28 PROCEDURE — 99396 PREV VISIT EST AGE 40-64: CPT | Performed by: NURSE PRACTITIONER

## 2024-06-28 PROCEDURE — 36415 COLL VENOUS BLD VENIPUNCTURE: CPT | Performed by: NURSE PRACTITIONER

## 2024-06-28 PROCEDURE — 99213 OFFICE O/P EST LOW 20 MIN: CPT | Mod: 25 | Performed by: NURSE PRACTITIONER

## 2024-06-28 PROCEDURE — 90471 IMMUNIZATION ADMIN: CPT | Performed by: NURSE PRACTITIONER

## 2024-06-28 PROCEDURE — 82306 VITAMIN D 25 HYDROXY: CPT | Performed by: NURSE PRACTITIONER

## 2024-06-28 PROCEDURE — 90746 HEPB VACCINE 3 DOSE ADULT IM: CPT | Performed by: NURSE PRACTITIONER

## 2024-06-28 RX ORDER — NORETHINDRONE AND ETHINYL ESTRADIOL 1 MG-35MCG
1 KIT ORAL DAILY
Qty: 84 TABLET | Refills: 4 | Status: SHIPPED | OUTPATIENT
Start: 2024-06-28 | End: 2024-09-03

## 2024-06-28 SDOH — HEALTH STABILITY: PHYSICAL HEALTH: ON AVERAGE, HOW MANY DAYS PER WEEK DO YOU ENGAGE IN MODERATE TO STRENUOUS EXERCISE (LIKE A BRISK WALK)?: 3 DAYS

## 2024-06-28 ASSESSMENT — ASTHMA QUESTIONNAIRES
QUESTION_1 LAST FOUR WEEKS HOW MUCH OF THE TIME DID YOUR ASTHMA KEEP YOU FROM GETTING AS MUCH DONE AT WORK, SCHOOL OR AT HOME: NONE OF THE TIME
QUESTION_5 LAST FOUR WEEKS HOW WOULD YOU RATE YOUR ASTHMA CONTROL: COMPLETELY CONTROLLED
QUESTION_3 LAST FOUR WEEKS HOW OFTEN DID YOUR ASTHMA SYMPTOMS (WHEEZING, COUGHING, SHORTNESS OF BREATH, CHEST TIGHTNESS OR PAIN) WAKE YOU UP AT NIGHT OR EARLIER THAN USUAL IN THE MORNING: NOT AT ALL
ACT_TOTALSCORE: 23
QUESTION_4 LAST FOUR WEEKS HOW OFTEN HAVE YOU USED YOUR RESCUE INHALER OR NEBULIZER MEDICATION (SUCH AS ALBUTEROL): ONCE A WEEK OR LESS
QUESTION_2 LAST FOUR WEEKS HOW OFTEN HAVE YOU HAD SHORTNESS OF BREATH: ONCE OR TWICE A WEEK
ACT_TOTALSCORE: 23

## 2024-06-28 ASSESSMENT — SOCIAL DETERMINANTS OF HEALTH (SDOH): HOW OFTEN DO YOU GET TOGETHER WITH FRIENDS OR RELATIVES?: ONCE A WEEK

## 2024-06-28 ASSESSMENT — PAIN SCALES - GENERAL: PAINLEVEL: NO PAIN (0)

## 2024-06-28 NOTE — PATIENT INSTRUCTIONS
"For under the breasts - try an antigunal like clotrimazole or miconazole twice a day  Rinse bras in vinegar bath - tub of water and 1/4 cup vinegar, soak then wash as normal  Air free time without bras  Keep area dry - really good after bathing and wiping up moisture throughout the day      1.\"Eat food.  Not too much.  Mostly plants\" - Jone Pollen - see link below  - Aim for 5-7 servings of vegetables (raw vegetables - 1 serving = 1/2 cup; raw greens - 1 serving = 1 cup)   - Eat grains, but don't make them the superstar of your meal and DO make them whole grains  2. AVOID sugar.  There is no nutritional benefit to sugar.  3. Drink lots of water (avoid juice and soda)  4. MOVE your body daily - even if some days this means 5 minutes of movement. Walking is great for your bones and brain.  Do aim for 150 minutes per week of activity that raises your heart rate, but \"don't let the ideal get in the way of the good enough\"  5. Get good sleep (6-8 hours/night- less sleep is associated with disease/illness/obesity)   6. Smile and laugh every day - even in the face of tragedy  7. Start a meditation or mindfulness practice    CALCIUM: The average recommended intake for women is 2147-7455 mg calcium daily. It is best to get this from your diet (Milk, yogurt, and cheese, vegetables such as Chinese cabbage, kale, spinach and broccoli). If you are not eating enough calcium, then I recommend Calcium Citrate/vitD supplements daily.     Vitamin D is an essential nutritient that many [place] lack, especially in the winter. It is vital for healthy immune system functioning and can be linked to diseases such as obesity, diabetes, body aches/pain, etc. It is super safe and highly beneficial for a [place] to take a vitamin D3 2000 IU once daily during winter months. Daily vitamin D for life is recommended for anyone who has ever been found deficient.    Other things to consider: do not drive while under the influence of " "alcohol, do not drive while texting, always wear a seatbelt, wear a helmet when biking, wear sunscreen to help prevent skin cancer, use DEET mosquito spray when outdoors to prevent mosquito and tick bites, & avoid smoking. If you do get bit by a DEER tick, please contact my office immediately to consider lyme prophylaxis. Dental visits recommended every 6-12 months.    Jone Mccracken's 7 Rules for Eating  [email or web address]/food-recipes/news/85022887/7-rules-for-eating#1    My typical clinic hours are as follows:  Monday 12-5 pm  Tuesday 8am-3pm  Wednesday 7:20am-1pm  Thursday virtual appts 8:20am-12:20 pm  Friday 8-11 am  If you need an appointment urgently and do not find one available on ImmuVen or with Central scheduling, please send me a ImmuVen message and I will work to get you scheduled with myself or a colleague here     Clinic notes  Lab and imaging results are now available for you to view immediately on completion.  Please know that I often have not seen the result before you see results.  I will interpret and discuss the results and meaning of the results as soon as I am able to review them.   ImmuVen is the best way to reach the clinic directly.  When you send a ImmuVen message this will be read by our clinic RNs.  Please know that when you call the clinic number, you are not speaking to a staff member from our local clinic.  You can ask that staff to speak to a clinic staff directly if you wish.  You will be able to read my documentation (\"provider notes\") when I finish up and close your chart.  I encourage you to read through to understand your diagnosis and the plan and how we arrived there.  It is also an opportunity to make sure I fully understood your concerns.    "

## 2024-06-28 NOTE — PROGRESS NOTES
Preventive Care Visit  [facility]  MARY JO Willams CNP, Nurse Practitioner - Family  Jun 28, 2024      Assessment & Plan     Routine general medical examination at a health care facility  Reviewed and updated health maintenance and recommendations      Mild intermittent asthma without complication  Well controlled without daily medication. Symbicort is rescue medication per LORRI guidelines.       1/24/2023    11:17 AM 6/27/2023     8:02 AM 6/28/2024     9:18 AM   ACT Total Scores   ACT TOTAL SCORE (Goal Greater than or Equal to 20) 13 21 23   In the past 12 months, how many times did you visit the emergency room for your asthma without being admitted to the hospital? 0 0 0   In the past 12 months, how many times were you hospitalized overnight because of your asthma? 0 0 0     Chronic rhinitis  Controlled on daily oral antihistamine    Gastroesophageal reflux disease, unspecified whether esophagitis present  Stable - no daily medication    H/O paroxysmal supraventricular tachycardia  Return to cardiology  - Adult Cardiology Eval [facility] Referral; Future    Vitamin D deficiency  Level is 65 - continue current supplementation  - Vitamin D Deficiency; Future  - Vitamin D Deficiency    Encounter for surveillance of contraceptive pills  Continue at this time. As periods cease, if patient wants to consider continuing as HRT, would recommend women's health consult for hormone form switch  - norethindrone-ethinyl estradiol (ALAYCEN 1/35) 1-35 MG-MCG tablet; Take 1 tablet by mouth daily    Vaginal itching  No yeast or BV  - Wet prep - lab collect; Future  - Wet prep - lab collect    Patient has been advised of split billing requirements and indicates understanding: Yes        Counseling  Appropriate preventive services were addressed with this patient via screening, questionnaire, or discussion as appropriate for fall prevention, nutrition, physical activity, Tobacco-use  "cessation, weight loss and cognition.  Checklist reviewing preventive services available has been given to the patient.    Ordering of each unique test  Prescription drug management    Patient Instructions   For under the breasts - try an antigunal like clotrimazole or miconazole twice a day  Rinse bras in vinegar bath - tub of water and 1/4 cup vinegar, soak then wash as normal  Air free time without bras  Keep area dry - really good after bathing and wiping up moisture throughout the day      1.\"Eat food.  Not too much.  Mostly plants\" - Jone Pollen - see link below  - Aim for 5-7 servings of vegetables (raw vegetables - 1 serving = 1/2 cup; raw greens - 1 serving = 1 cup)   - Eat grains, but don't make them the superstar of your meal and DO make them whole grains  2. AVOID sugar.  There is no nutritional benefit to sugar.  3. Drink lots of water (avoid juice and soda)  4. MOVE your body daily - even if some days this means 5 minutes of movement. Walking is great for your bones and brain.  Do aim for 150 minutes per week of activity that raises your heart rate, but \"don't let the ideal get in the way of the good enough\"  5. Get good sleep (6-8 hours/night- less sleep is associated with disease/illness/obesity)   6. Smile and laugh every day - even in the face of tragedy  7. Start a meditation or mindfulness practice    CALCIUM: The average recommended intake for women is 7078-6105 mg calcium daily. It is best to get this from your diet (Milk, yogurt, and cheese, vegetables such as Chinese cabbage, kale, spinach and broccoli). If you are not eating enough calcium, then I recommend Calcium Citrate/vitD supplements daily.     Vitamin D is an essential nutritient that many [place] lack, especially in the winter. It is vital for healthy immune system functioning and can be linked to diseases such as obesity, diabetes, body aches/pain, etc. It is super safe and highly beneficial for a [place] to take a vitamin " "D3 2000 IU once daily during winter months. Daily vitamin D for life is recommended for anyone who has ever been found deficient.    Other things to consider: do not drive while under the influence of alcohol, do not drive while texting, always wear a seatbelt, wear a helmet when biking, wear sunscreen to help prevent skin cancer, use DEET mosquito spray when outdoors to prevent mosquito and tick bites, & avoid smoking. If you do get bit by a DEER tick, please contact my office immediately to consider lyme prophylaxis. Dental visits recommended every 6-12 months.    Jone Mccracken's 7 Rules for Eating  [email or web address]/food-recipes/news/92381870/7-rules-for-eating#1    My typical clinic hours are as follows:  Monday 12-5 pm  Tuesday 8am-3pm  Wednesday 7:20am-1pm  Thursday virtual appts 8:20am-12:20 pm  Friday 8-11 am  If you need an appointment urgently and do not find one available on BrainLAB or with Central scheduling, please send me a BrainLAB message and I will work to get you scheduled with myself or a colleague here     Clinic notes  Lab and imaging results are now available for you to view immediately on completion.  Please know that I often have not seen the result before you see results.  I will interpret and discuss the results and meaning of the results as soon as I am able to review them.   BrainLAB is the best way to reach the clinic directly.  When you send a BrainLAB message this will be read by our clinic RNs.  Please know that when you call the clinic number, you are not speaking to a staff member from our local clinic.  You can ask that staff to speak to a clinic staff directly if you wish.  You will be able to read my documentation (\"provider notes\") when I finish up and close your chart.  I encourage you to read through to understand your diagnosis and the plan and how we arrived there.  It is also an opportunity to make sure I fully understood your concerns.      Sydnie Muñoz is a 54 year " "old, presenting for the following:  Physical        6/28/2024     9:31 AM   Additional Questions   Roomed by Mirella Cortes        Health Care Directive  Patient does not have a Health Care Directive or Living Will: Discussed advance care planning with patient; information given to patient to review.    HPI    HM -    Cancer screenings - planning to get mammogram with mom soon, colonoscopy and pap UTD    Chronic conditions screenings - none indicated   Immunizations - finish Hep B series  Habits  -   Exercise - walking 45 minutes per day      Nutrition - healthy diet with lots of vegetables     Mental health/mindfulness - stable     Alcohol/cigarettes - non-smoker, minimal alcohol a few times a month   Sleep - sleep study upcoming  Sexual health - having regular periods in placebo week of oral contraception; vaginal itching without discharge, no dyspareunia, sex is enjoyable and comfortable  Goals -     History of elevated vitamin D and face tingling. Had been taking vitamin D supplement.     Wt Readings from Last 5 Encounters:   06/28/24 66.9 kg (147 lb 8 oz)   03/28/24 67.6 kg (149 lb)   01/08/24 68.4 kg (150 lb 12.8 oz)   08/17/23 64.9 kg (143 lb)   06/27/23 63.7 kg (140 lb 8 oz)     Estimated body mass index is 23.73 kg/m  as calculated from the following:    Height as of this encounter: 1.679 m (5' 6.1\").    Weight as of this encounter: 66.9 kg (147 lb 8 oz).    Carpal tunnel - went to hand therapy and changed work set-up and has had benefit from these measures    PSVT - episodes will be absent and then have a run for awhile. Formal cardiology testing was normal, but had EKG with [employer] that captured the episode.  diagnosed with cardiology and offered beta-blockers or ablation    Asthma -       1/24/2023    11:17 AM 6/27/2023     8:02 AM 6/28/2024     9:18 AM   ACT Total Scores   ACT TOTAL SCORE (Goal Greater than or Equal to 20) 13 21 23   In the past 12 months, how many times did you visit the emergency " room for your asthma without being admitted to the hospital? 0 0 0   In the past 12 months, how many times were you hospitalized overnight because of your asthma? 0 0 0             6/28/2024   General Health   How would you rate your overall physical health? Good   Feel stress (tense, anxious, or unable to sleep) To some extent      (!) STRESS CONCERN      6/28/2024   Nutrition   Three or more servings of calcium each day? (!) I DON'T KNOW   Diet: Regular (no restrictions)   How many servings of fruit and vegetables per day? (!) 0-1   How many sweetened beverages each day? 0-1            6/28/2024   Exercise   Days per week of moderate/strenous exercise 3 days            6/28/2024   Social Factors   Frequency of gathering with friends or relatives Once a week   Worry food won't last until get money to buy more No   Food not last or not have enough money for food? No   Do you have housing? (Housing is defined as stable permanent housing and does not include staying ouside in a car, in a tent, in an abandoned building, in an overnight shelter, or couch-surfing.) Yes   Are you worried about losing your housing? No   Lack of transportation? No   Unable to get utilities (heat,electricity)? No            6/28/2024   Fall Risk   Fallen 2 or more times in the past year? No   Trouble with walking or balance? No             6/28/2024   Dental   Dentist two times every year? Yes            6/28/2024   TB Screening   Were you born outside of the [place]? No          Today's PHQ-2 Score:       1/5/2024     3:01 PM   PHQ-2 ( 1999 [employer])   Q1: Little interest or pleasure in doing things 0   Q2: Feeling down, depressed or hopeless 0   PHQ-2 Score 0         6/28/2024   Substance Use   Alcohol more than 3/day or more than 7/wk No   Do you use any other substances recreationally? No        Social History     Tobacco Use    Smoking status: Never    Smokeless tobacco: Never   Vaping Use    Vaping status: Never Used   Substance Use Topics     Alcohol use: Yes     Comment: OCCASSIONALLY- 2 PER MONTH    Drug use: No           6/28/2024   Breast Cancer Screening   Family history of breast, colon, or ovarian cancer? No / Unknown          10/26/2022   LAST FHS-7 RESULTS   1st degree relative breast or ovarian cancer No   Any relative bilateral breast cancer No   Any male have breast cancer No   Any ONE woman have BOTH breast AND ovarian cancer No   Any woman with breast cancer before 50yrs No   2 or more relatives with breast AND/OR ovarian cancer No   2 or more relatives with breast AND/OR bowel cancer No        Mammogram Screening - Mammogram every 1-2 years updated in Health Maintenance based on mutual decision making        6/28/2024   STI Screening   New sexual partner(s) since last STI/HIV test? No        History of abnormal Pap smear: No - age 30- 64 PAP with HPV every 5 years recommended        Latest Ref Rng & Units 6/28/2023     8:12 AM 3/20/2019     6:50 PM 3/20/2019     6:05 PM   PAP / HPV   PAP  Negative for Intraepithelial Lesion or Malignancy (NILM)      PAP (Historical)    OTHER-NIL, See Result    HPV 16 DNA Negative Negative  Negative     HPV 18 DNA Negative Negative  Negative     Other HR HPV Negative Negative  Negative       ASCVD Risk   The 10-year ASCVD risk score (Abdullahi GONZALEZ, et al., 2019) is: 1.4%    Values used to calculate the score:      Age: 54 years      Sex: Female      Is Non- : No      Diabetic: No      Tobacco smoker: No      Systolic Blood Pressure: 133 mmHg      Is BP treated: No      HDL Cholesterol: 61 mg/dL      Total Cholesterol: 161 mg/dL      Reviewed and updated as needed this visit by Provider   Tobacco     Med Hx  Surg Hx  Fam Hx  Soc Hx Sexual Activity          Past Medical History:   Diagnosis Date    Fracture of metatarsal bone(s), closed 11/09    foreign body in foot ?needle?    Headaches     Mild intermittent asthma 2011    exercise induced    Seasonal allergies      Past  "Surgical History:   Procedure Laterality Date    COLONOSCOPY N/A 7/13/2022    Procedure: COLONOSCOPY, WITH POLYPECTOMY;  Surgeon: Alvino Chamberlain MD;  Location: [facility] OR    DILATION AND CURETTAGE SUCTION  2/24/2012    Procedure:DILATION AND CURETTAGE SUCTION; Suction Dilation & Curettage   (11 weeks); Surgeon:CHESTER ALVAREZ; Location:[facility] OR    EYE SURGERY  2002    laser surg w/ complications of infections, etc    WISDOM TEETH[           Review of Systems  Constitutional, neuro, ENT, endocrine, pulmonary, cardiac, gastrointestinal, genitourinary, musculoskeletal, integument and psychiatric systems are negative, except as otherwise noted.     Objective    Exam  /87   Pulse 79   Temp 97.7  F (36.5  C) (Temporal)   Resp 19   Ht 1.679 m (5' 6.1\")   Wt 66.9 kg (147 lb 8 oz)   LMP 06/07/2024 (Within Days)   SpO2 99%   BMI 23.73 kg/m     Estimated body mass index is 23.73 kg/m  as calculated from the following:    Height as of this encounter: 1.679 m (5' 6.1\").    Weight as of this encounter: 66.9 kg (147 lb 8 oz).    Physical Exam  GENERAL: alert and no distress  EYES: Eyes grossly normal to inspection, PERRL and conjunctivae and sclerae normal  HENT: ear canals and TM's normal, nose and mouth without ulcers or lesions  NECK: no adenopathy, no asymmetry, masses, or scars  RESP: lungs clear to auscultation - no rales, rhonchi or wheezes  BREAST: normal without masses, tenderness or nipple discharge and no palpable axillary masses or adenopathy  CV: regular rate and rhythm, normal S1 S2, no S3 or S4, no murmur, click or rub, no peripheral edema  ABDOMEN: soft, nontender, no hepatosplenomegaly, no masses and bowel sounds normal  MS: no gross musculoskeletal defects noted, no edema  SKIN: no suspicious lesions or rashes  NEURO: Normal strength and tone, mentation intact and speech normal  PSYCH: mentation appears normal, affect normal/bright        Signed Electronically by: MARY JO Willams CNP    "

## 2024-07-01 NOTE — RESULT ENCOUNTER NOTE
Wanda,    The vitamin D is at a fine level. I wouldn't make any increase or decrease to your supplementation. The wet prep didn't show anything specific like yeast or bacteria infection.  The white blood cells can indicate some type of inflammation or irritation that isn't infection related. If symptoms continue, we should take a look. If you have any questions, please feel free to contact the clinic.    KATHERIN Santos

## 2024-07-12 ENCOUNTER — OFFICE VISIT (OUTPATIENT)
Dept: DERMATOLOGY | Facility: CLINIC | Age: 54
End: 2024-07-12
Payer: COMMERCIAL

## 2024-07-12 DIAGNOSIS — Z12.83 ENCOUNTER FOR SCREENING FOR MALIGNANT NEOPLASM OF SKIN: Primary | ICD-10-CM

## 2024-07-12 DIAGNOSIS — L81.4 LENTIGINES: ICD-10-CM

## 2024-07-12 DIAGNOSIS — L82.1 SEBORRHEIC KERATOSES: ICD-10-CM

## 2024-07-12 DIAGNOSIS — L82.0 INFLAMED SEBORRHEIC KERATOSIS: ICD-10-CM

## 2024-07-12 DIAGNOSIS — D22.9 MULTIPLE NEVI: ICD-10-CM

## 2024-07-12 PROCEDURE — 99213 OFFICE O/P EST LOW 20 MIN: CPT | Mod: 25 | Performed by: PHYSICIAN ASSISTANT

## 2024-07-12 PROCEDURE — 17110 DESTRUCTION B9 LES UP TO 14: CPT | Performed by: PHYSICIAN ASSISTANT

## 2024-07-12 ASSESSMENT — PAIN SCALES - GENERAL: PAINLEVEL: NO PAIN (0)

## 2024-07-12 NOTE — LETTER
7/12/2024       RE: Wanda Orellana  [address]  [facility] [address]     Dear Colleague,    Thank you for referring your patient, Wanda Orellana, to the [facility] [place] at [facility]. Please see a copy of my visit note below.    [facility] Dermatology Note  Encounter Date: Jul 12, 2024  Office Visit     Reviewed patients past medical history and pertinent chart review prior to patients visit today.     Dermatology Problem List:  # Lesions to monitor:  - Right lower abdomen, photo obtained 09/06/23 - favor collision nevus  - Left temple, photo obtained 01/05/24 - favor benign nevus, cannot exclude BCC    No personal history of skin cancer.  No family history of skin cancer.  ____________________________________________    Assessment & Plan:     # Inflamed seborrheic keratoses x6  The benign nature of the skin lesion(s) was discussed with the patient.  Due to the inflamed and irritated nature of the lesions I recommend cryotherapy and the patient was agreeable.      Cryotherapy procedure note, location(s): left inframammary crease x4, right inframammary crease x2 After verbal consent and discussion of risks and benefits including, but not limited to, dyspigmentation/scar, blister, and pain, the lesion(s) was(were) treated with 1-2 mm freeze border for 1-2 cycles with liquid nitrogen. Post cryotherapy instructions were provided.    # Multiple nevi, trunk and extremities  # Solar lentigines  - lesions photographed 09/06/2023 identical to previous photos.   - No concerning features on dermoscopy. We discussed the importance of self exams at home. ABCDE criteria and importance of photoprotection reviewed.     # Cherry angiomas  # Seborrheic keratoses  - We discussed the benign nature of the skin lesions. No treatment required. Continued observation recommended. Follow up with any concerns.      Follow-up:  Annual for  follow up full body skin exam, as needed for new or changing lesions or new concerns    All risks, benefits and alternatives were discussed with patient.  Patient is in agreement and understands the assessment and plan.  All questions were answered.  Mulu León PA-C  [facility] Dermatology    ____________________________________________    CC: Skin Check (Wanda is here today for a skin check )    HPI:  Ms. Wanda Orellana is a(n) 54 year old female who presents today as a return patient for a full body skin cancer screening. The patient requests evaluation of lesions below the breasts. The lesions are very itching and bothersome when they rub against clothing. No other specific cutaneous concerns today. The patient reports trying to be diligent with photoprotection.      Physical Exam:  Vitals: LMP 06/07/2024 (Within Days)   SKIN: Total skin excluding the genitalia areas was performed. The exam included the scalp, face, neck, bilateral arms, chest, back, abdomen, bilateral legs, digits, mons pubis, buttocks, and nails.   - Hanna II.  - The left inframammary crease x4, right inframammary crease x2 demonstrates waxy papule(s) with an erythematous base, consistent with inflamed seborrheic keratoses.   - Multiple tan/brown macules and papules scattered throughout exam, consistent with benign nevi. No concerning features on dermoscopy.   - Scattered tan, homogenous macules scattered on sun exposed skin, consistent with solar lentigines.   - Scattered waxy, stuck on appearing papules and patches, consistent with seborrheic keratoses.    - Several 1-2 mm red dome shaped symmetric papules, consistent with cherry angiomas.     Medications:  Current Outpatient Medications   Medication Sig Dispense Refill     albuterol (PROAIR HFA/PROVENTIL HFA/VENTOLIN HFA) 108 (90 Base) MCG/ACT inhaler Inhale 2 puffs into the lungs every 6 hours as needed for shortness of breath / dyspnea or wheezing 18 g 11     Cetirizine  HCl (ZYRTEC ALLERGY PO) Take 1 tablet by mouth daily       metoprolol succinate ER (TOPROL XL) 25 MG 24 hr tablet Take 1 tablet (25 mg) by mouth daily 90 tablet 3     multivitamin, therapeutic with minerals (THERA-VIT-M) TABS Take 1 tablet by mouth daily       norethindrone-ethinyl estradiol (ALAYCEN 1/35) 1-35 MG-MCG tablet Take 1 tablet by mouth daily 84 tablet 4     vitamin E 400 UNIT TABS Take 400 Units by mouth daily.       budesonide-formoterol (SYMBICORT) 80-4.5 MCG/ACT Inhaler Inhale 2 puffs into the lungs 2 times daily (Patient not taking: Reported on 3/28/2024) 10.2 g 11     fluticasone (FLOVENT HFA) 220 MCG/ACT inhaler Inhale 2 puffs into the lungs 2 times daily (Patient not taking: Reported on 6/28/2024) 36 g 3     Current Facility-Administered Medications   Medication Dose Route Frequency Provider Last Rate Last Admin     rabies immune globulin 300 units/mL (HYPERAB) injection 1,260 Units  20 Units/kg Intramuscular Once Sven Pickens MD          Past Medical History:   Patient Active Problem List   Diagnosis     GERD (gastroesophageal reflux disease)     AMA (advanced maternal age) primigravida 35+     Mucous polyp of cervix     CARDIOVASCULAR SCREENING; LDL GOAL LESS THAN 160     Mild intermittent asthma     Congenital uterine anomaly     H/O paroxysmal supraventricular tachycardia     Chronic rhinitis     Past Medical History:   Diagnosis Date     Fracture of metatarsal bone(s), closed 11/09    foreign body in foot ?needle?     Headaches      Mild intermittent asthma 2011    exercise induced     Seasonal allergies        CC Referred Self, MD  No address on file on close of this encounter.      Again, thank you for allowing me to participate in the care of your patient.      Sincerely,    Mulu León PA-C

## 2024-07-12 NOTE — NURSING NOTE
Dermatology Rooming Note    Wanda Orellana's goals for this visit include:   Chief Complaint   Patient presents with    Skin Check     Wanda is here today for a skin check      KAYLA Rodriguez - Dermatology

## 2024-07-12 NOTE — PROGRESS NOTES
[facility] Dermatology Note  Encounter Date: Jul 12, 2024  Office Visit     Reviewed patients past medical history and pertinent chart review prior to patients visit today.     Dermatology Problem List:  # Lesions to monitor:  - Right lower abdomen, photo obtained 09/06/23 - favor collision nevus  - Left temple, photo obtained 01/05/24 - favor benign nevus, cannot exclude BCC    No personal history of skin cancer.  No family history of skin cancer.  ____________________________________________    Assessment & Plan:     # Inflamed seborrheic keratoses x6  The benign nature of the skin lesion(s) was discussed with the patient.  Due to the inflamed and irritated nature of the lesions I recommend cryotherapy and the patient was agreeable.      Cryotherapy procedure note, location(s): left inframammary crease x4, right inframammary crease x2 After verbal consent and discussion of risks and benefits including, but not limited to, dyspigmentation/scar, blister, and pain, the lesion(s) was(were) treated with 1-2 mm freeze border for 1-2 cycles with liquid nitrogen. Post cryotherapy instructions were provided.    # Multiple nevi, trunk and extremities  # Solar lentigines  - lesions photographed 09/06/2023 identical to previous photos.   - No concerning features on dermoscopy. We discussed the importance of self exams at home. ABCDE criteria and importance of photoprotection reviewed.     # Cherry angiomas  # Seborrheic keratoses  - We discussed the benign nature of the skin lesions. No treatment required. Continued observation recommended. Follow up with any concerns.      Follow-up:  Annual for follow up full body skin exam, as needed for new or changing lesions or new concerns    All risks, benefits and alternatives were discussed with patient.  Patient is in agreement and understands the assessment and plan.  All questions were answered.  Mulu León PA-C  [facility]  Dermatology    ____________________________________________    CC: Skin Check (Wanda is here today for a skin check )    HPI:  Ms. Wanad Orellana is a(n) 54 year old female who presents today as a return patient for a full body skin cancer screening. The patient requests evaluation of lesions below the breasts. The lesions are very itching and bothersome when they rub against clothing. No other specific cutaneous concerns today. The patient reports trying to be diligent with photoprotection.      Physical Exam:  Vitals: LMP 06/07/2024 (Within Days)   SKIN: Total skin excluding the genitalia areas was performed. The exam included the scalp, face, neck, bilateral arms, chest, back, abdomen, bilateral legs, digits, mons pubis, buttocks, and nails.   - Hanna II.  - The left inframammary crease x4, right inframammary crease x2 demonstrates waxy papule(s) with an erythematous base, consistent with inflamed seborrheic keratoses.   - Multiple tan/brown macules and papules scattered throughout exam, consistent with benign nevi. No concerning features on dermoscopy.   - Scattered tan, homogenous macules scattered on sun exposed skin, consistent with solar lentigines.   - Scattered waxy, stuck on appearing papules and patches, consistent with seborrheic keratoses.    - Several 1-2 mm red dome shaped symmetric papules, consistent with cherry angiomas.     Medications:  Current Outpatient Medications   Medication Sig Dispense Refill    albuterol (PROAIR HFA/PROVENTIL HFA/VENTOLIN HFA) 108 (90 Base) MCG/ACT inhaler Inhale 2 puffs into the lungs every 6 hours as needed for shortness of breath / dyspnea or wheezing 18 g 11    Cetirizine HCl (ZYRTEC ALLERGY PO) Take 1 tablet by mouth daily      metoprolol succinate ER (TOPROL XL) 25 MG 24 hr tablet Take 1 tablet (25 mg) by mouth daily 90 tablet 3    multivitamin, therapeutic with minerals (THERA-VIT-M) TABS Take 1 tablet by mouth daily      norethindrone-ethinyl estradiol  (ALAYCEN 1/35) 1-35 MG-MCG tablet Take 1 tablet by mouth daily 84 tablet 4    vitamin E 400 UNIT TABS Take 400 Units by mouth daily.      budesonide-formoterol (SYMBICORT) 80-4.5 MCG/ACT Inhaler Inhale 2 puffs into the lungs 2 times daily (Patient not taking: Reported on 3/28/2024) 10.2 g 11    fluticasone (FLOVENT HFA) 220 MCG/ACT inhaler Inhale 2 puffs into the lungs 2 times daily (Patient not taking: Reported on 6/28/2024) 36 g 3     Current Facility-Administered Medications   Medication Dose Route Frequency Provider Last Rate Last Admin    rabies immune globulin 300 units/mL (HYPERAB) injection 1,260 Units  20 Units/kg Intramuscular Once Sven Pickens MD          Past Medical History:   Patient Active Problem List   Diagnosis    GERD (gastroesophageal reflux disease)    AMA (advanced maternal age) primigravida 35+    Mucous polyp of cervix    CARDIOVASCULAR SCREENING; LDL GOAL LESS THAN 160    Mild intermittent asthma    Congenital uterine anomaly    H/O paroxysmal supraventricular tachycardia    Chronic rhinitis     Past Medical History:   Diagnosis Date    Fracture of metatarsal bone(s), closed 11/09    foreign body in foot ?needle?    Headaches     Mild intermittent asthma 2011    exercise induced    Seasonal allergies        CC Referred Self, MD  No address on file on close of this encounter.

## 2024-07-12 NOTE — PATIENT INSTRUCTIONS
Patient Education        Proper skin care from [facility]:     -Eliminate harsh soaps as they strip the natural oils from the skin, often resulting in dry itchy skin ( i.e. Dial, Zest, Nepali Spring)  -Use mild soaps such as Cetaphil or Dove Sensitive Skin in the shower. You do not need to use soap on arms, legs, and trunk every time you shower unless visibly soiled.   -Avoid hot or cold showers.  -After showering, lightly dry off and apply moisturizing within 2-3 minutes. This will help trap moisture in the skin.   -Aggressive use of a moisturizer at least 1-2 times a day to the entire body (including -Vanicream, Cetaphil, Aquaphor or Cerave) and moisturize hands after every washing.  -We recommend using moisturizers that come in a tub that needs to be scooped out, not a pump. This has more of an oil base. It will hold moisture in your skin much better than a water base moisturizer. The above recommended are non-pore clogging.        Wear a sunscreen with at least SPF 30 on your face, ears, neck and V of the chest daily. Wear sunscreen on other areas of the body if those areas are exposed to the sun throughout the day. Sunscreens can contain physical and/or chemical blockers. Physical blockers are less likely to clog pores, these include zinc oxide and titanium dioxide. Reapply every two hour and after swimming.      Sunscreen examples: https://www.ewg.org/sunscreen/     UV radiation  UVA radiation remains constant throughout the day and throughout the year. It is a longer wavelength than UVB and therefore penetrates deeper into the skin leading to immediate and delayed tanning, photoaging, and skin cancer. 70-80% of UVA and UVB radiation occurs between the hours of 10am-2pm.  UVB radiation  UVB radiation causes the most harmful effects and is more significant during the summer months. However, snow and ice can reflect UVB radiation leading to skin damage during the winter months as well. UVB radiation is  responsible for tanning, burning, inflammation, delayed erythema (pinkness), pigmentation (brown spots), and skin cancer.      I recommend self monthly full body exams and yearly full body exams with a dermatology provider. If you develop a new or changing lesion please follow up for examination. Most skin cancers are pink and scaly or pink and pearly. However, we do see blue/brown/black skin cancers.  Consider the ABCDEs of melanoma when giving yourself your monthly full body exam ( don't forget the groin, buttocks, feet, toes, etc). A-asymmetry, B-borders, C-color, D-diameter, E-elevation or evolving. If you see any of these changes please follow up in clinic. If you cannot see your back I recommend purchasing a hand held mirror to use with a larger wall mirror.       Checking for Skin Cancer  You can find cancer early by checking your skin each month. There are 3 kinds of skin cancer. They are melanoma, basal cell carcinoma, and squamous cell carcinoma. Doing monthly skin checks is the best way to find new marks or skin changes. Follow the instructions below for checking your skin.   The ABCDEs of checking moles for melanoma   Check your moles or growths for signs of melanoma using ABCDE:   Asymmetry: the sides of the mole or growth don t match  Border: the edges are ragged, notched, or blurred  Color: the color within the mole or growth varies  Diameter: the mole or growth is larger than 6 mm (size of a pencil eraser)  Evolving: the size, shape, or color of the mole or growth is changing (evolving is not shown in the images below)    Checking for other types of skin cancer  Basal cell carcinoma or squamous cell carcinoma have symptoms such as:      A spot or mole that looks different from all other marks on your skin  Changes in how an area feels, such as itching, tenderness, or pain  Changes in the skin's surface, such as oozing, bleeding, or scaliness  A sore that does not heal  New swelling or redness beyond  the border of a mole     Who s at risk?  Anyone can get skin cancer. But you are at greater risk if you have:   Fair skin, light-colored hair, or light-colored eyes  Many moles or abnormal moles on your skin  A history of sunburns from sunlight or tanning beds  A family history of skin cancer  A history of exposure to radiation or chemicals  A weakened immune system  If you have had skin cancer in the past, you are at risk for recurring skin cancer.   How to check your skin  Do your monthly skin checkups in front of a full-length mirror. Check all parts of your body, including your:   Head (ears, face, neck, and scalp)  Torso (front, back, and sides)  Arms (tops, undersides, upper, and lower armpits)  Hands (palms, backs, and fingers, including under the nails)  Buttocks and genitals  Legs (front, back, and sides)  Feet (tops, soles, toes, including under the nails, and between toes)  If you have a lot of moles, take digital photos of them each month. Make sure to take photos both up close and from a distance. These can help you see if any moles change over time.   Most skin changes are not cancer. But if you see any changes in your skin, call your doctor right away. Only he or she can diagnose a problem. If you have skin cancer, seeing your doctor can be the first step toward getting the treatment that could save your life.   [employer] last reviewed this educational content on 4/1/2019 2000-2020 [employer]. [address] [place] [address], [place] [address]. All rights reserved. This information is not intended as a substitute for professional medical care. Always follow your healthcare professional's instructions.        When should I call my doctor?  If you are worsening or not improving, please, contact us or seek urgent care as noted below.      Who should I call with questions (adults)?  [facility] (adult and pediatric): [phone]  [facility]  [place] (adult): [phone]  [facility] ([place], [place], [place] and [place]) [phone]  For urgent needs outside of business hours call the [facility] at [phone] and ask for the dermatology resident on call to be paged  If this is a medical emergency and you are unable to reach an ER, Call 911        If you need a prescription refill, please contact your pharmacy. Refills are approved or denied by our Physicians during normal business hours, Monday through Fridays  Per office policy, refills will not be granted if you have not been seen within the past year (or sooner depending on your child's condition)

## 2024-07-23 ENCOUNTER — VIRTUAL VISIT (OUTPATIENT)
Dept: SLEEP MEDICINE | Facility: CLINIC | Age: 54
End: 2024-07-23
Attending: NURSE PRACTITIONER
Payer: COMMERCIAL

## 2024-07-23 VITALS — WEIGHT: 148 LBS | BODY MASS INDEX: 23.23 KG/M2 | HEIGHT: 67 IN

## 2024-07-23 DIAGNOSIS — F51.4 NIGHT TERRORS: ICD-10-CM

## 2024-07-23 DIAGNOSIS — G47.9 SLEEP DISTURBANCE: Primary | ICD-10-CM

## 2024-07-23 DIAGNOSIS — I47.10 PSVT (PAROXYSMAL SUPRAVENTRICULAR TACHYCARDIA) (H): ICD-10-CM

## 2024-07-23 DIAGNOSIS — R06.81 WITNESSED APNEIC SPELLS: ICD-10-CM

## 2024-07-23 DIAGNOSIS — R06.83 SNORING: ICD-10-CM

## 2024-07-23 PROCEDURE — 99205 OFFICE O/P NEW HI 60 MIN: CPT | Mod: 95 | Performed by: NURSE PRACTITIONER

## 2024-07-23 ASSESSMENT — SLEEP AND FATIGUE QUESTIONNAIRES
HOW LIKELY ARE YOU TO NOD OFF OR FALL ASLEEP IN A CAR, WHILE STOPPED FOR A FEW MINUTES IN TRAFFIC: WOULD NEVER DOZE
HOW LIKELY ARE YOU TO NOD OFF OR FALL ASLEEP WHILE SITTING AND READING: MODERATE CHANCE OF DOZING
HOW LIKELY ARE YOU TO NOD OFF OR FALL ASLEEP WHEN YOU ARE A PASSENGER IN A CAR FOR AN HOUR WITHOUT A BREAK: SLIGHT CHANCE OF DOZING
HOW LIKELY ARE YOU TO NOD OFF OR FALL ASLEEP WHILE WATCHING TV: SLIGHT CHANCE OF DOZING
HOW LIKELY ARE YOU TO NOD OFF OR FALL ASLEEP WHILE SITTING QUIETLY AFTER LUNCH WITHOUT ALCOHOL: SLIGHT CHANCE OF DOZING
HOW LIKELY ARE YOU TO NOD OFF OR FALL ASLEEP WHILE SITTING AND TALKING TO SOMEONE: WOULD NEVER DOZE
HOW LIKELY ARE YOU TO NOD OFF OR FALL ASLEEP WHILE SITTING INACTIVE IN A PUBLIC PLACE: WOULD NEVER DOZE
HOW LIKELY ARE YOU TO NOD OFF OR FALL ASLEEP WHILE LYING DOWN TO REST IN THE AFTERNOON WHEN CIRCUMSTANCES PERMIT: MODERATE CHANCE OF DOZING

## 2024-07-23 ASSESSMENT — PAIN SCALES - GENERAL: PAINLEVEL: NO PAIN (0)

## 2024-07-23 NOTE — PROGRESS NOTES
Virtual Visit Details    Type of service:  Video Visit     Originating Location (pt. Location): Home    Distant Location (provider location):  Off-site  Platform used for Video Visit: [facility]      Outpatient Sleep Medicine Consultation:      Name: Wanda Orellana MRN# [id number]   Age: 54 year old YOB: 1970     Date of Consultation: July 23, 2024  Consultation is requested by: MARY JO Kinsey CNP  [address]  [place] [address] Carol Bahena  Primary care provider: Carol Bahena       Reason for Sleep Consult:     Wanda Orellana is sent by Carol Bahena for a sleep consultation regarding snoring, elevated BP, night terrors, possible KARMEN.    Patient s Reason for visit  Wanda Orellana main reason for visit: Night terrors and snoring - referral from primary, mostly due to my night terrors I think  Patient states problem(s) started: over 15?  Wanda Orellana's goals for this visit: to determine if there is anything I can do to help eliminate night terrors and see if my snoring is tied to me being tired           Assessment and Plan:     Summary Sleep Diagnoses:  1. Sleep disturbance  2. Snoring  3. Witnessed apneic spells  4. PSVT (paroxysmal supraventricular tachycardia) (H24)  5. Night terrors  - Adult Sleep Eval & Management  Referral  - Comprehensive Sleep Study; Future      Comorbid Diagnoses:  Seasonal allergies  Headaches  Mild intermittent asthma  GERD      Summary Recommendations:  Recommend further evaluation with diagnostic in-lab polysomnography (PSG) for possible sleep disordered breathing and night terrors in the setting of recent elevated BPs and paroxysmal SVT.  She has an intermediate to high pretest probability of KARMEN with symptoms of snoring, snorting self awake, witnessed apneas, difficulty falling/staying asleep, getting up to urinate more than once, and history of night terrors.  Her STOP-BANG score is 4 today which suggest a high risk of KARMEN using the  enhanced version of the questionnaire.  He has an ongoing history of what she calls night terrors that have been going on for several years and often occur sporadically and with some evolution over the years.  The underlying same regarding the night terrors now involve the feeling of being chased or someone is trying to get her off and involving yelling/screaming during her sleep.  We discussed the pathophysiology of obstructive sleep apnea and the risks associated with untreated KARMEN.  We also discussed the pros and cons of in lab polysomnography versus home sleep testing.  The patient has elected to undergo in lab polysomnography (PSG) to further evaluate her symptoms of possible obstructive sleep apnea and night terrors.  All potential therapeutic options including positive airway pressure, mandibular advancing oral appliances, positional therapy, and surgical options were discussed. Also counseled about impact of weight loss on KARMEN.   Follow-up in approximately 2 weeks after the sleep study to review the results and to determine next steps or the patient may elect to receive her sleep study results via [employer] message and consideration for treatment options may be determined at that time.    Orders Placed This Encounter   Procedures    Comprehensive Sleep Study         Summary Counseling:    Sleep Testing Reviewed  Obstructive Sleep Apnea Reviewed  Complications of Untreated Sleep Apnea Reviewed  Previous recent chart notes, lab, imaging, cardiology results reviewed    Medical Decision-making:   Educational materials provided in instructions    Total time spent reviewing medical records, history and physical examination, review of previous testing and interpretation as well as documentation on this date: 63 minutes    CC: Carol Bahena          History of Present Illness:     Wanda Orellana is a 54-year-old female with a PMH significant for seasonal allergies, headaches, mild intermittent asthma, GERD, and  history of paroxysmal supraventricular tachycardia presents today with symptoms of snoring, snorting self awake, witnessed apneas, difficulty falling/staying asleep, getting up to urinate more than once, and history of night terrors.  Her night terrors have been going on for several years - started as feeling naked outside, then has changed over time to being chased in dreams, yelling/screaming in sleep with this. 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 She was referred by her primary care provider for further evaluation of possible sleep disordered breathing in the setting of elevated BP without diagnosis of HTN.    Past Sleep Evaluations: No    SLEEP-WAKE SCHEDULE:     Work/School Days: Patient goes to school/work: Yes   Usually gets into bed at 10ish  Takes patient about an hour to fall asleep  Has trouble falling asleep 2 or 3 harder in the summer nights per week  Wakes up in the middle of the night 1 to 2 times.  Wakes up due to Snorting self awake;External stimuli (bed partner, pets, noise, etc);Use the bathroom;Nightmares  She has trouble falling back asleep 1 times a week.   It usually takes 15 min? to get back to sleep  Patient is usually up at 7:45  Uses alarm: Yes    Weekends/Non-work Days/All Other Days:  Usually gets into bed at 10   Takes patient about 1 giyr to fall asleep  Patient is usually up at 7:45  Uses alarm: Yes    Sleep Need  Patient gets  7 or 8 hours sleep on average   Patient thinks she needs about 8+ for sure sleep    Wanda Orellana prefers to sleep in this position(s): Side   Patient states they do the following activities in bed: Watch TV    Naps  Patient takes a purposeful nap 0 times a week and naps are usually if i take them maybe 1 hour or so in duration  She feels better after a nap: No  She dozes off unintentionally 0 days per week  Patient has had a driving accident or near-miss due to sleepiness/drowsiness:  No      SLEEP DISRUPTIONS:    Breathing/Snoring  Patient snores:Yes  Other people complain about her snoring: Yes  Patient has been told she stops breathing in her sleep:Yes  She has issues with the following: Getting up to urinate more than once    Movement:  Patient gets pain, discomfort, with an urge to move:  No  It happens when she is resting:  No  It happens more at night:  No  Patient has been told she kicks her legs at night:  No     Behaviors in Sleep:  Wanda Orellana has experienced the following behaviors while sleeping: Recurring Nightmares;Sleep-talking;Sleepwalking;Teeth grinding;Night terrors (screaming,yelling or acting afraid but not recalling event);See or hear things that are not really there upon awakening or just falling asleep  She has experienced sudden muscle weakness during the day: No      Is there anything else you would like your sleep provider to know: don't think so      CAFFEINE AND OTHER SUBSTANCES:    Patient consumes caffeinated beverages per day:  1, maybe 2  Last caffeine use is usually: typically morning coffee  List of any prescribed or over the counter stimulants that patient takes: 0  List of any prescribed or over the counter sleep medication patient takes: 0  List of previous sleep medications that patient has tried: 0  Patient drinks alcohol to help them sleep: No  Patient drinks alcohol near bedtime: No    Family History:  Patient has a family member been diagnosed with a sleep disorder: Yes  my mom used cpap         SCALES:    EPWORTH SLEEPINESS SCALE         7/23/2024     9:00 AM    [place] Sleepiness Scale ( JUAN JOSÉ Carrington  [id number]<br>ESS - [place]/English - Final version - 21 Nov 07 - [facility].)   Sitting and reading Moderate chance of dozing   Watching TV Slight chance of dozing   Sitting, inactive in a public place (e.g. a theatre or a meeting) Would never doze   As a passenger in a car for an hour without a break Slight chance of dozing   Lying down to rest  in the afternoon when circumstances permit Moderate chance of dozing   Sitting and talking to someone Would never doze   Sitting quietly after a lunch without alcohol Slight chance of dozing   In a car, while stopped for a few minutes in traffic Would never doze   [place] Score (MC) 7   [place] Score (Sleep) 7         INSOMNIA SEVERITY INDEX (JOANN)          7/23/2024     8:51 AM   Insomnia Severity Index (JOANN)   Difficulty falling asleep 1   Difficulty staying asleep 1   Problems waking up too early 0   How SATISFIED/DISSATISFIED are you with your CURRENT sleep pattern? 1   How NOTICEABLE to others do you think your sleep problem is in terms of impairing the quality of your life? 1   How WORRIED/DISTRESSED are you about your current sleep problem? 1   To what extent do you consider your sleep problem to INTERFERE with your daily functioning (e.g. daytime fatigue, mood, ability to function at work/daily chores, concentration, memory, mood, etc.) CURRENTLY? 1   JOANN Total Score 6       Guidelines for Scoring/Interpretation:  Total score categories:  0-7 = No clinically significant insomnia   8-14 = Subthreshold insomnia   15-21 = Clinical insomnia (moderate severity)  22-28 = Clinical insomnia (severe)  Used via courtesy of www.PrePayMeth.va.gov with permission from Tyrell Aldana PhD., [facility]      STOP BANG score: 4        7/23/2024     1:01 PM   STOP BANG Questionnaire (  2008, the American Society of Anesthesiologists, Inc. [employer].)   BMI Clinic: 23.18         GAD7        7/12/2023     8:53 AM   SOTO-7    1. Feeling nervous, anxious, or on edge 1   2. Not being able to stop or control worrying 1   3. Worrying too much about different things 1   4. Trouble relaxing 1   5. Being so restless that it is hard to sit still 0   6. Becoming easily annoyed or irritable 1   7. Feeling afraid, as if something awful might happen 1   SOTO-7 Total Score 6   If you checked any problems, how  "difficult have they made it for you to do your work, take care of things at home, or get along with other people? Not difficult at all         CAGE-AID        7/12/2023     9:01 AM   CAGE-AID Flowsheet   Have you ever felt you should Cut down on your drinking or drug use? 0   Have people Annoyed you by criticizing your drinking or drug use? 0   Have you ever felt bad or Guilty about your drinking or drug use? 0   Have you ever had a drink or used drugs first thing in the morning to steady your nerves or to get rid of a hangover? (Eye opener) 0   CAGE-AID SCORE 0   Have you ever felt you should Cut down on your drinking or drug use? No   Have people Annoyed you by criticizing your drinking or drug use? No   Have you ever felt bad or Guilty about your drinking or drug use? No   Have you ever had a drink or used drugs first thing in the morning to steady your nerves or to get rid of a hangover? (Eye opener) No   CAGE-AID SCORE 0 (A total score of 2 or greater is considered clinically significant)       CAGE-AID reprinted with permission from the [place] Medical Journal, SAVAGE Lou. and PERCY Zamarripa, \"Conjoint screening questionnaires for alcohol and drug abuse\" [place] Medical Journal 94: 135-140, 1995.      PATIENT HEALTH QUESTIONNAIRE-9 (PHQ - 9)        7/12/2023     8:52 AM   PHQ-9 ([employer])   1.  Little interest or pleasure in doing things 0   2.  Feeling down, depressed, or hopeless 0   3.  Trouble falling or staying asleep, or sleeping too much 1   4.  Feeling tired or having little energy 1   5.  Poor appetite or overeating 0   6.  Feeling bad about yourself - or that you are a failure or have let yourself or your family down 0   7.  Trouble concentrating on things, such as reading the newspaper or watching television 0   8.  Moving or speaking so slowly that other people could have noticed. Or the opposite - being so fidgety or restless that you have been moving around a lot more than usual 0   9.  Thoughts " that you would be better off dead, or of hurting yourself in some way 0   PHQ-9 Total Score 2   6.  Feeling bad about yourself 0   7.  Trouble concentrating 0   8.  Moving slowly or restless 0   9.  Suicidal or self-harm thoughts 0   1.  Little interest or pleasure in doing things Not at all   2.  Feeling down, depressed, or hopeless Not at all   3.  Trouble falling or staying asleep, or sleeping too much Several days   4.  Feeling tired or having little energy Several days   5.  Poor appetite or overeating Not at all   6.  Feeling bad about yourself Not at all   7.  Trouble concentrating Not at all   8.  Moving slowly or restless Not at all   9.  Suicidal or self-harm thoughts Not at all   PHQ-9 via [employer] TOTAL SCORE-----> 2 (Minimal depression)   Difficulty at work, home, or with people Somewhat difficult       Developed by Antonia Schmitz, Sarah Ahumada, Lupillo Abdul and colleagues, with an educational belinda from [employer] No permission required to reproduce, translate, display or distribute.        Allergies:    Allergies   Allergen Reactions    Asa [Aspirin]     Aspirin Buffered Other (See Comments)     Mouth and tongue swelling    Codeine        Medications:    Current Outpatient Medications   Medication Sig Dispense Refill    albuterol (PROAIR HFA/PROVENTIL HFA/VENTOLIN HFA) 108 (90 Base) MCG/ACT inhaler Inhale 2 puffs into the lungs every 6 hours as needed for shortness of breath / dyspnea or wheezing 18 g 11    budesonide-formoterol (SYMBICORT) 80-4.5 MCG/ACT Inhaler Inhale 2 puffs into the lungs 2 times daily (Patient not taking: Reported on 3/28/2024) 10.2 g 11    Cetirizine HCl (ZYRTEC ALLERGY PO) Take 1 tablet by mouth daily      fluticasone (FLOVENT HFA) 220 MCG/ACT inhaler Inhale 2 puffs into the lungs 2 times daily (Patient not taking: Reported on 6/28/2024) 36 g 3    metoprolol succinate ER (TOPROL XL) 25 MG 24 hr tablet Take 1 tablet (25 mg) by mouth daily 90 tablet 3     multivitamin, therapeutic with minerals (THERA-VIT-M) TABS Take 1 tablet by mouth daily      norethindrone-ethinyl estradiol (ALAYCEN 1/35) 1-35 MG-MCG tablet Take 1 tablet by mouth daily 84 tablet 4    vitamin E 400 UNIT TABS Take 400 Units by mouth daily.         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      Mild intermittent asthma      Priority: Medium     exercise induced      CARDIOVASCULAR SCREENING; LDL GOAL LESS THAN 160 02/29/2012     Priority: Medium    AMA (advanced maternal age) primigravida 35+ 02/02/2012     Priority: Medium     Normal 1st trimester screen.  H/o trisomy 21 previous SAB.  Level II fetal survey with left EIF, thickened cardiac wall, and right pyelectasis.  S/p genetic amnio 04/26/13.  Results pending.    Normal chromosomes, normal AFP.        Mucous polyp of cervix 02/02/2012     Priority: Medium    GERD (gastroesophageal reflux disease) 10/07/2011     Priority: Medium        Past Medical/Surgical History:  Past Medical History:   Diagnosis Date    Fracture of metatarsal bone(s), closed 11/09    foreign body in foot ?needle?    Headaches     Mild intermittent asthma 2011    exercise induced    Seasonal allergies      Past Surgical History:   Procedure Laterality Date    COLONOSCOPY N/A 7/13/2022    Procedure: COLONOSCOPY, WITH POLYPECTOMY;  Surgeon: Alvino Chamberlain MD;  [place] [facility] [place]    DILATION AND CURETTAGE SUCTION  2/24/2012    Procedure:DILATION AND CURETTAGE SUCTION; Suction Dilation & Curettage   (11 weeks); Surgeon:CHESTER ALVAREZ; Location:[facility] OR    EYE SURGERY  2002    laser surg w/ complications of infections, etc    WISDOM TEETH[         Social History:  Social History     Socioeconomic History    Marital  status: Single     Spouse name: Not on file    Number of children: Not on file    Years of education: Not on file    Highest education level: Not on file   Occupational History    Not on file   Tobacco Use    Smoking status: Never    Smokeless tobacco: Never   Vaping Use    Vaping status: Never Used   Substance and Sexual Activity    Alcohol use: Yes     Comment: OCCASSIONALLY- 2 PER MONTH    Drug use: No    Sexual activity: Yes     Partners: Male     Birth control/protection: Pill   Other Topics Concern    Parent/sibling w/ CABG, MI or angioplasty before 65F 55M? Not Asked     Service No    Blood Transfusions No    Caffeine Concern No    Occupational Exposure Not Asked    Hobby Hazards Not Asked    Sleep Concern No    Stress Concern Yes     Comment: care for mom    Weight Concern No    Special Diet No    Back Care No    Exercise Yes     Comment: cardio, wts,     Bike Helmet Yes    Seat Belt No    Self-Exams Yes   Social History Narrative    Caffeine intake/servings daily - 0    Calcium intake/servings daily - 3    Exercise 3+ times weekly - describe biking, cardio    Sunscreen used - Yes    Seatbelts used - Yes    Guns stored in the home - No    Self Breast Exam - No    Pap test up to date -  Yes    Eye exam up to date -  No    Dental exam up to date -  Yes    DEXA scan up to date -  Not Applicable    Flex Sig/Colonoscopy up to date -  Not Applicable    Mammography up to date -  No    Immunizations reviewed and up to date - Yes    Abuse: Current or Past (Physical, Sexual or Emotional) - No    Do you feel safe in your environment - Yes    Do you cope well with stress - Yes    Do you suffer from insomnia - No    Last updated by: ROBIN BLANCO  1/23/2013                                         Social Determinants of Health     Financial Resource Strain: Low Risk  (6/28/2024)    Financial Resource Strain     Within the past 12 months, have you or your family members you live with been unable to get  utilities (heat, electricity) when it was really needed?: No   Food Insecurity: Low Risk  (6/28/2024)    Food Insecurity     Within the past 12 months, did you worry that your food would run out before you got money to buy more?: No     Within the past 12 months, did the food you bought just not last and you didn t have money to get more?: No   Transportation Needs: Low Risk  (6/28/2024)    Transportation Needs     Within the past 12 months, has lack of transportation kept you from medical appointments, getting your medicines, non-medical meetings or appointments, work, or from getting things that you need?: No   Physical Activity: Unknown (6/28/2024)    Exercise Vital Sign     Days of Exercise per Week: 3 days     Minutes of Exercise per Session: Not on file   Stress: Stress Concern Present (6/28/2024)    [place] [employer] - Occupational Stress Questionnaire     Feeling of Stress : To some extent   Social Connections: Unknown (6/28/2024)    Social Connection and Isolation Panel [NHANES]     Frequency of Communication with Friends and Family: Not on file     Frequency of Social Gatherings with Friends and Family: Once a week     Attends [employer] Services: Not on file     Active Member of Clubs or Organizations: Not on file     Attends Club or Organization Meetings: Not on file     Marital Status: Not on file   Interpersonal Safety: Low Risk  (6/28/2024)    Interpersonal Safety     Do you feel physically and emotionally safe where you currently live?: Yes     Within the past 12 months, have you been hit, slapped, kicked or otherwise physically hurt by someone?: No     Within the past 12 months, have you been humiliated or emotionally abused in other ways by your partner or ex-partner?: No   Housing Stability: Low Risk  (6/28/2024)    Housing Stability     Do you have housing? : Yes     Are you worried about losing your housing?: No       Family History:  Family History   Problem Relation Age  "of Onset    Diabetes Mother     Arthritis Mother     Heart Failure Mother     Lipids Mother     Obesity Mother     Eye Disorder Mother         retinal myopathy    Hypertension Mother     Kidney Disease Mother     Asthma Mother     Respiratory Mother         emphysema-smoker    Unknown/Adopted Father     Diabetes Maternal Grandmother     Arthritis Maternal Grandmother     Unknown/Adopted Paternal Grandmother     Unknown/Adopted Paternal Grandfather     Asthma Son     Alcohol/Drug Maternal Aunt        Review of Systems:  A complete review of systems reviewed by me is negative with the exeption of what has been mentioned in the history of present illness.  In the last TWO WEEKS have you experienced any of the following symptoms?  Fevers: No  Night Sweats: Yes  Weight Gain: No  Pain at Night: No  Double Vision: No  Changes in Vision: No  Difficulty Breathing through Nose: No  Sore Throat in Morning: No  Dry Mouth in the Morning: No  Shortness of Breath Lying Flat: No  Shortness of Breath With Activity: Yes  Awakening with Shortness of Breath: No  Increased Cough: No  Heart Racing at Night: Yes  Swelling in Feet or Legs: No  Diarrhea at Night: No  Heartburn at Night: No  Urinating More than Once at Night: No  Losing Control of Urine at Night: No  Joint Pains at Night: No  Headaches in Morning: No  Weakness in Arms or Legs: No  Depressed Mood: No  Anxiety: Yes     Physical Examination:  Vitals: Ht 1.702 m (5' 7\")   Wt 67.1 kg (148 lb)   LMP 06/07/2024 (Within Days)   BMI 23.18 kg/m    BMI= Body mass index is 23.18 kg/m .           GENERAL APPEARANCE: healthy, alert, no distress, and cooperative  EYES: Eyes grossly normal to inspection  RESP: Unlabored, easy breathing with normal conversational speech  CV: color normal  NEURO: Alert and oriented x 3, normal strength and tone, mentation intact, and speech normal  PSYCH: mentation appears normal and affect normal/bright  Mallampati Class: Unable to examine  Tonsillar " "Stage: Unable to examine         Data: All pertinent previous laboratory data reviewed     Recent Labs   Lab Test 06/27/23  0953 04/25/22  1023    137   POTASSIUM 4.4 4.0   CHLORIDE 104 106   CO2 24 24   ANIONGAP 11 7   GLC 93 83   BUN 8.2 9   CR 0.85 0.75   CORDELL 9.6 9.3       Recent Labs   Lab Test 09/30/20  0853   WBC 5.2   RBC 4.59   HGB 14.0   HCT 42.8   MCV 93   MCH 30.5   MCHC 32.7   RDW 11.9          Recent Labs   Lab Test 04/25/22  1023   PROTTOTAL 8.2   ALBUMIN 3.9   BILITOTAL 0.5   ALKPHOS 48   AST 18   ALT 23       TSH   Date Value   06/27/2023 3.19 uIU/mL   04/25/2022 2.60 mU/L   09/30/2020 2.46 mU/L   05/18/2018 2.31 mU/L       No results found for: \"UAMP\", \"UBARB\", \"BENZODIAZEUR\", \"UCANN\", \"UCOC\", \"OPIT\", \"UPCP\"    Ferritin   Date/Time Value Ref Range Status   09/30/2020 08:53  8 - 252 ng/mL Final       No results found for: \"PH\", \"PHARTERIAL\", \"PO2\", \"OE3DXIUGYLJ\", \"SAT\", \"PCO2\", \"HCO3\", \"BASEEXCESS\", \"YEFRI\", \"BEB\"    @LABRCNTIPR(phv:4,pco2v:4,po2v:4,hco3v:4,maria d:4,o2per:4)@    Echocardiology: No results found for this or any previous visit (from the past 4320 hour(s)).  2/12/2024 Echocardiogram Complete  Interpretation Summary  Global and regional left ventricular function is normal with an EF of 60-65%.  The right ventricle is normal size. Global right ventricular function is normal.  No significant valvular abnormalities present.  IVC diameter <2.1 cm collapsing >50% with sniff suggests a normal RA pressure of 3 mmHg.  There is no prior study for direct comparison.      Chest x-ray:   No results found for this or any previous visit from the past 365 days.      Chest CT:   No results found for this or any previous visit from the past 365 days.      PFT: Most Recent [facility] Pulmonary Function Testing    No results found for: \"20001\"  No results found for: \"20002\"  No results found for: \"20003\"  No results found for: \"20015\"  No results found for: \"20016\"  No results found for: " "\"20027\"  No results found for: \"20028\"  No results found for: \"20029\"  No results found for: \"20079\"  No results found for: \"20080\"  No results found for: \"20081\"  No results found for: \"20335\"  No results found for: \"20105\"  No results found for: \"20053\"  No results found for: \"20054\"  No results found for: \"20055\"      MARY JO Grady CNP 7/23/2024   Sleep Medicine      This note was written with the assistance of the Dragon voice-dictation technology software. The final document, although reviewed, may contain errors. For corrections, please contact the office.          "

## 2024-07-23 NOTE — PATIENT INSTRUCTIONS
"          MY TREATMENT INFORMATION FOR SLEEP APNEA-  Wanda Orellana    DOCTOR : MARY JO Grady CNP    Am I having a sleep study at a sleep center?  --->Due to normal delays, you will be contacted within 2-4 weeks to schedule    Am I having a home sleep study?  --->Watch the video for the device you are using:    -/drop off device-   https://www.[employer]ion Health[employer].com/watch?v=yGGFBdELGhk    -Disposable device sent out require phone/computer application-   https://www.[employer].com/watch?v=BCce_vbiwxE      Frequently asked questions:  1. What is Obstructive Sleep Apnea (KARMEN)? KARMEN is the most common type of sleep apnea. Apnea means, \"without breath.\"  Apnea is most often caused by narrowing or collapse of the upper airway as muscles relax during sleep.   Almost everyone has occasional apneas. Most people with sleep apnea have had brief interruptions at night frequently for many years.  The severity of sleep apnea is related to how frequent and severe the events are.   2. What are the consequences of KARMEN? Symptoms include: feeling sleepy during the day, snoring loudly, gasping or stopping of breathing, trouble sleeping, and occasionally morning headaches or heartburn at night.  Sleepiness can be serious and even increase the risk of falling asleep while driving. Other health consequences may include development of high blood pressure and other cardiovascular disease in persons who are susceptible. Untreated KARMEN  can contribute to heart disease, stroke and diabetes.   3. What are the treatment options? In most situations, sleep apnea is a lifelong disease that must be managed with daily therapy. Medications are not effective for sleep apnea and surgery is generally not considered until other therapies have been tried. Your treatment is your choice . Continuous Positive Airway (CPAP) works right away and is the therapy that is effective in nearly everyone. An oral device to hold your jaw forward is usually the next most " reliable option. Other options include postioning devices (to keep you off your back), weight loss, and surgery including a tongue pacing device. There is more detail about some of these options below.  4. Are my sleep studies covered by insurance? Although we will request verification of coverage, we advise you also check in advance of the study to ensure there is coverage.    Important tips for those choosing CPAP and similar devices  REMEMBER-IF YOU RECEIVE A CALL FROM  [phone]-->IT IS TO SETUP A DEVICE  For new devices, sign up for device LEIGH to monitor your device for your followup visits  We encourage you to utilize the [employer] leigh or website ( [email or web address].Deskarma/ ) to monitor your therapy progress and share the data with your healthcare team when you discuss your sleep apnea.                                                    Know your equipment:  CPAP is continuous positive airway pressure that prevents obstructive sleep apnea by keeping the throat from collapsing while you are sleeping. In most cases, the device is  smart  and can slowly self-adjusts if your throat collapses and keeps a record every day of how well you are treated-this information is available to you and your care team.  BPAP is bilevel positive airway pressure that keeps your throat open and also assists each breath with a pressure boost to maintain adequate breathing.  Special kinds of BPAP are used in patients who have inadequate breathing from lung or heart disease. In most cases, the device is  smart  and can slowly self-adjusts to assist breathing. Like CPAP, the device keeps a record of how well you are treated.  Your mask is your connection to the device. You get to choose what feels most comfortable and the staff will help to make sure if fits. Here: are some examples of the different masks that are available: Magnetic mask aids may assist with use but there are safety issues that should be addressed when  considering with magnets* ( see end of discussion).       Key points to remember on your journey with sleep apnea:  Sleep study.  PAP devices often need to be adjusted during a sleep study to show that they are effective and adjusted right.  Good tips to remember: Try wearing just the mask during a quiet time during the day so your body adapts to wearing it. A humidifier is recommended for comfort in most cases to prevent drying of your nose and throat. Allergy medication from your provider may help you if you are having nasal congestion.  Getting settled-in. It takes more than one night for most of us to get used to wearing a mask. Try wearing just the mask during a quiet time during the day so your body adapts to wearing it. A humidifier is recommended for comfort in most cases. Our team will work with you carefully on the first day and will be in contact within 4 days and again at 2 and 4 weeks for advice and remote device adjustments. Your therapy is evaluated by the device each day.   Use it every night. The more you are able to sleep naturally for 7-8 hours, the more likely you will have good sleep and to prevent health risks or symptoms from sleep apnea. Even if you use it 4 hours it helps. Occasionally all of us are unable to use a medical therapy, in sleep apnea, it is not dangerous to miss one night.   Communicate. Call our skilled team on the number provided on the first day if your visit for problems that make it difficult to wear the device. Over 2 out of 3 patients can learn to wear the device long-term with help from our team. Remember to call our team or your sleep providers if you are unable to wear the device as we may have other solutions for those who cannot adapt to mask CPAP therapy. It is recommended that you sleep your sleep provider within the first 3 months and yearly after that if you are not having problems.   Use it for your health. We encourage use of CPAP masks during daytime quiet  periods to allow your face and brain to adapt to the sensation of CPAP so that it will be a more natural sensation to awaken to at night or during naps. This can be very useful during the first few weeks or months of adapting to CPAP though it does not help medically to wear CPAP during wakefulness and  should not be used as a strategy just to meet guidelines.  Take care of your equipment. Make sure you clean your mask and tubing using directions every day and that your filter and mask are replaced as recommended or if they are not working.     *Masks with magnets:  Updated Contraindications  Masks with magnetic components are contraindicated for use by patients where they, or anyone in close physical contact while using the mask, have the following:   Active medical implants that interact with magnets (i.e., pacemakers, implantable cardioverter defibrillators (ICD), neurostimulators, cerebrospinal fluid (CSF) shunts, insulin/infusion pumps)   Metallic implants/objects containing ferromagnetic material (i.e., aneurysm clips/flow disruption devices, embolic coils, stents, valves, electrodes, implants to restore hearing or balance with implanted magnets, ocular implants, metallic splinters in the eye)  Updated Warning  Keep the mask magnets at a safe distance of at least 6 inches (150 mm) away from implants or medical devices that may be adversely affected by magnetic interference. This warning applies to you or anyone in close physical contact with your mask. The magnets are in the frame and lower headgear clips, with a magnetic field strength of up to 400mT. When worn, they connect to secure the mask but may inadvertently detach while asleep.  Implants/medical devices, including those listed within contraindications, may be adversely affected if they change function under external magnetic fields or contain ferromagnetic materials that attract/repel to magnetic fields (some metallic implants, e.g., contact lenses  with metal, dental implants, metallic cranial plates, screws, glenn hole covers, and bone substitute devices). Consult your physician and  of your implant / other medical device for information on the potential adverse effects of magnetic fields.    BESIDES CPAP, WHAT OTHER THERAPIES ARE THERE?    Positioning Device  Positioning devices are generally used when sleep apnea is mild and only occurs on your back.This example shows a pillow that straps around the waist. It may be appropriate for those whose sleep study shows milder sleep apnea that occurs primarily when lying flat on one's back. Preliminary studies have shown benefit but effectiveness at home may need to be verified by a home sleep test. These devices are generally not covered by medical insurance.  Examples of devices that maintain sleeping on the back to prevent snoring and mild sleep apnea.    Belt type body positioner  http://L2C/    Electronic reminder  http://nightshifttherapy.[employer]/            Oral Appliance  What is oral appliance therapy?  An oral appliance device fits on your teeth at night like a retainer used after having braces. The device is made by a specialized dentist and requires several visits over 1-2 months before a manufactured device is made to fit your teeth and is adjusted to prevent your sleep apnea. Once an oral device is working properly, snoring should be improved. A home sleep test may be recommended at that time if to determine whether the sleep apnea is adequately treated.       Some things to remember:  -Oral devices are often, but not always, covered by your medical insurance. Be sure to check with your insurance provider.   -If you are referred for oral therapy, you will be given a list of specialized dentists to consider or you may choose to visit the Web site of the American Academy of Dental Sleep Medicine  -Oral devices are less likely to work if you have severe sleep apnea or are extremely  overweight.     More detailed information  An oral appliance is a small acrylic device that fits over the upper and lower teeth  (similar to a retainer or a mouth guard). This device slightly moves jaw forward, which moves the base of the tongue forward, opens the airway, improves breathing for effective treat snoring and obstructive sleep apnea in perhaps 7 out of 10 people .  The best working devices are custom-made by a dental device  after a mold is made of the teeth 1, 2, 3.  When is an oral appliance indicated?  Oral appliance therapy is recommended as a first-line treatment for patients with primary snoring, mild sleep apnea, and for patients with moderate sleep apnea who prefer appliance therapy to use of CPAP4, 5. Severity of sleep apnea is determined by sleep testing and is based on the number of respiratory events per hour of sleep.   How successful is oral appliance therapy?  The success rate of oral appliance therapy in patients with mild sleep apnea is 75-80% while in patients with moderate sleep apnea it is 50-70%. The chance of success in patients with severe sleep apnea is 40-50%. The research also shows that oral appliances have a beneficial effect on the cardiovascular health of KARMEN patients at the same magnitude as CPAP therapy7.  Oral appliances should be a second-line treatment in cases of severe sleep apnea, but if not completely successful then a combination therapy utilizing CPAP plus oral appliance therapy may be effective. Oral appliances tend to be effective in a broad range of patients although studies show that the patients who have the highest success are females, younger patients, those with milder disease, and less severe obesity. 3, 6.   Finding a dentist that practices dental sleep medicine  Specific training is available through the American Academy of Dental Sleep Medicine for dentists interested in working in the field of sleep. To find a dentist who is educated in  the field of sleep and the use of oral appliances, near you, visit the Web site of the American Academy of Dental Sleep Medicine.    References  1. Aramis, et al. Objectively measured vs self-reported compliance during oral appliance therapy for sleep-disordered breathing. Chest 2013; 144(5): 1934-6820.  2. Mary Alice et al. Objective measurement of compliance during oral appliance therapy for sleep-disordered breathing. Thorax 2013; 68(1): 91-96.  3. Moiz et al. Mandibular advancement devices in 620 men and women with KARMEN and snoring: tolerability and predictors of treatment success. Chest 2004; 125: 1299-1732.  4. Parth, et al. Oral appliances for snoring and KARMEN: a review. Sleep 2006; 29: 244-262.  5. Darleen et al. Oral appliance treatment for KARMEN: an update. J Clin Sleep Med 2014; 10(2): 215-227.  6. Carmen et al. Predictors of OSAH treatment outcome. J Dent Res 2007; 86: 0532-5640.      Weight Loss:   Your Body mass index is 23.18 kg/m .    Being overweight does not necessarily mean you will have health consequences.  Those who have BMI over 35 or over 27 with existing medical conditions carries greater risk.   Weight loss decreases severity of sleep apnea in most people with obesity. For those with mild obesity who have developed snoring with weight gain, even 15-30 pound weight loss can improve and occasionally milder eliminate sleep apnea.  Structured and life-long dietary and health habits are necessary to lose weight and keep healthier weight levels.     The Comprehensive Weight loss program offers all aspects of weight loss strategies including two Non-Surgical Weight Loss Programs: Medical Weight Management and our 24 Week Healthy Lifestyle Program:    Medical Weight Management: You will meet with a Medical Weight Management Provider, as well as a Registered Dietician. The program may include medication therapy, dietary education, recommended exercise and physical therapy programs,  monthly support group meetings, and possible psychological counseling. Follow up visits with the provider or dietician are scheduled based on your progress and needs.    24 Week Healthy Lifestyle Program: This unique program is designed to give you the support of weekly appointments and activities thru a 24-week period. It may include all of the components of the basic program (above), with the addition of 11 individual Health  Visits, 24-week access to the [facility] website for over 700 online classes, and monthly support group meetings. This program has an out-of-pocket expense of $499 to cover the items that can not be billed to insurance (health coaches and [facility] access), and is non-refundable/non-transferable (you may be able to use a Health Savings Account; ask your HSA provider). There may be an optional meal replacement plan prescribed as well.   Surgical management achieves meaningful long-term weight loss and improvement in health risks in most patients with more severe obesity.      Sleep Apnea Surgery:    Surgery for obstructive sleep apnea is considered generally only when other therapies fail to work. Surgery may be discussed with you if you are having a difficult time tolerating CPAP and or when there is an abnormal structure that requires surgical correction.  Nose and throat surgeries often enlarge the airway to prevent collapse.  Most of these surgeries create pain for 1-2 weeks and up to half of the most common surgeries are not effective throughout life.  You should carefully discuss the benefits and drawbacks to surgery with your sleep provider and surgeon to determine if it is the best solution for you.   More information  Surgery for KARMEN is directed at areas that are responsible for narrowing or complete obstruction of the airway during sleep.  There are a wide range of procedures available to enlarge and/or stabilize the airway to prevent blockage of breathing in the three major  areas where it can occur: the palate, tongue, and nasal regions.  Successful surgical treatment depends on the accurate identification of the factors responsible for obstructive sleep apnea in each person.  A personalized approach is required because there is no single treatment that works well for everyone.  Because of anatomic variation, consultation with an examination by a sleep surgeon is a critical first step in determining what surgical options are best for each patient.  In some cases, examination during sedation may be recommended in order to guide the selection of procedures.  Patients will be counseled about risks and benefits as well as the typical recovery course after surgery. Surgery is typically not a cure for a person s KARMEN.  However, surgery will often significantly improve one s KARMEN severity (termed  success rate ).  Even in the absence of a cure, surgery will decrease the cardiovascular risk associated with OSA7; improve overall quality of life8 (sleepiness, functionality, sleep quality, etc).      Palate Procedures:  Patients with KARMEN often have narrowing of their airway in the region of their tonsils and uvula.  The goals of palate procedures are to widen the airway in this region as well as to help the tissues resist collapse.  Modern palate procedure techniques focus on tissue conservation and soft tissue rearrangement, rather than tissue removal.  Often the uvula is preserved in this procedure. Residual sleep apnea is common in patient after pharyngoplasty with an average reduction in sleep apnea events of 33%2.      Tongue Procedures:  ExamWhile patients are awake, the muscles that surround the throat are active and keep this region open for breathing. These muscles relax during sleep, allowing the tongue and other structures to collapse and block breathing.  There are several different tongue procedures available.  Selection of a tongue base procedure depends on characteristics seen on  physical exam.  Generally, procedures are aimed at removing bulky tissues in this area or preventing the back of the tongue from falling back during sleep.  Success rates for tongue surgery range from 50-62%3.    Hypoglossal Nerve Stimulation:  Hypoglossal nerve stimulation has recently received approval from the [place] Food and Drug Administration for the treatment of obstructive sleep apnea.  This is based on research showing that the system was safe and effective in treating sleep apnea6.  Results showed that the median AHI score decreased 68%, from 29.3 to 9.0. This therapy uses an implant system that senses breathing patterns and delivers mild stimulation to airway muscles, which keeps the airway open during sleep.  The system consists of three fully implanted components: a small generator (similar in size to a pacemaker), a breathing sensor, and a stimulation lead.  Using a small handheld remote, a patient turns the therapy on before bed and off upon awakening.    Candidates for this device must be greater than 18 years of age, have moderate to severe obstructive sleep apnea with less than 25% central events  (AHI between 15-65), BMI less than 35, have tried CPAP/oral appliance for at least 8 weeks without success, and have appropriate upper airway anatomy (determined by a sleep endoscopy performed by Dr. Ezekiel Huff or Dr. Kelvin Gerber).    Nasal Procedures:  Nasal obstruction can interfere with nasal breathing during the day and night.  Studies have shown that relief of nasal obstruction can improve the ability of some patients to tolerate positive airway pressure therapy for obstructive sleep apnea1.  Treatment options include medications such as nasal saline, topical corticosteroid and antihistamine sprays, and oral medications such as antihistamines or decongestants. Non-surgical treatments can include external nasal dilators for selected patients. If these are not successful by themselves,  surgery can improve the nasal airway either alone or in combination with these other options.        Combination Procedures:  Combination of surgical procedures and other treatments may be recommended, particularly if patients have more than one area of narrowing or persistent positional disease.  The success rate of combination surgery ranges from 66-80%2,3.    References  Deb MCCANN. The Role of the Nose in Snoring and Obstructive Sleep Apnoea: An Update.  Eur Arch Otorhinolaryngol. 2011; 268: 1365-73.   Nicky SM; Catarino JA; Lisandro JR; Pallanch JF; Melissa MB; Xochilt SG; Rabia KEE. Surgical modifications of the upper airway for obstructive sleep apnea in adults: a systematic review and meta-analysis. SLEEP 2010;33(10):9636-2309. Claire DERAS. Hypopharyngeal surgery in obstructive sleep apnea: an evidence-based medicine review.  Arch Otolaryngol Head Neck Surg. 2006 Feb;132(2):206-13.  Pankaj YH1, Jose Y, Aditya JESSIE. The efficacy of anatomically based multilevel surgery for obstructive sleep apnea. Otolaryngol Head Neck Surg. 2003 Oct;129(4):327-35.  Claire DERAS, Goldberg A. Hypopharyngeal Surgery in Obstructive Sleep Apnea: An Evidence-Based Medicine Review. Arch Otolaryngol Head Neck Surg. 2006 Feb;132(2):206-13.  Herman ALICEA et al. Upper-Airway Stimulation for Obstructive Sleep Apnea.  N Engl J Med. 2014 Jan 9;370(2):139-49.  Mushtaq Y et al. Increased Incidence of Cardiovascular Disease in Middle-aged Men with Obstructive Sleep Apnea. Am J Respir Crit Care Med; 2002 166: 159-165  Sen EM et al. Studying Life Effects and Effectiveness of Palatopharyngoplasty (SLEEP) study: Subjective Outcomes of Isolated Uvulopalatopharyngoplasty. Otolaryngol Head Neck Surg. 2011; 144: 623-631.        WHAT IF I ONLY HAVE SNORING?    Mandibular advancement devices, lateral sleep positioning, long-term weight loss and treatment of nasal allergies have been shown to improve snoring.  Exercising tongue muscles with a game  (https://[employer]Touch Technology.Engage Mobility.[employer] Marketers/us/leigh/soundly-reduce-snoring/ce8663865501) or stimulating the tongue during the day with a device (https://doi.org/10.3390/mdd47444696) have improved snoring in some individuals.  https://www.[employer]/  https://www.sleepfoundation.org/best-anti-snoring-mouthpieces-and-mouthguards    Remember to Drive Safe... Drive Alive     Sleep health profoundly affects your health, mood, and your safety.  Thirty three percent of the population (one in three of us) is not getting enough sleep and many have a sleep disorder. Not getting enough sleep or having an untreated / undertreated sleep condition may make us sleepy without even knowing it. In fact, our driving could be dramatically impaired due to our sleep health. As your provider, here are some things I would like you to know about driving:     Here are some warning signs for impairment and dangerous drowsy driving:              -Having been awake more than 16 hours               -Looking tired               -Eyelid drooping              -Head nodding (it could be too late at this point)              -Driving for more than 30 minutes     Some things you could do to make the driving safer if you are experiencing some drowsiness:              -Stop driving and rest              -Call for transportation              -Make sure your sleep disorder is adequately treated     Some things that have been shown NOT to work when experiencing drowsiness while driving:              -Turning on the radio              -Opening windows              -Eating any  distracting  /  entertaining  foods (e.g., sunflower seeds, candy, or any other)              -Talking on the phone      Your decision may not only impact your life, but also the life of others. Please, remember to drive safe for yourself and all of us.

## 2024-07-23 NOTE — NURSING NOTE
Current patient location: [address] [facility] [address]    Is the patient currently in the state of [place]? YES    Visit mode:VIDEO    If the visit is dropped, the patient can be reconnected by: VIDEO VISIT: Text to cell phone:   Telephone Information:   Mobile [phone]       Will anyone else be joining the visit? NO  (If patient encounters technical issues they should call [phone]    How would you like to obtain your AVS? MyChart    Are changes needed to the allergy or medication list? No    Are refills needed on medications prescribed by this physician? NO    Reason for visit: Consult    Anthony LANDERS

## 2024-09-03 ENCOUNTER — MYC REFILL (OUTPATIENT)
Dept: FAMILY MEDICINE | Facility: CLINIC | Age: 54
End: 2024-09-03
Payer: COMMERCIAL

## 2024-09-03 DIAGNOSIS — Z30.41 ENCOUNTER FOR SURVEILLANCE OF CONTRACEPTIVE PILLS: ICD-10-CM

## 2024-09-03 RX ORDER — NORETHINDRONE AND ETHINYL ESTRADIOL 1 MG-35MCG
1 KIT ORAL DAILY
Qty: 84 TABLET | Refills: 4 | Status: SHIPPED | OUTPATIENT
Start: 2024-09-03

## 2024-09-26 ENCOUNTER — PATIENT OUTREACH (OUTPATIENT)
Dept: CARE COORDINATION | Facility: CLINIC | Age: 54
End: 2024-09-26
Payer: COMMERCIAL

## 2024-10-23 ENCOUNTER — THERAPY VISIT (OUTPATIENT)
Dept: SLEEP MEDICINE | Facility: CLINIC | Age: 54
End: 2024-10-23
Attending: NURSE PRACTITIONER
Payer: COMMERCIAL

## 2024-10-23 DIAGNOSIS — I47.10 PSVT (PAROXYSMAL SUPRAVENTRICULAR TACHYCARDIA) (H): ICD-10-CM

## 2024-10-23 DIAGNOSIS — R06.83 SNORING: ICD-10-CM

## 2024-10-23 DIAGNOSIS — F51.4 NIGHT TERRORS: ICD-10-CM

## 2024-10-23 DIAGNOSIS — G47.9 SLEEP DISTURBANCE: ICD-10-CM

## 2024-10-23 DIAGNOSIS — R06.81 WITNESSED APNEIC SPELLS: ICD-10-CM

## 2024-10-23 PROCEDURE — 95810 POLYSOM 6/> YRS 4/> PARAM: CPT | Performed by: PSYCHIATRY & NEUROLOGY

## 2024-10-24 ENCOUNTER — PATIENT OUTREACH (OUTPATIENT)
Dept: CARE COORDINATION | Facility: CLINIC | Age: 54
End: 2024-10-24
Payer: COMMERCIAL

## 2024-10-30 LAB — SLPCOMP: NORMAL

## 2024-10-30 NOTE — PROCEDURES
" SLEEP STUDY INTERPRETATION  DIAGNOSTIC POLYSOMNOGRAPHY REPORT      Patient: AMANDA ARMSTRONG  YOB: 1970  Study Date: 10/23/2024  MRN: [id number]  Referring Provider: -  Ordering Provider: MARY JO Mitchell CNP, Thomas    Indications for Polysomnography: The patient is a 54 year old Female who is 5' 7\" and weighs 148.0 lbs. Her BMI is 23.2, [place] sleepiness scale 7 and neck circumference is 36CM cm. Relevant medical history includes night terrors, snoring, ? apneas. A diagnostic polysomnogram was performed to evaluate for sleep apnea/PLMS/RBD.    Polysomnogram Data: A full night polysomnogram recorded the standard physiologic parameters including EEG, EOG, EMG, ECG, nasal and oral airflow. Respiratory parameters of chest and abdominal movements were recorded with respiratory inductance plethysmography. Oxygen saturation was recorded by pulse oximetry. Hypopnea scoring rule used: 1B 4%.    Sleep Architecture: Sleep fragmentation  The total recording time of the polysomnogram was 483.6 minutes. The total sleep time was 442.0 minutes. Sleep latency was 24.5 minutes. REM latency was 134.5 minutes. Arousal index was 33.1 arousals per hour. Sleep efficiency was 91.4%. Wake after sleep onset was 17.0 minutes. The patient spent 10.5% of total sleep time in Stage N1, 47.7% in Stage N2, 19.2% in Stage N3, and 22.5% in REM. Time in REM supine was 29.0 minutes.    Respiration: The patient did not demonstrate clinically significant sleep disordered breathing.  Of note, this was a good study that included REM supine.    Events ? The polysomnogram revealed a presence of 4 obstructive, - central, and - mixed apneas resulting in an apnea index of 0.5 events per hour. There were 3 obstructive hypopneas and - central hypopneas resulting in an obstructive hypopnea index of 0.4 and central hypopnea index of - events per hour. The combined apnea/hypopnea index was 1.0 events per hour (central apnea/hypopnea index was - events " per hour). The REM AHI was 1.2 events per hour. The supine AHI was 2.5 events per hour. The RERA index was 2.6 events per hour.  The RDI was 3.5 events per hour.  Snoring - was reported as absent.  Respiratory rate and pattern - was notable for normal respiratory rate and pattern.  Sustained Sleep Associated Hypoventilation - Transcutaneous carbon dioxide monitoring was not used.  Sleep Associated Hypoxemia - (Greater than 5 minutes O2 sat at or below 88%) was not present. Baseline oxygen saturation was 95.8%. Lowest oxygen saturation was 92.0%. Time spent less than or equal to 88% was 0 minutes. Time spent less than or equal to 89% was 0 minutes.    Movement Activity: Two confusional arousals out of NREM sleep, one associated with upper airway restriction (not an apnea or hypopnea). Otherwise normal sleep related motor activity .  Periodic Limb Activity - There were 93 PLMs during the entire study. The PLM index was 12.6 movements per hour. The PLM Arousal Index was 4.8 per hour.  REM EMG Activity - Excessive muscle activity was not present.  Nocturnal Behavior - Abnormal sleep related behaviors were not noted.  Bruxism - None apparent.    Cardiac Summary: Sinus  The average pulse rate was 65.9 bpm. The minimum pulse rate was 57.0 bpm while the maximum pulse rate was 92.0 bpm.      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  Did not meet criteria for KARMEN, PLMD, or RBD.   One confusional arousal associated with an RERA.     Recommendations:  Clinical correlation required.  Recommend optimizing the duration and circadian timing of sleep  Could consider addressing upper airway restriction/snoring with oral appliance or CPAP however without an KARMEN diagnosis may be difficult to obtain coverage.   Could consider trying to address sleep fragmentation with clonazepam.   Advice regarding the risks of drowsy driving.  Suggest optimizing sleep schedule and avoiding sleep deprivation.    Diagnostic Codes:  G47.51, G47.9       Jone Turner MD 10-  Diplomate, Sleep Medicine  American Board of Psychiatry and Neurology

## 2024-11-12 ENCOUNTER — VIRTUAL VISIT (OUTPATIENT)
Dept: SLEEP MEDICINE | Facility: CLINIC | Age: 54
End: 2024-11-12
Payer: COMMERCIAL

## 2024-11-12 VITALS — BODY MASS INDEX: 22.76 KG/M2 | HEIGHT: 67 IN | WEIGHT: 145 LBS

## 2024-11-12 DIAGNOSIS — G47.51 CONFUSIONAL AROUSALS: Primary | ICD-10-CM

## 2024-11-12 DIAGNOSIS — F51.4 NIGHT TERRORS: ICD-10-CM

## 2024-11-12 PROCEDURE — G2211 COMPLEX E/M VISIT ADD ON: HCPCS | Mod: 95 | Performed by: NURSE PRACTITIONER

## 2024-11-12 PROCEDURE — 99215 OFFICE O/P EST HI 40 MIN: CPT | Mod: 95 | Performed by: NURSE PRACTITIONER

## 2024-11-12 ASSESSMENT — SLEEP AND FATIGUE QUESTIONNAIRES
HOW LIKELY ARE YOU TO NOD OFF OR FALL ASLEEP WHILE WATCHING TV: SLIGHT CHANCE OF DOZING
HOW LIKELY ARE YOU TO NOD OFF OR FALL ASLEEP WHILE SITTING AND READING: MODERATE CHANCE OF DOZING
HOW LIKELY ARE YOU TO NOD OFF OR FALL ASLEEP WHILE SITTING INACTIVE IN A PUBLIC PLACE: SLIGHT CHANCE OF DOZING
HOW LIKELY ARE YOU TO NOD OFF OR FALL ASLEEP WHILE LYING DOWN TO REST IN THE AFTERNOON WHEN CIRCUMSTANCES PERMIT: SLIGHT CHANCE OF DOZING
HOW LIKELY ARE YOU TO NOD OFF OR FALL ASLEEP WHILE SITTING AND TALKING TO SOMEONE: WOULD NEVER DOZE
HOW LIKELY ARE YOU TO NOD OFF OR FALL ASLEEP WHILE SITTING QUIETLY AFTER LUNCH WITHOUT ALCOHOL: SLIGHT CHANCE OF DOZING
HOW LIKELY ARE YOU TO NOD OFF OR FALL ASLEEP IN A CAR, WHILE STOPPED FOR A FEW MINUTES IN TRAFFIC: WOULD NEVER DOZE
HOW LIKELY ARE YOU TO NOD OFF OR FALL ASLEEP WHEN YOU ARE A PASSENGER IN A CAR FOR AN HOUR WITHOUT A BREAK: SLIGHT CHANCE OF DOZING

## 2024-11-12 ASSESSMENT — PAIN SCALES - GENERAL: PAINLEVEL_OUTOF10: NO PAIN (0)

## 2024-11-12 NOTE — PROGRESS NOTES
Video-Visit Details    Type of service:  Video Visit  Video Visit Start Time:11:29 AM  Video Visit End Time: 12:05 PM  Originating Location (pt. Location): Home      Distant Location (provider location):  Off-site   Platform used for Video Visit: [facility]        Sleep Study Follow-Up Visit:    Date on this visit: 11/12/2024    Wanda Orellana is a 54-year-old female with a PMH significant for seasonal allergies, headaches, mild intermittent asthma, GERD, and history of paroxysmal supraventricular tachycardia who presents today for follow-up of her sleep study done on 10/23/2024 at the [facility] for possible sleep apnea and PLMS/RBD .          These findings were reviewed with patient.     Past medical/surgical history, family history, social history, medications and allergies were reviewed.      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      Mild intermittent asthma      Priority: Medium     exercise induced      CARDIOVASCULAR SCREENING; LDL GOAL LESS THAN 160 02/29/2012     Priority: Medium    AMA (advanced maternal age) primigravida 35+ 02/02/2012     Priority: Medium     Normal 1st trimester screen.  H/o trisomy 21 previous SAB.  Level II fetal survey with left EIF, thickened cardiac wall, and right pyelectasis.  S/p genetic amnio 04/26/13.  Results pending.    Normal chromosomes, normal AFP.        Mucous polyp of cervix 02/02/2012     Priority: Medium    GERD (gastroesophageal reflux disease) 10/07/2011     Priority: Medium      Current Outpatient Medications   Medication Sig Dispense Refill    albuterol (PROAIR HFA/PROVENTIL HFA/VENTOLIN HFA) 108 (90 Base) MCG/ACT inhaler Inhale 2 puffs into the lungs every 6 hours as needed for  "shortness of breath / dyspnea or wheezing 18 g 11    budesonide-formoterol (SYMBICORT) 80-4.5 MCG/ACT Inhaler Inhale 2 puffs into the lungs 2 times daily (Patient not taking: Reported on 3/28/2024) 10.2 g 11    Cetirizine HCl (ZYRTEC ALLERGY PO) Take 1 tablet by mouth daily      metoprolol succinate ER (TOPROL XL) 25 MG 24 hr tablet Take 1 tablet (25 mg) by mouth daily 90 tablet 3    multivitamin, therapeutic with minerals (THERA-VIT-M) TABS Take 1 tablet by mouth daily      norethindrone-ethinyl estradiol (ALAYCEN 1/35) 1-35 MG-MCG tablet Take 1 tablet by mouth daily. 84 tablet 4    vitamin E 400 UNIT TABS Take 400 Units by mouth daily.       Current Facility-Administered Medications   Medication Dose Route Frequency Provider Last Rate Last Admin    rabies immune globulin 300 units/mL (HYPERAB) injection 1,260 Units  20 Units/kg Intramuscular Once Sven Pickens MD         Ht 1.702 m (5' 7\")   Wt 65.8 kg (145 lb)   BMI 22.71 kg/m      Impression/Plan:  1. Confusional arousals (Primary)  2. Night terrors  - Behavioral Sleep Medicine  Referral; Future    We reviewed the results of her sleep study in detail which showed 2 confusional arousals, 1 associated with a respiratory effort related arousal (RERA) and the patient did indicate that she had a night terror/nightmare during the sleep study.  She indicates that these confusional arousals and night terrors are occurring approximately 3 times per week and in 1 instance when she was sleeping in a hotel room she did sleepwalk and try to exit the room, however, she was unsuccessful.  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  As such, a more conservative recommendation for treatment would be with a sleep psychology " referral for the purpose of treating her nightmare disorder with imagery rehearsal therapy (IRT) and see if this is helpful.  She will contact me in the future should she like to pursue a trial of clonazepam.    She will follow up with me as needed or if symptoms worsen or fail to improve.    44 minutes spent with patient, all of which were spent face-to-face counseling, consulting, chart review/documentation, and coordinating plan of care on the date of the encounter.      MARY JO Grady CNP  Sleep Medicine      CC: Carol Bahena     This note was written with the assistance of the Dragon voice-dictation technology software. The final document, although reviewed, may contain errors. For corrections, please contact the office.

## 2024-11-12 NOTE — PATIENT INSTRUCTIONS
Behavioral Sleep Medicine Program    The [facility] Behavioral Sleep Medicine Program,  provides non-drug treatment for sleep problems as part of our multi-discipline sleep medicine program. Services offered include:    Cognitive-behavioral Therapies for Insomnia (CBT-I)  Management of Shift-work and Jet Lag Sleep Disorders  Management of Delayed, Advanced and Irregular Circadian Rhythm Sleep Disorders  Imagery Rehearsal Therapy (IRT) for Nightmare Disorder  Adaptation to PAP Therapy for Obstructive Sleep Apnea  Behavioral strategies to manage hypersomnia and fatigue    You have been referred for consultation with a sleep psychologist who specializes in behavioral sleep medicine and treatment of insomnia.  The [facility] Behavioral Sleep Medicine Program offers individualized telehealth services through our [facility] and online CBT-I.    Preparing for your Consultation    We recommmend you keep a Sleep Diary for at least a week prior to your visit. Complete the sleep diary each day first thing after you get up by answering a few key questions about your sleep using our convenient mobile awa or paper sleep diary.  Your answers should be based on your recall of the past 24 hours.  Avoid watching the clock or recording data during the night.     Insomnia  Awa    The Insomnia  mobile awa  is a convenient way to keep track of your sleep prior to your sleep consultation.  Simply download the free awa on your Apple or Android phone and record your information each morning.  The awa includes training, self-assessment, and sleep schedule recommendations.  Prior to your consultation we recommend you use only the sleep diary function. You can e-mail yourself a copy of your sleep diary data by going to the Settings section and using the [employer] User [employer] function.  During your consultation your provider will review the data with you.          [facility] Sleep Diary    You  can also track your sleep using the [facility] paper sleep diary.  You can upload your sleep diary and send it via a [employer] message, fax it to [phone], or have it with you at the time of your consultation.            CBT-I:  Frequently Asked Questions    What is CBT-I?    Cognitive Behavioral Therapy for Insomnia, also known as CBT-I, is a highly effective non-drug treatment for insomnia. The American College of Physicians recommends CBT-I as the first treatment for chronic insomnia.  Research has shown CBT-I to be safer and more effective long term than sleeping pills.    What does CBT-I involve?     CBT-I targets behaviors that lead to chronic insomnia:  Habits that weaken the bed as a cue for sleep  Habits that weaken your body's sleep drive and sleep/wake clock   Unhelpful sleep thoughts that increase sleep-related worry and arousal.    The process involves 3-6 telehealth visits that guide you to implement proven strategies to get a better night's sleep.    People often see improvement in their sleep within a few weeks. Research shows if you keep practicing the skills you learn your sleep is likely to continue to improve 6-12 months after treatment.    Does this program prescribe or manage sleep medication?    No.  Your prescribing provider is responsible to assist you in managing your sleep medications.  Some people choose to stop using sleep medication prior to or during CBT-I.  Our program can work with your prescribing provider to help reduce or eliminate use of sleep medications.     Getting Started Today!    If you haven't already done so, we recommend you consider making the following changes to your sleep habits prior to your sleep consultation:     Reduce your consumption of caffeine and alcohol.  Both can disrupt sleep and make strengthening your sleep more difficult.  Specifically:    - Avoid caffeine within 6 hours of bedtime   - No more than 3 caffeinated beverages per day (e.g. 8 oz.  "cup coffee or 12 oz. cup soda)            - No alcohol within 3 hours of bedtime    Make sure your bedroom is quiet, comfortable and dark.  Noise, light and an uncomfortable sleep space can harm your sleep.      Keep the same sleep schedule 7 days a week.unless you do shift work.      Our Online CBT-I Program    If you want to get started today, research indicates that online CBT-I can be effective for some individuals. These programs requires comfort with leigh-based or online learning.  However, digital CBT-I programs are not for everyone.  Contraindications include:    Seizure disorders,   Bipolar disorder,   Unstable medical or mental health conditions,   Frailty or risk of falling  Pregnancy    You should consult a sleep specialist before using these resources if you have:    Sleep Apnea  Restless Leg Syndrome  Sleep Walking  REM behavior disorder  Night Terrors  Excessive Daytime Sleepiness  Are engaged in shift work  Use prescription sleep medication    Click on the link below to get started today:                                  [email or web address]             Once you are registered you can share your sleep log data with [facility] where your health provider will be able to review your sleep log and progress.  Once you begin the program, go to the left side navigation bar and click on the  share sleep log  button:                                        This takes you to the next page where you enter the provider code [employer] and click Locate:                 This brings you to the verification page where you should see [facility] identified as your provider                                                                           By clicking \"Submit\" your sleep log data will be sent to our secure [facility] portal for review by you provider.     For Help Contact Customer Care At:    [email or web address]  If your sleep provider recommends online " CBT-I for you , the cost for an entire 6-week program is $40.    To get started, copy and paste the link below which will take you to the landing page to register:                              Self-help Workbooks for Insomnia    If you have found self-help books useful in the past, you may want to consider reading one of the following books prior to your consultation:    Say Clare to Insomnia: The Six-Week, Drug-Free Program Developed at [facility] [place].  Luke Moore MD. Available in paperback, Hardik, and audiobook.    Overcoming Insomnia: A Cognitive-Behavioral Therapy Approach, Workbook.  Jay Jay Almodovar, PhD  and Kalyani Hines, PhD.  Available in paperback and Hardik.    Quiet Your Mind and Get to Sleep: Solutions to Insomnia for Those with Depression, Anxiety, or Chronic Pain.  Marlene Souza, PhD and Kalyani Hines, PhD.  Available in paperback and Hardik

## 2024-11-12 NOTE — NURSING NOTE
Current patient location: [address] [place] [id number]    Is the patient currently in the state of [place]? YES    Visit mode:VIDEO    If the visit is dropped, the patient can be reconnected by:VIDEO VISIT: Text to cell phone:   Telephone Information:   Mobile [phone]       Will anyone else be joining the visit? NO  (If patient encounters technical issues they should call [phone]    Are changes needed to the allergy or medication list? Pt stated no med changes    Are refills needed on medications prescribed by this physician? NO    Rooming Documentation:  Questionnaire(s) completed    Reason for visit: RECHECK    Esme LANDERS

## 2024-11-13 ENCOUNTER — HOSPITAL ENCOUNTER (OUTPATIENT)
Dept: MAMMOGRAPHY | Facility: CLINIC | Age: 54
Discharge: HOME | End: 2024-11-13
Attending: NURSE PRACTITIONER | Admitting: NURSE PRACTITIONER
Payer: COMMERCIAL

## 2024-11-13 DIAGNOSIS — Z12.31 SCREENING MAMMOGRAM FOR BREAST CANCER: ICD-10-CM

## 2024-11-13 PROCEDURE — 77063 BREAST TOMOSYNTHESIS BI: CPT

## 2024-12-10 ENCOUNTER — APPOINTMENT (OUTPATIENT)
Dept: LAB | Facility: CLINIC | Age: 54
End: 2024-12-10
Payer: COMMERCIAL

## 2024-12-10 ENCOUNTER — VIRTUAL VISIT (OUTPATIENT)
Dept: FAMILY MEDICINE | Facility: CLINIC | Age: 54
End: 2024-12-10
Payer: COMMERCIAL

## 2024-12-10 DIAGNOSIS — J02.9 SORE THROAT: ICD-10-CM

## 2024-12-10 DIAGNOSIS — Z20.818 EXPOSURE TO STREP THROAT: ICD-10-CM

## 2024-12-10 DIAGNOSIS — R11.10 VOMITING AND DIARRHEA: Primary | ICD-10-CM

## 2024-12-10 DIAGNOSIS — R19.7 VOMITING AND DIARRHEA: Primary | ICD-10-CM

## 2024-12-10 LAB
DEPRECATED S PYO AG THROAT QL EIA: NEGATIVE
GROUP A STREP BY PCR: NOT DETECTED

## 2024-12-10 PROCEDURE — 99213 OFFICE O/P EST LOW 20 MIN: CPT | Mod: 95 | Performed by: PHYSICIAN ASSISTANT

## 2024-12-10 PROCEDURE — 87651 STREP A DNA AMP PROBE: CPT | Performed by: PHYSICIAN ASSISTANT

## 2024-12-10 ASSESSMENT — ENCOUNTER SYMPTOMS
CHILLS: 1
NAUSEA: 1
SHORTNESS OF BREATH: 0
FEVER: 0
VOMITING: 1
MYALGIAS: 1
DIARRHEA: 1
SORE THROAT: 1
COUGH: 0

## 2024-12-10 NOTE — PATIENT INSTRUCTIONS
For further evaluation of your symptoms we are completing strep throat testing.  If strep positive, I will send antibiotics to your pharmacy.  If strep negative, your symptoms are likely due to a viral upper respiratory infection or viral GI infection.  Viral infections will resolve on their own over approximately 10 days.  For discomfort you can use Tylenol/ibuprofen.  Make sure to get plenty of rest and stay hydrated.

## 2024-12-10 NOTE — PROGRESS NOTES
Wanda is a 54 year old who is being evaluated via a billable video visit.    How would you like to obtain your AVS? MyChart  If the video visit is dropped, the invitation should be resent by: Text to cell phone: [phone]  Will anyone else be joining your video visit? No      Assessment & Plan     Vomiting and diarrhea  Exposure to strep throat  Sore throat  Patient is a 54-year-old female who presents to virtual visit due to strep exposure with subsequent nausea, vomiting, mild sore throat, and ear pain.  Differential diagnosis includes strep throat, viral gastroenteritis, viral URI.  Will complete strep testing.  If strep negative, symptoms most likely due to gastroenteritis/URI and should improve on their own.  Improve on their own.  Recommended Tylenol/ibuprofen for discomfort.  If strep positive, will prescribe antibiotics.       - Streptococcus A Rapid Screen w/Reflex to PCR - Clinic Collect      See Patient Instructions    Subjective   Wanda is a 54 year old, presenting for the following health issues:  Pharyngitis        6/28/2024     9:31 AM   Additional Questions   Roomed by Mirella Cortes     History of Present Illness       Reason for visit:  Possible strep  Symptom onset:  1-3 days ago  Symptoms include:  Vomiiting, stomach cramps, Diarrhea  Symptom intensity:  Severe  Symptom progression:  Improving  What makes it better:  Nothing   She is taking medications regularly.     Patient's son had a sleep over and 2 kids were sick, one positive for strep. Patient and son both developed vomiting in the last few days.  Patient also had diarrhea.  She notes mild sore throat and ear pain.  No fever, but did have some sweats overnight.    Review of Systems   Constitutional:  Positive for chills. Negative for fever.   HENT:  Positive for congestion, ear pain and sore throat.    Respiratory:  Negative for cough and shortness of breath.    Cardiovascular:  Negative for chest pain.   Gastrointestinal:  Positive for  diarrhea, nausea and vomiting.   Musculoskeletal:  Positive for myalgias.           Objective           Vitals:  No vitals were obtained today due to virtual visit.    Physical Exam   GENERAL: alert and no distress  EYES: Eyes grossly normal to inspection.  No discharge or erythema, or obvious scleral/conjunctival abnormalities.  RESP: No audible wheeze, cough, or visible cyanosis.    SKIN: Visible skin clear. No significant rash, abnormal pigmentation or lesions.  NEURO: Cranial nerves grossly intact.  Mentation and speech appropriate for age.  PSYCH: Appropriate affect, tone, and pace of words          Video-Visit Details    Type of service:  Video Visit   Originating Location (pt. Location): Home    Distant Location (provider location):  On-site  Platform used for Video Visit: Thai  Signed Electronically by: Mulu King PA-C

## 2024-12-16 ENCOUNTER — MYC REFILL (OUTPATIENT)
Dept: FAMILY MEDICINE | Facility: CLINIC | Age: 54
End: 2024-12-16
Payer: COMMERCIAL

## 2024-12-16 DIAGNOSIS — Z30.41 ENCOUNTER FOR SURVEILLANCE OF CONTRACEPTIVE PILLS: ICD-10-CM

## 2024-12-16 RX ORDER — NORETHINDRONE AND ETHINYL ESTRADIOL 1 MG-35MCG
1 KIT ORAL DAILY
Qty: 84 TABLET | Refills: 4 | Status: SHIPPED | OUTPATIENT
Start: 2024-12-16

## 2024-12-20 ENCOUNTER — OFFICE VISIT (OUTPATIENT)
Dept: CARDIOLOGY | Facility: CLINIC | Age: 54
End: 2024-12-20
Attending: NURSE PRACTITIONER
Payer: COMMERCIAL

## 2024-12-20 VITALS
DIASTOLIC BLOOD PRESSURE: 89 MMHG | WEIGHT: 149 LBS | HEART RATE: 87 BPM | OXYGEN SATURATION: 100 % | BODY MASS INDEX: 23.95 KG/M2 | HEIGHT: 66 IN | SYSTOLIC BLOOD PRESSURE: 139 MMHG

## 2024-12-20 DIAGNOSIS — R00.2 PALPITATIONS: Primary | ICD-10-CM

## 2024-12-20 DIAGNOSIS — I47.10 PAROXYSMAL SUPRAVENTRICULAR TACHYCARDIA (H): ICD-10-CM

## 2024-12-20 NOTE — LETTER
12/20/2024    MARY JO Willams CNP  [address]  [facility] [address]    RE: Wanda Orellana       Dear Colleague,     I had the pleasure of seeing Wanda Orlelana in the [facility].      SERVICE DATE: December 20, 2024        DIAGNOSES/ASSESSMENT:    PSVT (Paroxysmal Supraventricular Tachycardia)  54-year-old female with 10-year history of palpitations, confirmed PSVT by [facility]. Episodes last 15-60 minutes with dizziness/lightheadedness. Smartwatch shows  bpm during episodes. Normal resting EKG, labs, echo, and event monitor.    PLAN:    Patient originally sought cardiology appointment with electrophysiologist to discuss the option of catheter ablation and was scheduled in general cardiology.  I have made a referral to electrophysiologist.  Recommended that she trial the metoprolol XL 25 mg daily.  Reassured patient that this is not a life-threatening arrhythmia, educated her about the Valsalva maneuver (which she already uses during palpitations) and answered questions.    Kwesi Dsouza MD  Cardiology      REFERRING PROVIDER:  MARY JO Kinsey CNP  [address] [facility] [address]  [place] [address]      REASON FOR VISIT:  Symptomatic PSVT.    HISTORY OF PRESENT ILLNESS:  Wanda Orellana is a pleasant 54 year old female, who was requesting to see an electrophysiologist to discuss catheter ablation of symptomatic paroxysmal supraventricular tachycardia and was instead scheduled with me and general cardiology.    She is previously seen Dr. Blake in 2022 and more recently Dr. Joel Israel January 2024 at [facility] for lifestyle-limiting palpitations.  Although her heart monitor was unremarkable she was asymptomatic at the time.  Echocardiogram showed structurally normal heart.  Resting EKG normal.  Labs including CBC, renal panel, thyroid function normal.  Subsequently, patient reports ascending EKG tracings from [employer] [facility] with Dr. Israel's office who diagnosed her with  "PSVT (heart rate up to 200 bpm) during symptomatic episodes and advised metoprolol XL 25 mg daily which patient has not started yet.     Palpitations are random and unpredictable, lasting 15-60 minutes. Associated with dizziness and occasional lightheadedness, not associated with chest pain or shortness of breath.  Not aborted Valsalva maneuver.  Palpitations have become more frequent, sometimes occurring while driving or shopping, causing patient to lie down.    Patient is postmenopausal and on estrogen replacement therapy. Denies tobacco use and energy drinks. Occasional alcohol use.    EXAM: BP: 139/89 mmHg, HR: 87 bpm. Weight: 149 lbs, BMI: 23. Heart regular, no murmurs. JVP normal. No lower extremity edema.    EKG (12/20/24): NSR at 80 bpm, normal intervals.    Labs (1/8/24): LDL 80, Cr 0.8, K 4.4, Na 139, TSH normal.    Echo (2/12/24): Normal study (LVF 65%, wall motion, diastolic function, RV, valves, atrial size).    Event Monitor (earlier this year): NSR, no arrhythmias.    This note was completed in part using dictation via the Dragon voice recognition software. Some word and grammatical errors may occur and must be interpreted in the appropriate clinical context. If there are any questions pertaining to this issue, please contact me for further clarification.     Orders Placed This Encounter   Procedures     EKG 12-lead complete w/read - Clinics (performed today)         Vitals: /89 (BP Location: Right arm, Patient Position: Sitting)   Pulse 87   Ht 1.679 m (5' 6.1\")   Wt 67.6 kg (149 lb)   SpO2 100%   BMI 23.98 kg/m        CURRENT MEDICATIONS:  Current Outpatient Medications   Medication Sig Dispense Refill     albuterol (PROAIR HFA/PROVENTIL HFA/VENTOLIN HFA) 108 (90 Base) MCG/ACT inhaler Inhale 2 puffs into the lungs every 6 hours as needed for shortness of breath / dyspnea or wheezing 18 g 11     budesonide-formoterol (SYMBICORT) 80-4.5 MCG/ACT Inhaler Inhale 2 puffs into the lungs 2 times " daily 10.2 g 11     Cetirizine HCl (ZYRTEC ALLERGY PO) Take 1 tablet by mouth daily       multivitamin, therapeutic with minerals (THERA-VIT-M) TABS Take 1 tablet by mouth daily       norethindrone-ethinyl estradiol (ALAYCEN 1/35) 1-35 MG-MCG tablet Take 1 tablet by mouth daily. 84 tablet 4     vitamin E 400 UNIT TABS Take 400 Units by mouth daily.       metoprolol succinate ER (TOPROL XL) 25 MG 24 hr tablet Take 1 tablet (25 mg) by mouth daily (Patient not taking: Reported on 12/20/2024) 90 tablet 3         ALLERGIES:  Allergies   Allergen Reactions     Asa [Aspirin]      Aspirin Buffered Other (See Comments)     Mouth and tongue swelling     Codeine              Thank you for allowing me to participate in the care of your patient.      Sincerely,     Kwesi Dsouza MD     [facility]  cc:   Carol Bahena, APRN CNP  [address]  [place] [address]

## 2024-12-20 NOTE — PROGRESS NOTES
SERVICE DATE: December 20, 2024        DIAGNOSES/ASSESSMENT:    PSVT (Paroxysmal Supraventricular Tachycardia)  54-year-old female with 10-year history of palpitations, confirmed PSVT by [employer]. Episodes last 15-60 minutes with dizziness/lightheadedness. Smartwatch shows  bpm during episodes. Normal resting EKG, labs, echo, and event monitor.    PLAN:    Patient originally sought cardiology appointment with electrophysiologist to discuss the option of catheter ablation and was scheduled in general cardiology.  I have made a referral to electrophysiologist.  Recommended that she trial the metoprolol XL 25 mg daily.  Reassured patient that this is not a life-threatening arrhythmia, educated her about the Valsalva maneuver (which she already uses during palpitations) and answered questions.    Kwesi Dsouza MD  Cardiology      REFERRING PROVIDER:  Carol Bahena, APRN CNP  [address] [facility] [address]  [place] [address]      REASON FOR VISIT:  Symptomatic PSVT.    HISTORY OF PRESENT ILLNESS:  Wanda Orellana is a pleasant 54 year old female, who was requesting to see an electrophysiologist to discuss catheter ablation of symptomatic paroxysmal supraventricular tachycardia and was instead scheduled with me and general cardiology.    She is previously seen Dr. Blake in 2022 and more recently Dr. Joel Israel January 2024 at [facility] for lifestyle-limiting palpitations.  Although her heart monitor was unremarkable she was asymptomatic at the time.  Echocardiogram showed structurally normal heart.  Resting EKG normal.  Labs including CBC, renal panel, thyroid function normal.  Subsequently, patient reports ascending EKG tracings from [facility] with Dr. Israel's office who diagnosed her with PSVT (heart rate up to 200 bpm) during symptomatic episodes and advised metoprolol XL 25 mg daily which patient has not started yet.     Palpitations are random and unpredictable, lasting 15-60 minutes. Associated with  "dizziness and occasional lightheadedness, not associated with chest pain or shortness of breath.  Not aborted Valsalva maneuver.  Palpitations have become more frequent, sometimes occurring while driving or shopping, causing patient to lie down.    Patient is postmenopausal and on estrogen replacement therapy. Denies tobacco use and energy drinks. Occasional alcohol use.    EXAM: BP: 139/89 mmHg, HR: 87 bpm. Weight: 149 lbs, BMI: 23. Heart regular, no murmurs. JVP normal. No lower extremity edema.    EKG (12/20/24): NSR at 80 bpm, normal intervals.    Labs (1/8/24): LDL 80, Cr 0.8, K 4.4, Na 139, TSH normal.    Echo (2/12/24): Normal study (LVF 65%, wall motion, diastolic function, RV, valves, atrial size).    Event Monitor (earlier this year): NSR, no arrhythmias.    This note was completed in part using dictation via the Dragon voice recognition software. Some word and grammatical errors may occur and must be interpreted in the appropriate clinical context. If there are any questions pertaining to this issue, please contact me for further clarification.     Orders Placed This Encounter   Procedures    EKG 12-lead complete w/read - Clinics (performed today)         Vitals: /89 (BP Location: Right arm, Patient Position: Sitting)   Pulse 87   Ht 1.679 m (5' 6.1\")   Wt 67.6 kg (149 lb)   SpO2 100%   BMI 23.98 kg/m        CURRENT MEDICATIONS:  Current Outpatient Medications   Medication Sig Dispense Refill    albuterol (PROAIR HFA/PROVENTIL HFA/VENTOLIN HFA) 108 (90 Base) MCG/ACT inhaler Inhale 2 puffs into the lungs every 6 hours as needed for shortness of breath / dyspnea or wheezing 18 g 11    budesonide-formoterol (SYMBICORT) 80-4.5 MCG/ACT Inhaler Inhale 2 puffs into the lungs 2 times daily 10.2 g 11    Cetirizine HCl (ZYRTEC ALLERGY PO) Take 1 tablet by mouth daily      multivitamin, therapeutic with minerals (THERA-VIT-M) TABS Take 1 tablet by mouth daily      norethindrone-ethinyl estradiol (ALAYCEN " 1/35) 1-35 MG-MCG tablet Take 1 tablet by mouth daily. 84 tablet 4    vitamin E 400 UNIT TABS Take 400 Units by mouth daily.      metoprolol succinate ER (TOPROL XL) 25 MG 24 hr tablet Take 1 tablet (25 mg) by mouth daily (Patient not taking: Reported on 12/20/2024) 90 tablet 3         ALLERGIES:  Allergies   Allergen Reactions    Asa [Aspirin]     Aspirin Buffered Other (See Comments)     Mouth and tongue swelling    Codeine

## 2025-01-02 ENCOUNTER — TELEPHONE (OUTPATIENT)
Dept: CARDIOLOGY | Facility: CLINIC | Age: 55
End: 2025-01-02
Payer: COMMERCIAL

## 2025-01-02 NOTE — TELEPHONE ENCOUNTER
RN supervisor called patient and reviewed the concerns she had with her being scheduled with general cardiology vs EP cardiology which she had expressed to the  she needed. RN supervisor listened to patient's concerns and expressed apologies for the confusion and error on our end. Patient expressed to this RN supervisor she does not want to be charged for the visit on 12/20/24 as it was scheduled incorrectly on our end. RN supervisor advised patient that RN supervisor would escalate this and follow up with her regarding outcome. Patient verbalized understanding and expressed appreciation for the callback and addressing her concern.

## 2025-01-06 ENCOUNTER — OFFICE VISIT (OUTPATIENT)
Dept: FAMILY MEDICINE | Facility: CLINIC | Age: 55
End: 2025-01-06
Payer: COMMERCIAL

## 2025-01-06 ENCOUNTER — TELEPHONE (OUTPATIENT)
Dept: FAMILY MEDICINE | Facility: CLINIC | Age: 55
End: 2025-01-06

## 2025-01-06 VITALS
BODY MASS INDEX: 23.07 KG/M2 | RESPIRATION RATE: 20 BRPM | WEIGHT: 147 LBS | HEART RATE: 110 BPM | HEIGHT: 67 IN | TEMPERATURE: 97.8 F | SYSTOLIC BLOOD PRESSURE: 104 MMHG | OXYGEN SATURATION: 100 % | DIASTOLIC BLOOD PRESSURE: 72 MMHG

## 2025-01-06 DIAGNOSIS — J45.21 MILD INTERMITTENT ASTHMA WITH ACUTE EXACERBATION: Primary | ICD-10-CM

## 2025-01-06 DIAGNOSIS — J10.1 INFLUENZA A: ICD-10-CM

## 2025-01-06 LAB
FLUAV AG SPEC QL IA: POSITIVE
FLUBV AG SPEC QL IA: NEGATIVE

## 2025-01-06 PROCEDURE — 87635 SARS-COV-2 COVID-19 AMP PRB: CPT | Performed by: FAMILY MEDICINE

## 2025-01-06 PROCEDURE — 99214 OFFICE O/P EST MOD 30 MIN: CPT | Performed by: FAMILY MEDICINE

## 2025-01-06 PROCEDURE — 87804 INFLUENZA ASSAY W/OPTIC: CPT | Performed by: FAMILY MEDICINE

## 2025-01-06 RX ORDER — ALBUTEROL SULFATE 90 UG/1
2 INHALANT RESPIRATORY (INHALATION) EVERY 6 HOURS PRN
Qty: 18 G | Refills: 1 | Status: SHIPPED | OUTPATIENT
Start: 2025-01-06

## 2025-01-06 RX ORDER — OSELTAMIVIR PHOSPHATE 75 MG/1
75 CAPSULE ORAL 2 TIMES DAILY
Qty: 10 CAPSULE | Refills: 0 | Status: SHIPPED | OUTPATIENT
Start: 2025-01-06 | End: 2025-01-11

## 2025-01-06 RX ORDER — BUDESONIDE AND FORMOTEROL FUMARATE DIHYDRATE 80; 4.5 UG/1; UG/1
AEROSOL RESPIRATORY (INHALATION)
Qty: 10.2 G | Refills: 1 | Status: SHIPPED | OUTPATIENT
Start: 2025-01-06

## 2025-01-06 ASSESSMENT — ASTHMA QUESTIONNAIRES
QUESTION_1 LAST FOUR WEEKS HOW MUCH OF THE TIME DID YOUR ASTHMA KEEP YOU FROM GETTING AS MUCH DONE AT WORK, SCHOOL OR AT HOME: ALL OF THE TIME
QUESTION_4 LAST FOUR WEEKS HOW OFTEN HAVE YOU USED YOUR RESCUE INHALER OR NEBULIZER MEDICATION (SUCH AS ALBUTEROL): ONCE A WEEK OR LESS
QUESTION_5 LAST FOUR WEEKS HOW WOULD YOU RATE YOUR ASTHMA CONTROL: COMPLETELY CONTROLLED
ACT_TOTALSCORE: 18
QUESTION_3 LAST FOUR WEEKS HOW OFTEN DID YOUR ASTHMA SYMPTOMS (WHEEZING, COUGHING, SHORTNESS OF BREATH, CHEST TIGHTNESS OR PAIN) WAKE YOU UP AT NIGHT OR EARLIER THAN USUAL IN THE MORNING: NOT AT ALL
ACT_TOTALSCORE: 18
QUESTION_2 LAST FOUR WEEKS HOW OFTEN HAVE YOU HAD SHORTNESS OF BREATH: THREE TO SIX TIMES A WEEK

## 2025-01-06 ASSESSMENT — ENCOUNTER SYMPTOMS
SORE THROAT: 1
COUGH: 1

## 2025-01-06 NOTE — TELEPHONE ENCOUNTER
RN called and relayed message below from Dr. Cartagena. Patient verbalized understanding and in agreement with plan.     Daljit MCCANN RN  [facility]       Brittanie Cartagena MD  [facility]  She has influenza A  I sent in RX for tamiflu to use bid for 5 days  They should contact her son's doctor to see if he should be treated  They should contact her mom's doctor to see if she should be treated.

## 2025-01-06 NOTE — PROGRESS NOTES
Assessment & Plan     Mild intermittent asthma with acute exacerbation  Not bad symptoms right now but acutely sick with flu.  Will send in new rx for albuterol and symbicort to use as needed and aren't .    - budesonide-formoterol (SYMBICORT) 80-4.5 MCG/ACT Inhaler  Dispense: 10.2 g; Refill: 1  - albuterol (PROAIR HFA/PROVENTIL HFA/VENTOLIN HFA) 108 (90 Base) MCG/ACT inhaler  Dispense: 18 g; Refill: 1    Influenza A  Treat with tamiflu.  Should reach out to her son's and mom's doctors for treatment as well.    - Influenza A & B Antigen - Clinic Collect  - COVID-19 Virus (Coronavirus) by PCR Nose  - oseltamivir (TAMIFLU) 75 MG capsule  Dispense: 10 capsule; Refill: 0            Subjective   Wanda is a 54 year old, presenting for the following health issues:  Pharyngitis, Cough, and ear sore         2025     1:49 PM   Additional Questions   Roomed by M   Accompanied by self     History of Present Illness       Reason for visit:  Double ear aches sore throat productive cough body aches larengytes  Symptom onset:  More than a month   She is taking medications regularly.    Ears are very uncomfortable.  Mild over the holidays and tried some ear drops and not helpful.  Ears are really achy now. This weekend got a productive cough, body aches, laryngitis from postnasal drainage.  Bad sore throat.   Was sealing a desk with stain and noted some fumes and that seemed to start everything.  Body aches.  H/o asthma- used albuterol on the day the desk fumes were bad in the house and was very tight in the chest.  No real asthma symptoms.  Doesn't have symbicort at home.  Feeling hot and cold but not actually checking temp. Perimenopausal.  Tylenol for body aches.  Did not do home covid test.  Son and mom also sick.      Mom lives with them and is on dialysis     Last flu and Covid shot 2023.   Tdap    H/o intermittent asthma- albuterol inhaler  Has rx for symbicort to use bid with 11 refills 2023 as rescue  "medication   On OCPs       6/27/2023     8:02 AM 6/28/2024     9:18 AM 1/6/2025     1:44 PM   ACT Total Scores   ACT TOTAL SCORE (Goal Greater than or Equal to 20) 21 23 18    In the past 12 months, how many times did you visit the emergency room for your asthma without being admitted to the hospital? 0  0 0   In the past 12 months, how many times were you hospitalized overnight because of your asthma? 0  0 0       Patient-reported    Proxy-reported     Virtual visit 12/10/24 for sore throat after strep exposure.  Strep negative     Physical 6/2024 reviewed with [facility]AP.         Objective    /72 (BP Location: Left arm, Patient Position: Sitting, Cuff Size: Adult Regular)   Pulse 110   Temp 97.8  F (36.6  C) (Temporal)   Resp 20   Ht 1.702 m (5' 7\")   Wt 66.7 kg (147 lb)   SpO2 100%   BMI 23.02 kg/m    Body mass index is 23.02 kg/m .  Physical Exam   Complete 10 point ROS completed today as part of the exam and patient denies any symptoms as reviewed in HPI     Wt Readings from Last 3 Encounters:   01/06/25 66.7 kg (147 lb)   12/20/24 67.6 kg (149 lb)   11/12/24 65.8 kg (145 lb)       No LMP recorded. (Menstrual status: Birth Control).    All normal as below except abnormalities include: pt appears tired.  Both TM with effusion but no infection noted.  Tachy heart but regular.  Feels warm to touch.  Occ loose cough.    General is a  54 year old sitting comfortably in no apparent distress   HEENT:  Eye exams within normal   Neck: Supple without lymphadenopathy or thyromegally  CV: Regular rhythm S1S2 without rubs, murmurs or gallops,   Lungs: Clear to auscultation bilaterally  Abd:  +BS, soft NT/ND,  No masses or organomegally,   Extremities: Warm, No Edema, 2+ Pedal and radial pulses bilaterally  Skin: No lesions or rashes noted  Neuro: Able to ambulate around the exam room with equal movement, strength and normal coordination of the upper and lower extremeties symmetrically    History summarized " from1-2:cardiology 12/2024  Virtual visit 12/2024 for strep exposure.    Physical 6/2024   Old Records-1: Outside allergies, meds, problems and immunizations were reconciled as needed from [facility]  Lab tests reviewed-1: 2024        Brittanie Cartagena MD             Signed Electronically by: Brittanie Cartagena MD      Prior to immunization administration, verified patients identity using patient s name and date of birth. Please see Immunization Activity for additional information.     Screening Questionnaire for Adult Immunization    Are you sick today?   Yes   Do you have allergies to medications, food, a vaccine component or latex?   Yes   Have you ever had a serious reaction after receiving a vaccination?   No   Do you have a long-term health problem with heart, lung, kidney, or metabolic disease (e.g., diabetes), asthma, a blood disorder, no spleen, complement component deficiency, a cochlear implant, or a spinal fluid leak?  Are you on long-term aspirin therapy?   Yes   Do you have cancer, leukemia, HIV/AIDS, or any other immune system problem?   No   Do you have a parent, brother, or sister with an immune system problem?   No   In the past 3 months, have you taken medications that affect  your immune system, such as prednisone, other steroids, or anticancer drugs; drugs for the treatment of rheumatoid arthritis, Crohn s disease, or psoriasis; or have you had radiation treatments?   No   Have you had a seizure, or a brain or other nervous system problem?   No   During the past year, have you received a transfusion of blood or blood    products, or been given immune (gamma) globulin or antiviral drug?   No   For women: Are you pregnant or is there a chance you could become       pregnant during the next month?   No   Have you received any vaccinations in the past 4 weeks?   No     Immunization questionnaire was positive for at least one answer.  Notified .      Patient instructed to remain in clinic for 15  minutes afterwards, and to report any adverse reactions.     Screening performed by LAMONT Rios MA on 1/6/2025 at 1:51 PM.

## 2025-01-07 LAB — SARS-COV-2 RNA RESP QL NAA+PROBE: NEGATIVE

## 2025-01-09 ENCOUNTER — HOSPITAL ENCOUNTER (EMERGENCY)
Facility: CLINIC | Age: 55
Discharge: HOME | End: 2025-01-09
Attending: EMERGENCY MEDICINE
Payer: COMMERCIAL

## 2025-01-09 VITALS
BODY MASS INDEX: 22.76 KG/M2 | HEIGHT: 67 IN | OXYGEN SATURATION: 98 % | WEIGHT: 145 LBS | RESPIRATION RATE: 23 BRPM | DIASTOLIC BLOOD PRESSURE: 84 MMHG | SYSTOLIC BLOOD PRESSURE: 128 MMHG | HEART RATE: 97 BPM

## 2025-01-09 DIAGNOSIS — I47.10 SVT (SUPRAVENTRICULAR TACHYCARDIA): ICD-10-CM

## 2025-01-09 LAB
ANION GAP SERPL CALCULATED.3IONS-SCNC: 15 MMOL/L (ref 7–15)
ATRIAL RATE - MUSE: 92 BPM
ATRIAL RATE - MUSE: NORMAL BPM
BASOPHILS # BLD AUTO: 0 10E3/UL (ref 0–0.2)
BASOPHILS NFR BLD AUTO: 0 %
BUN SERPL-MCNC: 9 MG/DL (ref 6–20)
CALCIUM SERPL-MCNC: 9.4 MG/DL (ref 8.8–10.4)
CHLORIDE SERPL-SCNC: 99 MMOL/L (ref 98–107)
CREAT SERPL-MCNC: 0.86 MG/DL (ref 0.51–0.95)
DIASTOLIC BLOOD PRESSURE - MUSE: NORMAL MMHG
DIASTOLIC BLOOD PRESSURE - MUSE: NORMAL MMHG
EGFRCR SERPLBLD CKD-EPI 2021: 80 ML/MIN/1.73M2
EOSINOPHIL # BLD AUTO: 0 10E3/UL (ref 0–0.7)
EOSINOPHIL NFR BLD AUTO: 0 %
ERYTHROCYTE [DISTWIDTH] IN BLOOD BY AUTOMATED COUNT: 12.1 % (ref 10–15)
GLUCOSE SERPL-MCNC: 124 MG/DL (ref 70–99)
HCO3 SERPL-SCNC: 20 MMOL/L (ref 22–29)
HCT VFR BLD AUTO: 40.5 % (ref 35–47)
HGB BLD-MCNC: 14.1 G/DL (ref 11.7–15.7)
IMM GRANULOCYTES # BLD: 0 10E3/UL
IMM GRANULOCYTES NFR BLD: 0 %
INTERPRETATION ECG - MUSE: NORMAL
INTERPRETATION ECG - MUSE: NORMAL
LYMPHOCYTES # BLD AUTO: 2.2 10E3/UL (ref 0.8–5.3)
LYMPHOCYTES NFR BLD AUTO: 26 %
MCH RBC QN AUTO: 30.2 PG (ref 26.5–33)
MCHC RBC AUTO-ENTMCNC: 34.8 G/DL (ref 31.5–36.5)
MCV RBC AUTO: 87 FL (ref 78–100)
MONOCYTES # BLD AUTO: 0.6 10E3/UL (ref 0–1.3)
MONOCYTES NFR BLD AUTO: 7 %
NEUTROPHILS # BLD AUTO: 5.5 10E3/UL (ref 1.6–8.3)
NEUTROPHILS NFR BLD AUTO: 66 %
NRBC # BLD AUTO: 0 10E3/UL
NRBC BLD AUTO-RTO: 0 /100
P AXIS - MUSE: 65 DEGREES
P AXIS - MUSE: NORMAL DEGREES
PLATELET # BLD AUTO: 236 10E3/UL (ref 150–450)
POTASSIUM SERPL-SCNC: 3.9 MMOL/L (ref 3.4–5.3)
PR INTERVAL - MUSE: 150 MS
PR INTERVAL - MUSE: NORMAL MS
QRS DURATION - MUSE: 68 MS
QRS DURATION - MUSE: 70 MS
QT - MUSE: 248 MS
QT - MUSE: 354 MS
QTC - MUSE: 437 MS
QTC - MUSE: 438 MS
R AXIS - MUSE: 61 DEGREES
R AXIS - MUSE: 95 DEGREES
RBC # BLD AUTO: 4.67 10E6/UL (ref 3.8–5.2)
SODIUM SERPL-SCNC: 134 MMOL/L (ref 135–145)
SYSTOLIC BLOOD PRESSURE - MUSE: NORMAL MMHG
SYSTOLIC BLOOD PRESSURE - MUSE: NORMAL MMHG
T AXIS - MUSE: -42 DEGREES
T AXIS - MUSE: 55 DEGREES
TSH SERPL DL<=0.005 MIU/L-ACNC: 2.15 UIU/ML (ref 0.3–4.2)
VENTRICULAR RATE- MUSE: 188 BPM
VENTRICULAR RATE- MUSE: 92 BPM
WBC # BLD AUTO: 8.3 10E3/UL (ref 4–11)

## 2025-01-09 PROCEDURE — 85004 AUTOMATED DIFF WBC COUNT: CPT | Performed by: EMERGENCY MEDICINE

## 2025-01-09 PROCEDURE — 84443 ASSAY THYROID STIM HORMONE: CPT | Performed by: EMERGENCY MEDICINE

## 2025-01-09 PROCEDURE — 36415 COLL VENOUS BLD VENIPUNCTURE: CPT | Performed by: EMERGENCY MEDICINE

## 2025-01-09 PROCEDURE — 80048 BASIC METABOLIC PNL TOTAL CA: CPT | Performed by: EMERGENCY MEDICINE

## 2025-01-09 PROCEDURE — 99291 CRITICAL CARE FIRST HOUR: CPT

## 2025-01-09 PROCEDURE — 93005 ELECTROCARDIOGRAM TRACING: CPT

## 2025-01-09 PROCEDURE — 85018 HEMOGLOBIN: CPT | Performed by: EMERGENCY MEDICINE

## 2025-01-09 ASSESSMENT — ACTIVITIES OF DAILY LIVING (ADL): ADLS_ACUITY_SCORE: 41

## 2025-01-09 NOTE — ED PROVIDER NOTES
"  Emergency Department Note      History of Present Illness     Chief Complaint   Palpitations      HPI   Wanda Orellana is a 54 year old female with history of paroxysmal SVT who presents to the ED with palpitations. The patient reports that while upstairs in the hospital she developed a sudden episode of chest tightness and palpitations. She arrives to the ED with an EKG reading of SVT, which she has had on one other occasion. She denies trying Valsalva maneuver to resolve her symptoms. The patient notes being prescribed Tamiflu currently due to having flu symptoms for the last 6 days, but she is improving and has not filled the prescription. She reports spending time around her mother in the hospital ICU who is sick with influenza. The patient notes that she is scheduled with electrophysiology to discuss a potential ablation in the next few weeks and was recently seen by cardiology and initiated on metoprolol which she has not started taking.  She reports occasional alcohol consumption.. She drinks a cup of coffee a day but not this week due to her illness. The patient takes birth control but otherwise no other prescribed medications.     Independent Historian   None    Review of External Notes   N/a    Past Medical History     Medical History and Problem List   GERD  Mucous polyp of cervix  Asthma  Paroxysmal SVT  Congenital uterine anomaly  Chronic rhinitis    Medications   Proair inhaler  Symbicort inhaler  Tamiflu  Alaycen    Surgical History   Colonoscopy  D&C suction  Eye surgery  [place] teeth removal    Physical Exam     Patient Vitals for the past 24 hrs:   BP Pulse Resp SpO2 Height Weight   01/09/25 1400 121/69 96 25 99 % -- --   01/09/25 1329 127/85 95 17 100 % -- --   01/09/25 1324 142/83 98 18 100 % -- --   01/09/25 1319 (!) 166/129 172 22 100 % -- --   01/09/25 1313 (!) 190/97 190 22 100 % 1.702 m (5' 7\") 65.8 kg (145 lb)     Physical Exam  General: Alert and cooperative with exam. Patient in mild " distress. Normal mentation.  Head:  Scalp is NC/AT  Eyes:  No scleral icterus, PERRL  ENT:  The external nose and ears are normal. The oropharynx is normal and without erythema; mucus membranes are moist. Uvula midline, no evidence of deep space infection.  Neck:  Normal range of motion without rigidity.  CV:  Tachycardic rate and regular rhythm    No pathologic murmur   Resp:  Breath sounds are clear bilaterally    Non-labored, no retractions or accessory muscle use  GI:  Abdomen is soft, no distension, no tenderness. No peritoneal signs  MS:  No lower extremity edema   Skin:  Warm and dry, No rash or lesions noted.  Neuro:  Oriented x 3. No gross motor deficits.    Diagnostics     Lab Results   Labs Ordered and Resulted from Time of ED Arrival to Time of ED Departure   BASIC METABOLIC PANEL - Abnormal       Result Value    Sodium 134 (*)     Potassium 3.9      Chloride 99      Carbon Dioxide (CO2) 20 (*)     Anion Gap 15      Urea Nitrogen 9.0      Creatinine 0.86      GFR Estimate 80      Calcium 9.4      Glucose 124 (*)    TSH WITH FREE T4 REFLEX - Normal    TSH 2.15     CBC WITH PLATELETS AND DIFFERENTIAL    WBC Count 8.3      RBC Count 4.67      Hemoglobin 14.1      Hematocrit 40.5      MCV 87      MCH 30.2      MCHC 34.8      RDW 12.1      Platelet Count 236      % Neutrophils 66      % Lymphocytes 26      % Monocytes 7      % Eosinophils 0      % Basophils 0      % Immature Granulocytes 0      NRBCs per 100 WBC 0      Absolute Neutrophils 5.5      Absolute Lymphocytes 2.2      Absolute Monocytes 0.6      Absolute Eosinophils 0.0      Absolute Basophils 0.0      Absolute Immature Granulocytes 0.0      Absolute NRBCs 0.0         Imaging   No orders to display       EKG   ECG results from 01/09/25   EKG 12 lead     Value    Systolic Blood Pressure     Diastolic Blood Pressure     Ventricular Rate 188    Atrial Rate     LA Interval     QRS Duration 70        QTc 438    P Axis     R AXIS 95    T Axis -42     Interpretation ECG      ** Critical Test Result: High HR  Supraventricular tachycardia  Rightward axis  Marked ST abnormality, possible inferior subendocardial injury  Abnormal ECG  Read by me at 1335       ECG results from 01/09/25   EKG 12 lead     Value    Systolic Blood Pressure     Diastolic Blood Pressure     Ventricular Rate 92    Atrial Rate 92    NY Interval 150    QRS Duration 68        QTc 437    P Axis 65    R AXIS 61    T Axis 55    Interpretation ECG      Sinus rhythm  Normal ECG  Read by me at 1351           Independent Interpretation   None    ED Course      Medications Administered   Medications - No data to display      Discussion of Management   None    ED Course   ED Course as of 01/09/25 1427   Thu Jan 09, 2025   1318 I obtained history and examined the patient as noted above   1427 I rechecked the patient and explained findings.       Additional Documentation  None    Medical Decision Making / Diagnosis     CMS Diagnoses: None    MIPS       None    MDM   Wanda Orellana is a 54 year old female Wanda Orellana is a 54 year old female who presents with tachycardia and palpitations. Broad differential diagnosis considered including SVT, sinus tachycardia, re-entrant tachycardia for the narrow complex regular tachycardia that she presents with by ECG.  Signs and symptoms here are consistent with SVT. She successfully converted after Valsalva maneuver. Post-conversion ECG looks excellent.  No other symptoms and no indication at this point to check d-dimer, trop, stress echo, etc. Labs without significant findings as above. T  Doubt ACS or PE and would not do any further workup for these etiologies.  Patient has follow-up arranged with her electrophysiologist as outpatient; recommend she keep this appointment.  Was also recently prescribed metoprolol by cardiologist, recommended that she begin taking this medication.  Additionally recommended close follow-up with her primary care doctor to  discuss discontinuation of oral contraceptive pills.  Additional supportive care and return precautions were discussed.  Asymptomatic at discharge.      Disposition   The patient was discharged.     Diagnosis     ICD-10-CM    1. SVT (supraventricular tachycardia)  I47.10                Scribe Disclosure:  IMartin, am serving as a scribe at 1:22 PM on 1/9/2025 to document services personally performed by Tawanda Sterling DO based on my observations and the provider's statements to me.        Tawanda Sterling DO  01/09/25 1442

## 2025-05-29 ENCOUNTER — PATIENT OUTREACH (OUTPATIENT)
Dept: CARE COORDINATION | Facility: CLINIC | Age: 55
End: 2025-05-29
Payer: COMMERCIAL

## 2025-06-12 ENCOUNTER — PATIENT OUTREACH (OUTPATIENT)
Dept: CARE COORDINATION | Facility: CLINIC | Age: 55
End: 2025-06-12
Payer: COMMERCIAL

## 2025-08-12 ENCOUNTER — OFFICE VISIT (OUTPATIENT)
Dept: FAMILY MEDICINE | Facility: CLINIC | Age: 55
End: 2025-08-12
Payer: COMMERCIAL

## 2025-08-12 VITALS
TEMPERATURE: 99.4 F | RESPIRATION RATE: 16 BRPM | HEART RATE: 72 BPM | HEIGHT: 66 IN | WEIGHT: 130.5 LBS | OXYGEN SATURATION: 99 % | SYSTOLIC BLOOD PRESSURE: 138 MMHG | BODY MASS INDEX: 20.97 KG/M2 | DIASTOLIC BLOOD PRESSURE: 85 MMHG

## 2025-08-12 DIAGNOSIS — N95.2 POST-MENOPAUSE ATROPHIC VAGINITIS: ICD-10-CM

## 2025-08-12 DIAGNOSIS — N95.1 PERIMENOPAUSAL: ICD-10-CM

## 2025-08-12 DIAGNOSIS — T63.441A BEE STING REACTION, ACCIDENTAL OR UNINTENTIONAL, INITIAL ENCOUNTER: Primary | ICD-10-CM

## 2025-08-12 DIAGNOSIS — Z86.79 H/O PAROXYSMAL SUPRAVENTRICULAR TACHYCARDIA: ICD-10-CM

## 2025-08-12 PROCEDURE — G2211 COMPLEX E/M VISIT ADD ON: HCPCS | Performed by: NURSE PRACTITIONER

## 2025-08-12 PROCEDURE — 1125F AMNT PAIN NOTED PAIN PRSNT: CPT | Performed by: NURSE PRACTITIONER

## 2025-08-12 PROCEDURE — 99215 OFFICE O/P EST HI 40 MIN: CPT | Performed by: NURSE PRACTITIONER

## 2025-08-12 PROCEDURE — 3079F DIAST BP 80-89 MM HG: CPT | Performed by: NURSE PRACTITIONER

## 2025-08-12 PROCEDURE — 3075F SYST BP GE 130 - 139MM HG: CPT | Performed by: NURSE PRACTITIONER

## 2025-08-12 ASSESSMENT — ASTHMA QUESTIONNAIRES
ACT_TOTALSCORE: 23
QUESTION_1 LAST FOUR WEEKS HOW MUCH OF THE TIME DID YOUR ASTHMA KEEP YOU FROM GETTING AS MUCH DONE AT WORK, SCHOOL OR AT HOME: NONE OF THE TIME
QUESTION_2 LAST FOUR WEEKS HOW OFTEN HAVE YOU HAD SHORTNESS OF BREATH: ONCE OR TWICE A WEEK
QUESTION_5 LAST FOUR WEEKS HOW WOULD YOU RATE YOUR ASTHMA CONTROL: COMPLETELY CONTROLLED
QUESTION_4 LAST FOUR WEEKS HOW OFTEN HAVE YOU USED YOUR RESCUE INHALER OR NEBULIZER MEDICATION (SUCH AS ALBUTEROL): ONCE A WEEK OR LESS
QUESTION_3 LAST FOUR WEEKS HOW OFTEN DID YOUR ASTHMA SYMPTOMS (WHEEZING, COUGHING, SHORTNESS OF BREATH, CHEST TIGHTNESS OR PAIN) WAKE YOU UP AT NIGHT OR EARLIER THAN USUAL IN THE MORNING: NOT AT ALL

## 2025-08-12 ASSESSMENT — PAIN SCALES - GENERAL: PAINLEVEL_OUTOF10: MILD PAIN (3)

## 2025-08-25 ENCOUNTER — OFFICE VISIT (OUTPATIENT)
Dept: FAMILY MEDICINE | Facility: CLINIC | Age: 55
End: 2025-08-25
Payer: COMMERCIAL

## 2025-08-25 VITALS
TEMPERATURE: 97.1 F | OXYGEN SATURATION: 98 % | DIASTOLIC BLOOD PRESSURE: 72 MMHG | RESPIRATION RATE: 18 BRPM | SYSTOLIC BLOOD PRESSURE: 112 MMHG | HEART RATE: 72 BPM

## 2025-08-25 DIAGNOSIS — N95.2 POST-MENOPAUSE ATROPHIC VAGINITIS: ICD-10-CM

## 2025-08-25 DIAGNOSIS — Z00.00 ROUTINE GENERAL MEDICAL EXAMINATION AT A HEALTH CARE FACILITY: Primary | ICD-10-CM

## 2025-08-25 DIAGNOSIS — I47.10 PAROXYSMAL SUPRAVENTRICULAR TACHYCARDIA: ICD-10-CM

## 2025-08-25 DIAGNOSIS — F43.0 ACUTE REACTION TO STRESS: ICD-10-CM

## 2025-08-25 DIAGNOSIS — Z86.79 H/O PAROXYSMAL SUPRAVENTRICULAR TACHYCARDIA: ICD-10-CM

## 2025-08-25 PROCEDURE — 1126F AMNT PAIN NOTED NONE PRSNT: CPT | Performed by: NURSE PRACTITIONER

## 2025-08-25 PROCEDURE — 3078F DIAST BP <80 MM HG: CPT | Performed by: NURSE PRACTITIONER

## 2025-08-25 PROCEDURE — 99213 OFFICE O/P EST LOW 20 MIN: CPT | Mod: 25 | Performed by: NURSE PRACTITIONER

## 2025-08-25 PROCEDURE — 99396 PREV VISIT EST AGE 40-64: CPT | Performed by: NURSE PRACTITIONER

## 2025-08-25 PROCEDURE — G2211 COMPLEX E/M VISIT ADD ON: HCPCS | Performed by: NURSE PRACTITIONER

## 2025-08-25 PROCEDURE — 3074F SYST BP LT 130 MM HG: CPT | Performed by: NURSE PRACTITIONER

## 2025-08-25 RX ORDER — DILTIAZEM HYDROCHLORIDE 30 MG/1
30 TABLET, FILM COATED ORAL 4 TIMES DAILY PRN
Qty: 30 TABLET | Refills: 1 | Status: SHIPPED | OUTPATIENT
Start: 2025-08-25

## 2025-08-25 RX ORDER — ESTRADIOL 0.1 MG/G
2 CREAM VAGINAL
Qty: 42.5 G | Refills: 11 | Status: SHIPPED | OUTPATIENT
Start: 2025-08-25

## 2025-08-25 SDOH — HEALTH STABILITY: PHYSICAL HEALTH: ON AVERAGE, HOW MANY DAYS PER WEEK DO YOU ENGAGE IN MODERATE TO STRENUOUS EXERCISE (LIKE A BRISK WALK)?: 2 DAYS

## 2025-08-25 ASSESSMENT — PAIN SCALES - GENERAL: PAINLEVEL_OUTOF10: NO PAIN (0)

## (undated) DEVICE — SOL WATER IRRIG 500ML BOTTLE 2F7113

## (undated) DEVICE — SNARE CAPIVATOR ROUND COLD SNR BX10 M00561101

## (undated) DEVICE — SPECIMEN CONTAINER 3OZ W/FORMALIN 59901

## (undated) DEVICE — KIT ENDO FIRST STEP DISINFECTANT 200ML W/POUCH EP-4

## (undated) DEVICE — SUCTION MANIFOLD NEPTUNE 2 SYS 1 PORT 702-025-000

## (undated) DEVICE — GOWN IMPERVIOUS 2XL BLUE

## (undated) DEVICE — TUBING SUCTION 12"X1/4" N612

## (undated) DEVICE — KIT ENDO TURNOVER/PROCEDURE CARRY-ON 101822